# Patient Record
Sex: FEMALE | Race: WHITE | NOT HISPANIC OR LATINO
[De-identification: names, ages, dates, MRNs, and addresses within clinical notes are randomized per-mention and may not be internally consistent; named-entity substitution may affect disease eponyms.]

---

## 2018-04-25 ENCOUNTER — TRANSCRIPTION ENCOUNTER (OUTPATIENT)
Age: 33
End: 2018-04-25

## 2018-04-26 ENCOUNTER — APPOINTMENT (OUTPATIENT)
Dept: HEMATOLOGY ONCOLOGY | Facility: CLINIC | Age: 33
End: 2018-04-26
Payer: COMMERCIAL

## 2018-04-26 VITALS
TEMPERATURE: 99.3 F | RESPIRATION RATE: 16 BRPM | DIASTOLIC BLOOD PRESSURE: 58 MMHG | HEART RATE: 80 BPM | SYSTOLIC BLOOD PRESSURE: 108 MMHG | OXYGEN SATURATION: 99 % | HEIGHT: 66.73 IN | BODY MASS INDEX: 19.69 KG/M2 | WEIGHT: 124 LBS

## 2018-04-26 DIAGNOSIS — Z83.2 FAMILY HISTORY OF DISEASES OF THE BLOOD AND BLOOD-FORMING ORGANS AND CERTAIN DISORDERS INVOLVING THE IMMUNE MECHANISM: ICD-10-CM

## 2018-04-26 PROCEDURE — 99205 OFFICE O/P NEW HI 60 MIN: CPT

## 2018-06-13 ENCOUNTER — TRANSCRIPTION ENCOUNTER (OUTPATIENT)
Age: 33
End: 2018-06-13

## 2018-06-22 ENCOUNTER — APPOINTMENT (OUTPATIENT)
Dept: HEMATOLOGY ONCOLOGY | Facility: CLINIC | Age: 33
End: 2018-06-22
Payer: COMMERCIAL

## 2018-06-22 VITALS
WEIGHT: 125 LBS | HEART RATE: 83 BPM | DIASTOLIC BLOOD PRESSURE: 72 MMHG | RESPIRATION RATE: 20 BRPM | OXYGEN SATURATION: 100 % | SYSTOLIC BLOOD PRESSURE: 111 MMHG | BODY MASS INDEX: 19.85 KG/M2 | HEIGHT: 66.73 IN | TEMPERATURE: 98.3 F

## 2018-06-22 PROCEDURE — 99214 OFFICE O/P EST MOD 30 MIN: CPT

## 2018-06-30 ENCOUNTER — TRANSCRIPTION ENCOUNTER (OUTPATIENT)
Age: 33
End: 2018-06-30

## 2018-07-16 ENCOUNTER — APPOINTMENT (OUTPATIENT)
Dept: HEMATOLOGY ONCOLOGY | Facility: CLINIC | Age: 33
End: 2018-07-16
Payer: COMMERCIAL

## 2018-07-16 VITALS
DIASTOLIC BLOOD PRESSURE: 59 MMHG | OXYGEN SATURATION: 100 % | BODY MASS INDEX: 19.69 KG/M2 | HEART RATE: 82 BPM | RESPIRATION RATE: 16 BRPM | HEIGHT: 66.73 IN | WEIGHT: 124 LBS | SYSTOLIC BLOOD PRESSURE: 103 MMHG | TEMPERATURE: 99.4 F

## 2018-07-16 PROCEDURE — 99214 OFFICE O/P EST MOD 30 MIN: CPT

## 2018-07-30 ENCOUNTER — TRANSCRIPTION ENCOUNTER (OUTPATIENT)
Age: 33
End: 2018-07-30

## 2018-08-13 ENCOUNTER — APPOINTMENT (OUTPATIENT)
Dept: HEMATOLOGY ONCOLOGY | Facility: CLINIC | Age: 33
End: 2018-08-13
Payer: COMMERCIAL

## 2018-08-13 VITALS
BODY MASS INDEX: 19.85 KG/M2 | OXYGEN SATURATION: 97 % | HEIGHT: 66.73 IN | HEART RATE: 101 BPM | DIASTOLIC BLOOD PRESSURE: 66 MMHG | RESPIRATION RATE: 20 BRPM | WEIGHT: 125 LBS | TEMPERATURE: 99 F | SYSTOLIC BLOOD PRESSURE: 101 MMHG

## 2018-08-13 PROCEDURE — 99214 OFFICE O/P EST MOD 30 MIN: CPT

## 2018-09-07 ENCOUNTER — APPOINTMENT (OUTPATIENT)
Dept: HEMATOLOGY ONCOLOGY | Facility: CLINIC | Age: 33
End: 2018-09-07
Payer: COMMERCIAL

## 2018-09-07 VITALS
TEMPERATURE: 98.2 F | BODY MASS INDEX: 20.01 KG/M2 | RESPIRATION RATE: 20 BRPM | HEART RATE: 72 BPM | SYSTOLIC BLOOD PRESSURE: 114 MMHG | OXYGEN SATURATION: 100 % | WEIGHT: 125.99 LBS | HEIGHT: 66.73 IN | DIASTOLIC BLOOD PRESSURE: 76 MMHG

## 2018-09-07 PROCEDURE — 99214 OFFICE O/P EST MOD 30 MIN: CPT

## 2018-10-18 ENCOUNTER — TRANSCRIPTION ENCOUNTER (OUTPATIENT)
Age: 33
End: 2018-10-18

## 2018-11-02 ENCOUNTER — TRANSCRIPTION ENCOUNTER (OUTPATIENT)
Age: 33
End: 2018-11-02

## 2018-11-02 NOTE — ASSESSMENT
[FreeTextEntry1] : IBD/ Premenopausal state without heavy bleeding\par Fatigue, tiredness, exhaustion\par \par Suspect her fatigue is multifactorial \par Component of iron deficiency - better with IV iron\par However fatigue continues to affect her routine- ? related to inflammation related to IBD vs CTD vs fibromyalgia\par AIDEN speckled with 1:80\par Has appointment with Dr Rivera (rheumatology)\par \par Dehydration\par IV NS today and once a month\par \par Follow up in 8 weeks. CBC, CMP, ESR, CRP, ferritin\par \par

## 2018-11-02 NOTE — CONSULT LETTER
[Dear  ___] : Dear  [unfilled], [Consult Letter:] : I had the pleasure of evaluating your patient, [unfilled]. [Please see my note below.] : Please see my note below. [Consult Closing:] : Thank you very much for allowing me to participate in the care of this patient.  If you have any questions, please do not hesitate to contact me. [Sincerely,] : Sincerely, [FreeTextEntry3] : Sridhar Meneses MD, MPH\par Attending Physician\par Hematology Oncology\par [facility] [place]\par [facility] [place]\par  [DrAdams  ___] : Dr. YOUNG

## 2018-11-02 NOTE — PHYSICAL EXAM
[Fully active, able to carry on all pre-disease performance without restriction] : Status 0 - Fully active, able to carry on all pre-disease performance without restriction [Thin] : thin [Normal] : normoactive bowel sounds, soft and nontender, no hepatosplenomegaly or masses appreciated

## 2018-11-02 NOTE — HISTORY OF PRESENT ILLNESS
[de-identified] : Ms Cuevas is a very pleasant 32 year old patient with IBD here with anemia\par \par IBD/UC vs Crohns since 2011- gets flair ups - liaza 4 tab/day. Canaza supp\par Was recently on prednisone - stopped due to rash - 2-3 weeks\par \par LMP 4/5/18 - Are not heavy\par \par Every 2 months had small amount of blood in stool\par \par Feels tired and exhausted\par  [de-identified] : She is seen today for follow up. \par \par Overall remains stable with similar complaints\par Fatigue, occasional diarrhea causing dehydration. Feels better with hydration

## 2018-11-09 ENCOUNTER — APPOINTMENT (OUTPATIENT)
Dept: HEMATOLOGY ONCOLOGY | Facility: CLINIC | Age: 33
End: 2018-11-09

## 2018-11-11 ENCOUNTER — LABORATORY RESULT (OUTPATIENT)
Age: 33
End: 2018-11-11

## 2018-11-22 ENCOUNTER — TRANSCRIPTION ENCOUNTER (OUTPATIENT)
Age: 33
End: 2018-11-22

## 2018-11-26 ENCOUNTER — CHART COPY (OUTPATIENT)
Age: 33
End: 2018-11-26

## 2018-11-28 ENCOUNTER — RECORD ABSTRACTING (OUTPATIENT)
Age: 33
End: 2018-11-28

## 2018-11-28 DIAGNOSIS — G25.81 RESTLESS LEGS SYNDROME: ICD-10-CM

## 2018-11-28 DIAGNOSIS — F06.1 MAJOR DEPRESSIVE DISORDER, RECURRENT, MODERATE: ICD-10-CM

## 2018-11-28 DIAGNOSIS — R15.2 FECAL URGENCY: ICD-10-CM

## 2018-11-28 DIAGNOSIS — Z80.0 FAMILY HISTORY OF MALIGNANT NEOPLASM OF DIGESTIVE ORGANS: ICD-10-CM

## 2018-11-28 DIAGNOSIS — F33.1 MAJOR DEPRESSIVE DISORDER, RECURRENT, MODERATE: ICD-10-CM

## 2018-12-05 ENCOUNTER — APPOINTMENT (OUTPATIENT)
Dept: GASTROENTEROLOGY | Facility: CLINIC | Age: 33
End: 2018-12-05
Payer: OTHER MISCELLANEOUS

## 2018-12-05 VITALS
DIASTOLIC BLOOD PRESSURE: 72 MMHG | HEIGHT: 66 IN | SYSTOLIC BLOOD PRESSURE: 130 MMHG | BODY MASS INDEX: 20.09 KG/M2 | WEIGHT: 125 LBS | HEART RATE: 97 BPM

## 2018-12-05 VITALS — TEMPERATURE: 99.1 F

## 2018-12-05 PROCEDURE — 99215 OFFICE O/P EST HI 40 MIN: CPT

## 2018-12-05 RX ORDER — BUDESONIDE 9 MG/1
9 TABLET, EXTENDED RELEASE ORAL
Qty: 30 | Refills: 0 | Status: DISCONTINUED | COMMUNITY
Start: 2018-02-13 | End: 2018-12-05

## 2018-12-05 RX ORDER — MELOXICAM 7.5 MG/1
7.5 TABLET ORAL
Qty: 30 | Refills: 0 | Status: DISCONTINUED | COMMUNITY
Start: 2018-02-15 | End: 2018-12-05

## 2018-12-05 RX ORDER — BALSALAZIDE DISODIUM 750 MG/1
750 CAPSULE ORAL
Qty: 270 | Refills: 0 | Status: DISCONTINUED | COMMUNITY
Start: 2017-08-10 | End: 2018-12-05

## 2018-12-05 RX ORDER — HYOSCYAMINE SULFATE 0.12 MG/1
0.12 TABLET SUBLINGUAL
Qty: 30 | Refills: 0 | Status: DISCONTINUED | COMMUNITY
Start: 2018-04-12 | End: 2018-12-05

## 2018-12-05 RX ORDER — BALSALAZIDE DISODIUM 750 MG/1
750 CAPSULE ORAL 3 TIMES DAILY
Refills: 0 | Status: DISCONTINUED | COMMUNITY
End: 2018-12-05

## 2018-12-05 RX ORDER — HYDROCORTISONE 100 MG/60ML
100 ENEMA RECTAL
Refills: 0 | Status: DISCONTINUED | COMMUNITY
End: 2018-12-05

## 2018-12-05 RX ORDER — AMOXICILLIN AND CLAVULANATE POTASSIUM 875; 125 MG/1; 1/1
875-125 TABLET, FILM COATED ORAL
Refills: 0 | Status: DISCONTINUED | COMMUNITY
End: 2018-12-05

## 2018-12-05 RX ORDER — PANTOPRAZOLE 40 MG/1
40 TABLET, DELAYED RELEASE ORAL
Qty: 30 | Refills: 0 | Status: DISCONTINUED | COMMUNITY
Start: 2018-02-25 | End: 2018-12-05

## 2018-12-05 RX ORDER — TRIAMCINOLONE ACETONIDE 1 MG/G
0.1 PASTE DENTAL
Qty: 5 | Refills: 0 | Status: DISCONTINUED | COMMUNITY
Start: 2018-01-13 | End: 2018-12-05

## 2018-12-05 RX ORDER — LIDOCAINE HCL 2 %
2 SOLUTION, ORAL MUCOUS MEMBRANE
Refills: 0 | Status: DISCONTINUED | COMMUNITY
End: 2018-12-05

## 2018-12-05 RX ORDER — CLINDAMYCIN AND BENZOYL PEROXIDE 50; 10 MG/G; MG/G
1-5 GEL TOPICAL
Qty: 50 | Refills: 0 | Status: DISCONTINUED | COMMUNITY
Start: 2018-04-10 | End: 2018-12-05

## 2018-12-05 RX ORDER — PANTOPRAZOLE SODIUM 40 MG/1
40 TABLET, DELAYED RELEASE ORAL DAILY
Refills: 0 | Status: DISCONTINUED | COMMUNITY
End: 2018-12-05

## 2018-12-05 RX ORDER — HYDROCORTISONE 100 MG/60ML
100 ENEMA RECTAL
Qty: 1680 | Refills: 0 | Status: DISCONTINUED | COMMUNITY
Start: 2018-02-13 | End: 2018-12-05

## 2018-12-05 RX ORDER — MESALAMINE 1000 MG/1
1000 SUPPOSITORY RECTAL
Qty: 30 | Refills: 0 | Status: DISCONTINUED | COMMUNITY
Start: 2018-02-13 | End: 2018-12-05

## 2018-12-05 RX ORDER — CYCLOBENZAPRINE HYDROCHLORIDE 10 MG/1
10 TABLET, FILM COATED ORAL
Qty: 90 | Refills: 0 | Status: DISCONTINUED | COMMUNITY
Start: 2018-02-15 | End: 2018-12-05

## 2018-12-05 RX ORDER — TOBRAMYCIN 3 MG/ML
0.3 SOLUTION/ DROPS OPHTHALMIC
Qty: 5 | Refills: 0 | Status: DISCONTINUED | COMMUNITY
Start: 2018-06-13 | End: 2018-12-05

## 2018-12-05 RX ORDER — RIFAXIMIN 550 MG/1
550 TABLET ORAL
Qty: 42 | Refills: 0 | Status: DISCONTINUED | COMMUNITY
Start: 2018-03-13 | End: 2018-12-05

## 2018-12-05 RX ORDER — ALBUTEROL SULFATE 90 UG/1
108 AEROSOL, METERED RESPIRATORY (INHALATION)
Refills: 0 | Status: DISCONTINUED | COMMUNITY
End: 2018-12-05

## 2018-12-05 RX ORDER — FLUCONAZOLE 150 MG/1
150 TABLET ORAL DAILY
Refills: 0 | Status: DISCONTINUED | COMMUNITY
End: 2018-12-05

## 2018-12-05 RX ORDER — LIDOCAINE HYDROCHLORIDE 20 MG/ML
2 SOLUTION OROPHARYNGEAL
Qty: 450 | Refills: 0 | Status: DISCONTINUED | COMMUNITY
Start: 2017-12-30 | End: 2018-12-05

## 2018-12-05 RX ORDER — PREDNISONE 10 MG/1
10 TABLET ORAL
Qty: 120 | Refills: 0 | Status: DISCONTINUED | COMMUNITY
Start: 2018-03-05 | End: 2018-12-05

## 2018-12-05 RX ORDER — PREGABALIN 25 MG/1
25 CAPSULE ORAL
Qty: 60 | Refills: 0 | Status: DISCONTINUED | COMMUNITY
Start: 2017-11-30 | End: 2018-12-05

## 2018-12-06 ENCOUNTER — INBOUND DOCUMENT (OUTPATIENT)
Age: 33
End: 2018-12-06

## 2018-12-07 ENCOUNTER — APPOINTMENT (OUTPATIENT)
Dept: FAMILY MEDICINE | Facility: CLINIC | Age: 33
End: 2018-12-07
Payer: OTHER MISCELLANEOUS

## 2018-12-07 VITALS
HEART RATE: 96 BPM | OXYGEN SATURATION: 98 % | BODY MASS INDEX: 20.41 KG/M2 | TEMPERATURE: 99.4 F | SYSTOLIC BLOOD PRESSURE: 118 MMHG | DIASTOLIC BLOOD PRESSURE: 70 MMHG | RESPIRATION RATE: 18 BRPM | HEIGHT: 66 IN | WEIGHT: 127 LBS

## 2018-12-07 DIAGNOSIS — Z87.2 PERSONAL HISTORY OF DISEASES OF THE SKIN AND SUBCUTANEOUS TISSUE: ICD-10-CM

## 2018-12-07 DIAGNOSIS — Z86.19 PERSONAL HISTORY OF OTHER INFECTIOUS AND PARASITIC DISEASES: ICD-10-CM

## 2018-12-07 DIAGNOSIS — G43.909 MIGRAINE, UNSPECIFIED, NOT INTRACTABLE, W/OUT STATUS MIGRAINOSUS: ICD-10-CM

## 2018-12-07 LAB
C DIFF TOX GENS STL QL NAA+PROBE: NORMAL
CDIFF BY PCR: NOT DETECTED
CRP SERPL-MCNC: 0.28 MG/DL
ERYTHROCYTE [SEDIMENTATION RATE] IN BLOOD BY WESTERGREN METHOD: 15 MM/HR
M TB IFN-G BLD-IMP: NEGATIVE
QUANTIFERON TB PLUS MITOGEN MINUS NIL: 6.7 IU/ML
QUANTIFERON TB PLUS NIL: 0.02 IU/ML
QUANTIFERON TB PLUS TB1 MINUS NIL: 0 IU/ML
QUANTIFERON TB PLUS TB2 MINUS NIL: 0 IU/ML

## 2018-12-07 PROCEDURE — 99214 OFFICE O/P EST MOD 30 MIN: CPT

## 2018-12-08 PROBLEM — G43.909 MIGRAINES: Status: RESOLVED | Noted: 2018-12-05 | Resolved: 2018-12-08

## 2018-12-08 PROBLEM — Z87.2 H/O ACNE VULGARIS: Status: RESOLVED | Noted: 2018-11-28 | Resolved: 2018-12-08

## 2018-12-08 PROBLEM — Z86.19 HISTORY OF PSEUDOMEMBRANOUS ENTEROCOLITIS: Status: RESOLVED | Noted: 2018-11-28 | Resolved: 2018-12-08

## 2018-12-08 NOTE — REVIEW OF SYSTEMS
[Negative] : Heme/Lymph [Abdominal Pain] : abdominal pain [Diarrhea] : diarrhea [Fever] : no fever [Chills] : no chills [Fatigue] : no fatigue [Nausea] : no nausea [Vomiting] : no vomiting

## 2018-12-08 NOTE — HISTORY OF PRESENT ILLNESS
[FreeTextEntry1] : Workers' Compensation case followup. [de-identified] : Patient presents for followup of chronic diarrhea/abdominal colic, associated with documented pseudomembranous colitis from 10/31/18. Since last office visit, patient was hospitalized-[facility]-11/21-11/25/18-for dehydration. Treated with IV fluids and IV metronidazole. Upon discharge, was continued on oral vancomycin 250 mg qid to be tapered down to 125 mg/day over 4 weeks. During hospitalization, underwent emergent colonoscopy which revealed no evidence of pseudomembranous colitis, but with evidence of residual inflammatory bowel disease, most consistent with Crohn's disease at this time. Patient has been provided with an off work order through 01/05/19. 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 There have been now 2 negative clostridium difficile antigen tests on the patient's stool-one during hospitalization and one after hospitalization. Had had one negative test prior to the hospitalization. Patient states that she is feeling much better overall and tolerating all oral medications well. Having 2-3 semi-formed stools/day without blood, pus, or mucus.

## 2018-12-08 NOTE — PHYSICAL EXAM

## 2018-12-13 ENCOUNTER — APPOINTMENT (OUTPATIENT)
Dept: FAMILY MEDICINE | Facility: CLINIC | Age: 33
End: 2018-12-13

## 2018-12-14 ENCOUNTER — RX RENEWAL (OUTPATIENT)
Age: 33
End: 2018-12-14

## 2018-12-28 ENCOUNTER — APPOINTMENT (OUTPATIENT)
Dept: FAMILY MEDICINE | Facility: CLINIC | Age: 33
End: 2018-12-28
Payer: OTHER MISCELLANEOUS

## 2018-12-28 VITALS
HEIGHT: 66 IN | HEART RATE: 99 BPM | BODY MASS INDEX: 20.41 KG/M2 | TEMPERATURE: 98.7 F | DIASTOLIC BLOOD PRESSURE: 80 MMHG | SYSTOLIC BLOOD PRESSURE: 118 MMHG | OXYGEN SATURATION: 99 % | WEIGHT: 127 LBS | RESPIRATION RATE: 18 BRPM

## 2018-12-28 PROCEDURE — 99214 OFFICE O/P EST MOD 30 MIN: CPT

## 2018-12-30 ENCOUNTER — APPOINTMENT (OUTPATIENT)
Dept: FAMILY MEDICINE | Facility: CLINIC | Age: 33
End: 2018-12-30
Payer: COMMERCIAL

## 2018-12-30 VITALS
HEART RATE: 97 BPM | OXYGEN SATURATION: 99 % | BODY MASS INDEX: 20.41 KG/M2 | HEIGHT: 66 IN | RESPIRATION RATE: 18 BRPM | WEIGHT: 127 LBS

## 2018-12-30 LAB
C DIFF TOX GENS STL QL NAA+PROBE: NORMAL
CDIFF BY PCR: NOT DETECTED

## 2018-12-30 PROCEDURE — 99214 OFFICE O/P EST MOD 30 MIN: CPT

## 2018-12-30 RX ORDER — VANCOMYCIN HYDROCHLORIDE 125 MG/1
125 CAPSULE ORAL
Qty: 49 | Refills: 0 | Status: DISCONTINUED | COMMUNITY
Start: 2018-11-25

## 2018-12-30 RX ORDER — AMOXICILLIN 875 MG/1
875 TABLET, FILM COATED ORAL
Qty: 20 | Refills: 0 | Status: DISCONTINUED | COMMUNITY
Start: 2018-09-24

## 2018-12-30 RX ORDER — MECLIZINE HYDROCHLORIDE 25 MG/1
25 TABLET ORAL
Qty: 20 | Refills: 0 | Status: DISCONTINUED | COMMUNITY
Start: 2018-09-24

## 2018-12-30 RX ORDER — ESCITALOPRAM OXALATE 10 MG/1
10 TABLET ORAL
Refills: 0 | Status: DISCONTINUED | COMMUNITY
Start: 2018-01-15 | End: 2018-12-30

## 2018-12-30 NOTE — HISTORY OF PRESENT ILLNESS
[FreeTextEntry1] : Workers' Compensation case followup. [de-identified] : Patient presents for followup of chronic diarrhea/abdominal colic, associated with documented pseudomembranous colitis from 10/31/18. Since last office visit, patient was hospitalized-[facility]-11/21-11/25/18-for dehydration. Treated with IV fluids and IV metronidazole. Upon discharge, was continued on oral vancomycin 250 mg qid to be tapered down to 125 mg/day over 4 weeks. During hospitalization, underwent emergent colonoscopy which revealed no evidence of pseudomembranous colitis, but with evidence of residual inflammatory bowel disease, most consistent with Crohn's disease at this time. Patient had been provided with an off work order through 01/05/19. 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 There had been 2 negative clostridium difficile antigen tests on the patient's stool-one during hospitalization and one after hospitalization. Had had one negative test prior to the hospitalization. Patient now states a recurrence of loose foul-smelling stools over the past 4-5 days. Has brought in a sample to be retested for clostridium difficile. Denies any blood, pus, or mucus in stools. No fever or chills. Denies significant abdominal distress.

## 2018-12-30 NOTE — HISTORY OF PRESENT ILLNESS
[FreeTextEntry1] : 4 week followup of chronic medical conditions, and prescription refills. [de-identified] : Patient presents for followup of chronic medical conditions-nature depressive disorder, generalized anxiety disorder, migraine headaches, acne vulgaris-and prescription refills. Presently taking Lexapro 20 mg/day and tolerating. Symptoms are now well controlled. Using clonazepam 0.5 mg HS for sleep only. Migraine headaches are stable. Continues to have diarrhea despite negative clostridium difficile stool PCR.

## 2018-12-30 NOTE — PHYSICAL EXAM

## 2018-12-30 NOTE — REVIEW OF SYSTEMS
[Negative] : Heme/Lymph [Fatigue] : fatigue [Abdominal Pain] : abdominal pain [Diarrhea] : diarrhea [Nausea] : no nausea [Constipation] : no constipation [Vomiting] : no vomiting [Heartburn] : no heartburn [Melena] : no melena

## 2019-01-01 ENCOUNTER — TRANSCRIPTION ENCOUNTER (OUTPATIENT)
Age: 34
End: 2019-01-01

## 2019-01-01 LAB
C DIFF TOX GENS STL QL NAA+PROBE: NORMAL
CDIFF BY PCR: DETECTED

## 2019-01-07 ENCOUNTER — APPOINTMENT (OUTPATIENT)
Dept: GASTROENTEROLOGY | Facility: CLINIC | Age: 34
End: 2019-01-07
Payer: COMMERCIAL

## 2019-01-07 ENCOUNTER — RX RENEWAL (OUTPATIENT)
Age: 34
End: 2019-01-07

## 2019-01-07 VITALS
TEMPERATURE: 99.2 F | RESPIRATION RATE: 16 BRPM | OXYGEN SATURATION: 99 % | SYSTOLIC BLOOD PRESSURE: 110 MMHG | BODY MASS INDEX: 19.61 KG/M2 | WEIGHT: 122 LBS | DIASTOLIC BLOOD PRESSURE: 70 MMHG | HEIGHT: 66 IN | HEART RATE: 91 BPM

## 2019-01-07 PROCEDURE — 99245 OFF/OP CONSLTJ NEW/EST HI 55: CPT

## 2019-01-07 RX ORDER — PROMETHAZINE HYDROCHLORIDE AND CODEINE PHOSPHATE 6.25; 1 MG/5ML; MG/5ML
6.25-1 SOLUTION ORAL
Refills: 0 | Status: DISCONTINUED | COMMUNITY
End: 2019-01-07

## 2019-01-07 NOTE — ASSESSMENT
[FreeTextEntry1] : 33 Y F, originally diagnosed with UC 2011, question of Crohn's Disease now that granulomas seen on recent colon biopsy. No e/o small bowel disease. Has been treated for  C diff 5 times with vancomycin without success as PCR remains positive and she remains symptomatic despite no endoscopic disease activity.  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 \par - In addition, there is data to suggest that C diff in IBD patients would benefit from IBD therapy escalation (ie. poorly controlled colitis is a RF for C diff; Minna et al, 2018 ), but her Nov 2018 colonoscopy was normal appearing!\par \par Indeterminate IBD, UC presentation, but recently with granuloma's (+) on biopsy\par - continue mesalamine for now given normal looking colon recently.\par - Ddx includes Crohn's and Behcets (she has large tongue/cheek ulcers, biopsied)\par - Once recovered from C diff, we can request the slides for our own review (colon + lip); consider serology (asca can be + in [place] as well); need to ask her about genital apthae/eye/skin lesions; consider rheum referral\par - it would be ideal for her to get ahold of her very first colonoscopy report/images\par \par Incontinence\par - schedule ARM 1 month after FMT \par \par F/U post-FMT

## 2019-01-07 NOTE — PHYSICAL EXAM
[General Appearance - Alert] : alert [General Appearance - In No Acute Distress] : in no acute distress [Neck Appearance] : the appearance of the neck was normal [] : no respiratory distress [Bowel Sounds] : normal bowel sounds [Abdomen Soft] : soft [Abdomen Tenderness] : non-tender [Abnormal Walk] : normal gait [Skin Color & Pigmentation] : normal skin color and pigmentation [No Focal Deficits] : no focal deficits [Oriented To Time, Place, And Person] : oriented to person, place, and time [Sclera] : the sclera and conjunctiva were normal [Outer Ear] : the ears and nose were normal in appearance [Cervical Lymph Nodes Enlarged Posterior Bilaterally] : posterior cervical [No CVA Tenderness] : no ~M costovertebral angle tenderness

## 2019-01-07 NOTE — CONSULT LETTER
[Dear  ___] : Dear  [unfilled], [Consult Letter:] : I had the pleasure of evaluating your patient, [unfilled]. [Please see my note below.] : Please see my note below. [Consult Closing:] : Thank you very much for allowing me to participate in the care of this patient.  If you have any questions, please do not hesitate to contact me. [Sincerely,] : Sincerely, [FreeTextEntry3] : Gagan Bansal MD\par Director, IBD Program\par [facility], [facility]\par \par Associate Professor of Medicine\par [employer]\par [employer] [facility]\par  [DrAdams  ___] : Dr. YOUNG

## 2019-01-07 NOTE — ASSESSMENT
[FreeTextEntry1] : 33 Y F, originally diagnosed with UC 2011, question of Crohn's Disease now that granulomas seen on recent colon biopsy. No e/o small bowel disease. Has been treated for  C diff 5 times with vancomycin without success as PCR remains positive and she remains symptomatic despite no endoscopic disease activity.  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 \par - In addition, there is data to suggest that C diff in IBD patients would benefit from IBD therapy escalation (ie. poorly controlled colitis is a RF for C diff; Minna et al, 2018 ), but her Nov 2018 colonoscopy was normal appearing!\par \par Indeterminate IBD, UC presentation, but recently with granuloma's (+) on biopsy\par - continue mesalamine for now given normal looking colon recently.\par - Ddx includes Crohn's and Behcets (she has large tongue/cheek ulcers, biopsied)\par - Once recovered from C diff, we can request the slides for our own review (colon + lip); consider serology ([facility]a can be + in [place] as well); need to ask her about genital apthae/eye/skin lesions; consider rheum referral\par - it would be ideal for her to get ahold of her very first colonoscopy report/images\par \par Incontinence\par - schedule ARM 1 month after FMT \par \par F/U post-FMT  13

## 2019-01-07 NOTE — CONSULT LETTER
[Dear  ___] : Dear  [unfilled], [Consult Letter:] : I had the pleasure of evaluating your patient, [unfilled]. [Please see my note below.] : Please see my note below. [Consult Closing:] : Thank you very much for allowing me to participate in the care of this patient.  If you have any questions, please do not hesitate to contact me. [Sincerely,] : Sincerely, [FreeTextEntry3] : Gagan Bansal MD\par Director, IBD Program\par [facility], [facility]\par \par Associate Professor of Medicine\par [employer]\par [employer] [facility]\par  [DrAdams  ___] : Dr. YOUNG

## 2019-01-07 NOTE — HISTORY OF PRESENT ILLNESS
[de-identified] : 33 Y F, originally diagnosed with UC in 2011, here referred for fecal transplant for refractory C diff and confirming IBD dx. Diagnoses recently changed to Crohn's Disease due to granuloma seen on biopsy from Nov 2018 cscope. 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 \par \par HPI: In 2011, she was having blood/mucous in stool, w/ diarrhea, incontinence and abdominal pain/cramping. Started on prednisone and solumedrol at diagnosis - thinks she was in remission for a few months. Started on colazol, switched to Lialda. Had another flare about 6 months later, restarted prednisone. Has been off and on prednisone - Dr. Pereira apparently was dismissing her symptoms and told her she had a "virus" and in 2018 she went to see Dr. Huitron in November 2018 when she was admitted to hospital for dehydration. Has been on prednisone since diagnosis about 4-5 times. \par \par Biopsies at diagnosis were c/w IBD - from rectum to cecum but no endoscopy report available. \par \par First Cdiff exposure was June 2017 - treated with vancomycin. \par November 2018 2nd diagnosis - treated twice - with vancomycin + flagyl, and vanco taper again \par December 2018 3rd diagnosis -  on vancomycin since (250mg Q6H) \par \par Continues to have fecal incontinence - feels the urge but unable to make it to the bathroom. Usually diarrhea associated incontinence, not formed stool. Reports nocturnal symptoms on occasion, few times per week. AM frequency ~ 5x/day, very loose. Sees blood on toilet paper, not in toilet. Abdominal pain daily LLQ before BMs. BM relieves pain. + nausea/vomiting - takes Zofran PRN every few days. + weight loss ~ 5lbs in 1 month, no appetite. \par \par Patient denies any trauma to the rectum. No anal fistula or fissure. \par \par EIMs: at diagnosis, had PG on legs, no eye complaints, + apthous ulcers that doesn't track disease, no joint pains, but muscle cramps at night \par \par Past treatment included: Lialda, prednisone, colazol, Canasa, Rowasa, Rifaximin, Uceris, and treated with injectafer for HECTOR and NS IVfluids every month - not for the last few months however. \par \par Fam hx: no IBD, maternal grandmother with CRC age 74\par Social hx: no tobacco use, no illicit drug use, NSAID use 5x/month, no alcohol use. \par \par Using tylenol + codeine for LLQ pain started in November. \par \par Been out of work since October 31.

## 2019-01-07 NOTE — HISTORY OF PRESENT ILLNESS
[de-identified] : 33 Y F, originally diagnosed with UC in 2011, here referred for fecal transplant for refractory C diff and confirming IBD dx. Diagnoses recently changed to Crohn's Disease due to granuloma seen on biopsy from Nov 2018 cscope. 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 \par \par HPI: In 2011, she was having blood/mucous in stool, w/ diarrhea, incontinence and abdominal pain/cramping. Started on prednisone and solumedrol at diagnosis - thinks she was in remission for a few months. Started on colazol, switched to Lialda. Had another flare about 6 months later, restarted prednisone. Has been off and on prednisone - Dr. Pereira apparently was dismissing her symptoms and told her she had a "virus" and in 2018 she went to see Dr. Huitron in November 2018 when she was admitted to hospital for dehydration. Has been on prednisone since diagnosis about 4-5 times. \par \par Biopsies at diagnosis were c/w IBD - from rectum to cecum but no endoscopy report available. \par \par First Cdiff exposure was June 2017 - treated with vancomycin. \par November 2018 2nd diagnosis - treated twice - with vancomycin + flagyl, and vanco taper again \par December 2018 3rd diagnosis -  on vancomycin since (250mg Q6H) \par \par Continues to have fecal incontinence - feels the urge but unable to make it to the bathroom. Usually diarrhea associated incontinence, not formed stool. Reports nocturnal symptoms on occasion, few times per week. AM frequency ~ 5x/day, very loose. Sees blood on toilet paper, not in toilet. Abdominal pain daily LLQ before BMs. BM relieves pain. + nausea/vomiting - takes Zofran PRN every few days. + weight loss ~ 5lbs in 1 month, no appetite. \par \par Patient denies any trauma to the rectum. No anal fistula or fissure. \par \par EIMs: at diagnosis, had PG on legs, no eye complaints, + apthous ulcers that doesn't track disease, no joint pains, but muscle cramps at night \par \par Past treatment included: Lialda, prednisone, colazol, Canasa, Rowasa, Rifaximin, Uceris, and treated with injectafer for HECTOR and NS IVfluids every month - not for the last few months however. \par \par Fam hx: no IBD, maternal grandmother with CRC age 74\par Social hx: no tobacco use, no illicit drug use, NSAID use 5x/month, no alcohol use. \par \par Using tylenol + codeine for LLQ pain started in November. \par \par Been out of work since October 31.

## 2019-01-10 ENCOUNTER — APPOINTMENT (OUTPATIENT)
Dept: RHEUMATOLOGY | Facility: CLINIC | Age: 34
End: 2019-01-10

## 2019-01-13 ENCOUNTER — APPOINTMENT (OUTPATIENT)
Dept: FAMILY MEDICINE | Facility: CLINIC | Age: 34
End: 2019-01-13
Payer: COMMERCIAL

## 2019-01-13 VITALS
DIASTOLIC BLOOD PRESSURE: 72 MMHG | BODY MASS INDEX: 20.73 KG/M2 | RESPIRATION RATE: 18 BRPM | HEART RATE: 76 BPM | WEIGHT: 129 LBS | OXYGEN SATURATION: 98 % | HEIGHT: 66 IN | TEMPERATURE: 98 F | SYSTOLIC BLOOD PRESSURE: 116 MMHG

## 2019-01-13 PROCEDURE — 99215 OFFICE O/P EST HI 40 MIN: CPT

## 2019-01-13 NOTE — PHYSICAL EXAM
[No Acute Distress] : no acute distress [Well Nourished] : well nourished [Well Developed] : well developed [Normal Sclera/Conjunctiva] : normal sclera/conjunctiva [PERRL] : pupils equal round and reactive to light [EOMI] : extraocular movements intact [Normal Outer Ear/Nose] : the outer ears and nose were normal in appearance [Normal Oropharynx] : the oropharynx was normal [No JVD] : no jugular venous distention [Supple] : supple [No Lymphadenopathy] : no lymphadenopathy [Thyroid Normal, No Nodules] : the thyroid was normal and there were no nodules present [No Respiratory Distress] : no respiratory distress  [Clear to Auscultation] : lungs were clear to auscultation bilaterally [No Accessory Muscle Use] : no accessory muscle use [Normal Rate] : normal rate  [Regular Rhythm] : with a regular rhythm [Normal S1, S2] : normal S1 and S2 [No Murmur] : no murmur heard [No Carotid Bruits] : no carotid bruits [No Abdominal Bruit] : a ~M bruit was not heard ~T in the abdomen [No Varicosities] : no varicosities [Pedal Pulses Present] : the pedal pulses are present [No Edema] : there was no peripheral edema [No Extremity Clubbing/Cyanosis] : no extremity clubbing/cyanosis [No Palpable Aorta] : no palpable aorta [Soft] : abdomen soft [Non Tender] : non-tender [Non-distended] : non-distended [No Masses] : no abdominal mass palpated [No HSM] : no HSM [Normal Bowel Sounds] : normal bowel sounds [Normal Posterior Cervical Nodes] : no posterior cervical lymphadenopathy [Normal Anterior Cervical Nodes] : no anterior cervical lymphadenopathy [No CVA Tenderness] : no CVA  tenderness [No Spinal Tenderness] : no spinal tenderness [No Joint Swelling] : no joint swelling [Grossly Normal Strength/Tone] : grossly normal strength/tone [No Rash] : no rash [Normal Gait] : normal gait [Coordination Grossly Intact] : coordination grossly intact [No Focal Deficits] : no focal deficits [Deep Tendon Reflexes (DTR)] : deep tendon reflexes were 2+ and symmetric [Normal Affect] : the affect was normal [Normal Insight/Judgement] : insight and judgment were intact [Normal Voice/Communication] : normal voice/communication [Ill-Appearing] : ill-appearing [Alert and Oriented x3] : oriented to person, place, and time [Normal Mood] : the mood was normal [de-identified] : Trace LLQ direct tenderness

## 2019-01-13 NOTE — HISTORY OF PRESENT ILLNESS
[FreeTextEntry1] : Followup of pseudomembranous colitis [de-identified] : Patient presents for followup of chronic diarrhea/abdominal colic, associated with documented pseudomembranous colitis from 10/31/18. Since the last office visit of 12/28/18, patient has continued to have loose foul smelling stools x5-6/day. Denies fever or chills. However, having significant intermittent abdominal colic and nocturnal symptoms. There is positive fecal incontinence both day and night. Patient has noted mucus and pus in the stools, but no blood. However, reports occasional "pink tinging" of stools. Stool test of 12/28/18 was reported negative for clostridium difficile, but the stool test of 12/30/18 was reported as positive for clostridium difficile. The patient had been empirically placed on vancomycin 250 mg q6 hrs on 12/28/18, and continues on this dose. Re-evaluated by infectious disease and referred to GI consultant-MAX Bansal MD-at [facility] for a FMT procedure. Seen by the GI consultant on 01/07/19, with the FMT scheduled for 01/25/19. It is anticipated that the patient will not be a return to work until at least 03/03/19. Appetite remains fair, although patient reports intermittent nausea and vomiting. Further, informed by patient that the Workmans' Compensation Board has closed the case, and it now needs to be covered by disability and regular insurance.

## 2019-01-14 ENCOUNTER — APPOINTMENT (OUTPATIENT)
Dept: HEMATOLOGY ONCOLOGY | Facility: CLINIC | Age: 34
End: 2019-01-14
Payer: COMMERCIAL

## 2019-01-14 ENCOUNTER — RESULT REVIEW (OUTPATIENT)
Age: 34
End: 2019-01-14

## 2019-01-14 VITALS
HEART RATE: 94 BPM | OXYGEN SATURATION: 100 % | DIASTOLIC BLOOD PRESSURE: 70 MMHG | WEIGHT: 126 LBS | RESPIRATION RATE: 18 BRPM | SYSTOLIC BLOOD PRESSURE: 105 MMHG | HEIGHT: 66.73 IN | TEMPERATURE: 98.6 F | BODY MASS INDEX: 20.01 KG/M2

## 2019-01-14 PROCEDURE — 99214 OFFICE O/P EST MOD 30 MIN: CPT

## 2019-01-14 NOTE — CONSULT LETTER
[Dear  ___] : Dear  [unfilled], [Consult Letter:] : I had the pleasure of evaluating your patient, [unfilled]. [Please see my note below.] : Please see my note below. [Consult Closing:] : Thank you very much for allowing me to participate in the care of this patient.  If you have any questions, please do not hesitate to contact me. [Sincerely,] : Sincerely, [DrAdams  ___] : Dr. YOUNG [DrAdams ___] : Dr. YOUNG [FreeTextEntry3] : Sridhar Meneses MD, MPH\par Attending Physician\par Hematology Oncology\par [facility] [place]\par [facility]\par

## 2019-01-16 ENCOUNTER — RECORD ABSTRACTING (OUTPATIENT)
Age: 34
End: 2019-01-16

## 2019-01-18 ENCOUNTER — APPOINTMENT (OUTPATIENT)
Dept: GASTROENTEROLOGY | Facility: CLINIC | Age: 34
End: 2019-01-18
Payer: COMMERCIAL

## 2019-01-18 VITALS
SYSTOLIC BLOOD PRESSURE: 92 MMHG | HEART RATE: 92 BPM | BODY MASS INDEX: 20.09 KG/M2 | DIASTOLIC BLOOD PRESSURE: 66 MMHG | WEIGHT: 125 LBS | HEIGHT: 66 IN

## 2019-01-18 PROCEDURE — 99214 OFFICE O/P EST MOD 30 MIN: CPT

## 2019-01-18 RX ORDER — ESCITALOPRAM OXALATE 20 MG/1
20 TABLET ORAL
Qty: 60 | Refills: 0 | Status: DISCONTINUED | COMMUNITY
Start: 2018-12-14 | End: 2019-01-18

## 2019-01-18 RX ORDER — ESCITALOPRAM OXALATE 20 MG/1
20 TABLET, FILM COATED ORAL DAILY
Refills: 0 | Status: DISCONTINUED | COMMUNITY
End: 2019-01-18

## 2019-01-18 RX ORDER — SUMATRIPTAN 100 MG/1
100 TABLET, FILM COATED ORAL
Qty: 9 | Refills: 0 | Status: DISCONTINUED | COMMUNITY
Start: 2018-04-09 | End: 2019-01-18

## 2019-01-25 ENCOUNTER — RESULT REVIEW (OUTPATIENT)
Age: 34
End: 2019-01-25

## 2019-01-25 ENCOUNTER — OUTPATIENT (OUTPATIENT)
Dept: OUTPATIENT SERVICES | Facility: HOSPITAL | Age: 34
LOS: 1 days | Discharge: HOME | End: 2019-01-25
Payer: COMMERCIAL

## 2019-01-25 ENCOUNTER — APPOINTMENT (OUTPATIENT)
Dept: GASTROENTEROLOGY | Facility: HOSPITAL | Age: 34
End: 2019-01-25

## 2019-01-25 LAB
C DIFF GDH STL QL: NEGATIVE — SIGNIFICANT CHANGE UP
C DIFF GDH STL QL: SIGNIFICANT CHANGE UP

## 2019-01-25 PROCEDURE — 87324 CLOSTRIDIUM AG IA: CPT

## 2019-01-25 PROCEDURE — 88305 TISSUE EXAM BY PATHOLOGIST: CPT

## 2019-01-25 PROCEDURE — 45380 COLONOSCOPY AND BIOPSY: CPT | Mod: GC

## 2019-01-25 PROCEDURE — 45380 COLONOSCOPY AND BIOPSY: CPT

## 2019-01-25 PROCEDURE — 87449 NOS EACH ORGANISM AG IA: CPT

## 2019-01-25 RX ORDER — LOPERAMIDE HCL 2 MG
4 TABLET ORAL ONCE
Qty: 0 | Refills: 0 | Status: DISCONTINUED | OUTPATIENT
Start: 2019-01-25 | End: 2019-01-25

## 2019-01-28 LAB — SURGICAL PATHOLOGY STUDY: SIGNIFICANT CHANGE UP

## 2019-02-03 ENCOUNTER — APPOINTMENT (OUTPATIENT)
Dept: FAMILY MEDICINE | Facility: CLINIC | Age: 34
End: 2019-02-03

## 2019-02-04 ENCOUNTER — APPOINTMENT (OUTPATIENT)
Dept: GASTROENTEROLOGY | Facility: CLINIC | Age: 34
End: 2019-02-04
Payer: COMMERCIAL

## 2019-02-04 ENCOUNTER — LABORATORY RESULT (OUTPATIENT)
Age: 34
End: 2019-02-04

## 2019-02-04 VITALS
WEIGHT: 121 LBS | OXYGEN SATURATION: 99 % | HEIGHT: 66 IN | HEART RATE: 89 BPM | RESPIRATION RATE: 14 BRPM | SYSTOLIC BLOOD PRESSURE: 100 MMHG | DIASTOLIC BLOOD PRESSURE: 70 MMHG | BODY MASS INDEX: 19.44 KG/M2

## 2019-02-04 PROCEDURE — 99214 OFFICE O/P EST MOD 30 MIN: CPT | Mod: 25

## 2019-02-04 PROCEDURE — 36415 COLL VENOUS BLD VENIPUNCTURE: CPT

## 2019-02-04 RX ORDER — ACETAMINOPHEN 325 MG/1
TABLET, FILM COATED ORAL
Refills: 0 | Status: DISCONTINUED | COMMUNITY
End: 2019-02-04

## 2019-02-04 RX ORDER — VANCOMYCIN HYDROCHLORIDE 250 MG/1
250 CAPSULE ORAL 4 TIMES DAILY
Qty: 64 | Refills: 0 | Status: DISCONTINUED | COMMUNITY
Start: 2018-12-28 | End: 2019-02-04

## 2019-02-04 RX ORDER — VANCOMYCIN HYDROCHLORIDE 250 MG/1
250 CAPSULE ORAL
Refills: 0 | Status: DISCONTINUED | COMMUNITY
End: 2019-02-04

## 2019-02-04 NOTE — PHYSICAL EXAM
[General Appearance - Alert] : alert [General Appearance - In No Acute Distress] : in no acute distress [Sclera] : the sclera and conjunctiva were normal [Outer Ear] : the ears and nose were normal in appearance [Neck Appearance] : the appearance of the neck was normal [] : no respiratory distress [Apical Impulse] : the apical impulse was normal [Bowel Sounds] : normal bowel sounds [Abdomen Soft] : soft [Abdomen Tenderness] : non-tender [Cervical Lymph Nodes Enlarged Posterior Bilaterally] : posterior cervical [No CVA Tenderness] : no ~M costovertebral angle tenderness [Abnormal Walk] : normal gait [Skin Color & Pigmentation] : normal skin color and pigmentation [No Focal Deficits] : no focal deficits [Oriented To Time, Place, And Person] : oriented to person, place, and time [FreeTextEntry1] : deferred

## 2019-02-04 NOTE — HISTORY OF PRESENT ILLNESS
[de-identified] : 33 Y F, originally diagnosed with UC in 2011, for f/u s/p fecal transplant for refractory C diff and confirming IBD dx. Diagnoses recently changed to Crohn's Disease due to granuloma seen on biopsy from Nov 2018 cscope. 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 \par \par Pt reports since cscope + FMT, she is 50-75% improved. Now having 1-2 formed BMs per day, compared to 4-5 with loose, incontinence. Minimal urgency and abdominal cramping - twice since procedure. Pain resolves with BM. Taking Imodium 3x/week. Also taking tylenol with codeine for pain at night (anticipatory pain). \par \par HPI: In 2011, she was having blood/mucous in stool, w/ diarrhea, incontinence and abdominal pain/cramping. Started on prednisone and solumedrol at diagnosis - thinks she was in remission for a few months. Started on colazol, switched to Lialda. Had another flare about 6 months later, restarted prednisone. Has been off and on prednisone - Dr. Pereira apparently was dismissing her symptoms and told her she had a "virus" and in 2018 she went to see Dr. Huitron in November 2018 when she was admitted to hospital for dehydration. Has been on prednisone since diagnosis about 4-5 times. \par \par Biopsies at diagnosis were c/w IBD - from rectum to cecum but no endoscopy report available. \par \par First Cdiff exposure was June 2017 - treated with vancomycin. \par November 2018 2nd diagnosis - treated twice - with vancomycin + flagyl, and vanco taper again \par December 2018 3rd diagnosis -  on vancomycin since (250mg Q6H) \par \par At last visit: Continues to have fecal incontinence - feels the urge but unable to make it to the bathroom. Usually diarrhea associated incontinence, not formed stool. Reports nocturnal symptoms on occasion, few times per week. AM frequency ~ 5x/day, very loose. Sees blood on toilet paper, not in toilet. Abdominal pain daily LLQ before BMs. BM relieves pain. + nausea/vomiting - takes Zofran PRN every few days. + weight loss ~ 5lbs in 1 month, no appetite. \par \par Patient denies any trauma to the rectum. No anal fistula or fissure. \par \par EIMs: at diagnosis, had PG on legs, no eye complaints, + apthous ulcers that doesn't track disease, no joint pains, but muscle cramps at night \par \par Past treatment included: Lialda, prednisone, colazol, Canasa, Rowasa, Rifaximin, Uceris, and treated with injectafer for HECTOR and NS IVfluids every month - not for the last few months however. \par \par Fam hx: no IBD, maternal grandmother with CRC age 74\par Social hx: no tobacco use, no illicit drug use, NSAID use 5x/month, no alcohol use. \par \par Using tylenol + codeine for LLQ pain started in November. \par \par Been out of work since October 31.

## 2019-02-04 NOTE — REASON FOR VISIT
[Follow-Up: _____] : a [unfilled] follow-up visit [FreeTextEntry1] : IBD / fecal incontinence / recurrent C. Diff

## 2019-02-04 NOTE — CONSULT LETTER
[Dear  ___] : Dear  [unfilled], [Please see my note below.] : Please see my note below. [Sincerely,] : Sincerely, [DrAdams  ___] : Dr. YOUNG [Courtesy Letter:] : I had the pleasure of seeing your patient, [unfilled], in my office today. [FreeTextEntry3] : Gagan Bansal MD\par Director, IBD Program\par [facility]\par \par Associate Professor of Medicine\par [employer]\par [employer] [facility]\par

## 2019-02-04 NOTE — ASSESSMENT
[FreeTextEntry1] : 33 Y F, originally diagnosed with UC 2011, question of Crohn's Disease now that granulomas seen on recent colon biopsy. No e/o small bowel disease. Has been treated for C diff 5 times with vancomycin without success as PCR remains positive and she remains symptomatic despite no endoscopic disease activity.  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 Presents today s/p FMT on Jan 25, with near resolution of symptoms with solid stools 1-2x/day.  Generally, I would NOT advocate checking c diff testing in patients with solid stool, however, if required to reinstate her to work, prefer toxin testing to PCR.  \par \par Cdiff, failed oral therapy\par - off of antibiotics at this time given improvement of symptoms since FMT \par - if Cdiff checks are needed for job, advised not to perform PCR as this as a positive test would only indicate colonization, and not active infection; her most recent Toxin A+B was negative, this is what should be used \par - discussed that dysbiosis related to IBD puts her at higher risk to acquire it, but no data to suggest it should be refractory to treatment. \par - In addition, there is data to suggest that C diff in IBD patients would benefit from IBD therapy escalation (ie. poorly controlled colitis is a RF for C diff; Minna et al, 2018 ), but her most recent cscope was normal appearing\par \par Indeterminate IBD, UC presentation, but recently with granuloma's (+) on biopsy\par - continue mesalamine for now given normal looking colon recently\par - Ddx includes Crohn's , vasculitis and Behcets (she has large tongue/cheek ulcers, biopsied)\par - Once recovered from C diff, we can request the slides for our own review (colon + lip);  serology [facility] today ([facility] can be + in [place] as well); denies genital apthae/eye/skin lesions; consider rheum referral\par - it would be ideal for her to get ahold of her very first colonoscopy report/images\par - advised she call us or Dr. Huitron in another "flare" to perform cscope at time of symptoms\par - suggest she stop/;taper off her narcotinc containing medication\par \par Incontinence - no episodes since FMT \par - if continue, schedule ARM at least 1 month after FMT , but likely this was was related to c diff diarrhea.\par \par Okay for patient to return to work March 10 as she is requesting time to recover in less stressful environment. 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 Advised universal precautions if taking care of Cdiff patient. Discussed avoiding abx for soft indications. \par \par \par F/U with Dr. Huitron

## 2019-02-05 LAB — MPO AB + PR3 PNL SER: NORMAL

## 2019-02-10 ENCOUNTER — APPOINTMENT (OUTPATIENT)
Dept: FAMILY MEDICINE | Facility: CLINIC | Age: 34
End: 2019-02-10
Payer: COMMERCIAL

## 2019-02-10 VITALS
HEIGHT: 66 IN | WEIGHT: 123 LBS | RESPIRATION RATE: 18 BRPM | SYSTOLIC BLOOD PRESSURE: 106 MMHG | OXYGEN SATURATION: 99 % | DIASTOLIC BLOOD PRESSURE: 70 MMHG | HEART RATE: 77 BPM | TEMPERATURE: 98.1 F | BODY MASS INDEX: 19.77 KG/M2

## 2019-02-10 PROCEDURE — 99214 OFFICE O/P EST MOD 30 MIN: CPT

## 2019-02-10 NOTE — PHYSICAL EXAM
[No Acute Distress] : no acute distress [Well Nourished] : well nourished [Well Developed] : well developed [Normal Voice/Communication] : normal voice/communication [Normal Sclera/Conjunctiva] : normal sclera/conjunctiva [PERRL] : pupils equal round and reactive to light [EOMI] : extraocular movements intact [Normal Outer Ear/Nose] : the outer ears and nose were normal in appearance [Normal Oropharynx] : the oropharynx was normal [No JVD] : no jugular venous distention [Supple] : supple [No Lymphadenopathy] : no lymphadenopathy [Thyroid Normal, No Nodules] : the thyroid was normal and there were no nodules present [No Respiratory Distress] : no respiratory distress  [Clear to Auscultation] : lungs were clear to auscultation bilaterally [No Accessory Muscle Use] : no accessory muscle use [Normal Rate] : normal rate  [Regular Rhythm] : with a regular rhythm [Normal S1, S2] : normal S1 and S2 [No Murmur] : no murmur heard [No Carotid Bruits] : no carotid bruits [No Abdominal Bruit] : a ~M bruit was not heard ~T in the abdomen [No Varicosities] : no varicosities [Pedal Pulses Present] : the pedal pulses are present [No Edema] : there was no peripheral edema [No Extremity Clubbing/Cyanosis] : no extremity clubbing/cyanosis [No Palpable Aorta] : no palpable aorta [Soft] : abdomen soft [Non Tender] : non-tender [Non-distended] : non-distended [No Masses] : no abdominal mass palpated [No HSM] : no HSM [Normal Bowel Sounds] : normal bowel sounds [Normal Posterior Cervical Nodes] : no posterior cervical lymphadenopathy [Normal Anterior Cervical Nodes] : no anterior cervical lymphadenopathy [No CVA Tenderness] : no CVA  tenderness [No Spinal Tenderness] : no spinal tenderness [No Joint Swelling] : no joint swelling [Grossly Normal Strength/Tone] : grossly normal strength/tone [No Rash] : no rash [Normal Gait] : normal gait [Coordination Grossly Intact] : coordination grossly intact [No Focal Deficits] : no focal deficits [Deep Tendon Reflexes (DTR)] : deep tendon reflexes were 2+ and symmetric [Speech Grossly Normal] : speech grossly normal [Memory Grossly Normal] : memory grossly normal [Normal Affect] : the affect was normal [Alert and Oriented x3] : oriented to person, place, and time [Normal Mood] : the mood was normal [Normal Insight/Judgement] : insight and judgment were intact

## 2019-02-10 NOTE — HISTORY OF PRESENT ILLNESS
[FreeTextEntry1] : Followup of pseudomembranous colitis [de-identified] : Patient presents for followup of chronic diarrhea/abdominal colic associated with documented pseudomembranous colitis from 10/31/18. Since last office visit, has undergone a FMC on 01/25/19 at [facility]. Since the procedure, patient now has formed bowel movements with only occasional diarrheal stool. Denies melena or hematochezia. Appetite has improved, with marked reduction in abdominal colic and gas. However, continues to lose weight. Has had one negative stool test for clostridium difficile since FMT. Will provide additional stool sample today. Tentatively cleared to return to work with restrictions-desk duty only-on 03/10/19, by gastroenterology consultant.

## 2019-02-10 NOTE — REVIEW OF SYSTEMS
[Recent Change In Weight] : ~T recent weight change [Abdominal Pain] : abdominal pain [Diarrhea] : diarrhea [Discharge] : no discharge [Pain] : no pain [Nausea] : no nausea [Constipation] : no constipation [Vomiting] : no vomiting [Heartburn] : no heartburn [Melena] : no melena [Easy Bleeding] : no easy bleeding [Easy Bruising] : no easy bruising [Swollen Glands] : no swollen glands [FreeTextEntry4] : No oral lesions

## 2019-02-13 LAB
C DIFF TOX GENS STL QL NAA+PROBE: NORMAL
CDIFF BY PCR: NOT DETECTED

## 2019-02-26 ENCOUNTER — OTHER (OUTPATIENT)
Age: 34
End: 2019-02-26

## 2019-02-26 ENCOUNTER — APPOINTMENT (OUTPATIENT)
Dept: GASTROENTEROLOGY | Facility: CLINIC | Age: 34
End: 2019-02-26
Payer: COMMERCIAL

## 2019-02-26 VITALS
BODY MASS INDEX: 19.29 KG/M2 | DIASTOLIC BLOOD PRESSURE: 80 MMHG | WEIGHT: 120 LBS | HEART RATE: 98 BPM | SYSTOLIC BLOOD PRESSURE: 116 MMHG | HEIGHT: 66 IN

## 2019-02-26 PROCEDURE — 99213 OFFICE O/P EST LOW 20 MIN: CPT

## 2019-02-26 RX ORDER — ONDANSETRON HYDROCHLORIDE 4 MG/1
4 TABLET, FILM COATED ORAL
Refills: 0 | Status: DISCONTINUED | COMMUNITY
End: 2019-02-26

## 2019-02-26 RX ORDER — SACCHAROMYCES BOULARDII 50 MG
250 CAPSULE ORAL
Refills: 0 | Status: DISCONTINUED | COMMUNITY
End: 2019-02-26

## 2019-02-26 RX ORDER — ACETAMINOPHEN AND CODEINE 300; 30 MG/1; MG/1
300-30 TABLET ORAL
Qty: 30 | Refills: 0 | Status: DISCONTINUED | COMMUNITY
Start: 2018-02-25 | End: 2019-02-26

## 2019-02-26 RX ORDER — ONDANSETRON 4 MG/1
4 TABLET, ORALLY DISINTEGRATING ORAL
Qty: 9 | Refills: 0 | Status: DISCONTINUED | COMMUNITY
Start: 2018-04-25 | End: 2019-02-26

## 2019-02-26 RX ORDER — UBIDECARENONE 30 MG
CAPSULE ORAL
Refills: 0 | Status: DISCONTINUED | COMMUNITY
End: 2019-02-26

## 2019-02-26 RX ORDER — SUMATRIPTAN 100 MG/1
100 TABLET, FILM COATED ORAL
Qty: 27 | Refills: 0 | Status: COMPLETED | COMMUNITY
Start: 2018-12-30 | End: 2019-02-26

## 2019-02-26 RX ORDER — HYOSCYAMINE SULFATE 0.12 MG/1
0.12 TABLET SUBLINGUAL 3 TIMES DAILY
Qty: 100 | Refills: 2 | Status: DISCONTINUED | COMMUNITY
Start: 2019-02-04 | End: 2019-02-26

## 2019-02-26 RX ORDER — DESOGESTREL/ETHINYL ESTRADIOL AND ETHINYL ESTRADIOL 21-5 (28)
0.15-0.02/0.01 KIT ORAL
Qty: 28 | Refills: 0 | Status: DISCONTINUED | COMMUNITY
Start: 2018-02-13 | End: 2019-02-26

## 2019-02-26 RX ORDER — CLONAZEPAM 0.5 MG/1
0.5 TABLET ORAL
Qty: 60 | Refills: 0 | Status: DISCONTINUED | COMMUNITY
Start: 2018-04-09 | End: 2019-02-26

## 2019-02-26 RX ORDER — FLUTICASONE PROPIONATE 50 MCG
50 SPRAY, SUSPENSION NASAL
Refills: 0 | Status: DISCONTINUED | COMMUNITY
End: 2019-02-26

## 2019-02-26 NOTE — ASSESSMENT
[FreeTextEntry1] : 1. C. diff: fecal transplant scheduled for next Friday for recurrent C. diff\par \par 2. IBD: Continue Mesalamine for now...reassess after fecal transplant\par \par 3. Fecal incontinence: anorectal manometry \par \par \par

## 2019-02-26 NOTE — PHYSICAL EXAM
[General Appearance - Alert] : alert [General Appearance - In No Acute Distress] : in no acute distress [Sclera] : the sclera and conjunctiva were normal [Outer Ear] : the ears and nose were normal in appearance [Neck Appearance] : the appearance of the neck was normal [] : no respiratory distress [Apical Impulse] : the apical impulse was normal [Bowel Sounds] : normal bowel sounds [Abdomen Soft] : soft [No CVA Tenderness] : no ~M costovertebral angle tenderness [Abnormal Walk] : normal gait [Skin Color & Pigmentation] : normal skin color and pigmentation [No Focal Deficits] : no focal deficits [Oriented To Time, Place, And Person] : oriented to person, place, and time [FreeTextEntry1] : deferred

## 2019-02-26 NOTE — HISTORY OF PRESENT ILLNESS
[de-identified] : Presents for follow up after referral to Dr. Bansal for second opinion re: recurrent C. diff ( treated 5 times with Flagyl +/- Vancomycin, fecal incontinence and IBD. plan for recurrent C. diff in fecal transplant which is scheduled this month ( Vancomycin will continue until then). For IBD ( indeterminate) mesalamine was recommended to be continued  given recent normal appearing colonoscopy. Plan is to possibly have path reviewed by [place] pathologist to exclude Behcets disease ( h/o tongue / cheek ulcers). Anal manometry after  fecal transplant

## 2019-02-27 ENCOUNTER — APPOINTMENT (OUTPATIENT)
Dept: GASTROENTEROLOGY | Facility: CLINIC | Age: 34
End: 2019-02-27

## 2019-02-28 LAB
C DIFF TOX GENS STL QL NAA+PROBE: NORMAL
CDIFF BY PCR: NOT DETECTED

## 2019-03-06 ENCOUNTER — APPOINTMENT (OUTPATIENT)
Dept: GASTROENTEROLOGY | Facility: CLINIC | Age: 34
End: 2019-03-06
Payer: COMMERCIAL

## 2019-03-06 VITALS
WEIGHT: 120 LBS | DIASTOLIC BLOOD PRESSURE: 70 MMHG | BODY MASS INDEX: 19.29 KG/M2 | SYSTOLIC BLOOD PRESSURE: 100 MMHG | HEART RATE: 81 BPM | HEIGHT: 66 IN

## 2019-03-06 PROCEDURE — 99215 OFFICE O/P EST HI 40 MIN: CPT

## 2019-03-06 NOTE — PHYSICAL EXAM
[General Appearance - Alert] : alert [General Appearance - In No Acute Distress] : in no acute distress [Sclera] : the sclera and conjunctiva were normal [Outer Ear] : the ears and nose were normal in appearance [Neck Appearance] : the appearance of the neck was normal [] : no respiratory distress [Apical Impulse] : the apical impulse was normal [Abdomen Soft] : soft [Abnormal Walk] : normal gait [Skin Color & Pigmentation] : normal skin color and pigmentation [Cranial Nerves] : cranial nerves 2-12 were intact [No Focal Deficits] : no focal deficits [Oriented To Time, Place, And Person] : oriented to person, place, and time [FreeTextEntry1] : deferred

## 2019-03-06 NOTE — ASSESSMENT
[FreeTextEntry1] : 1. IBD:  Continue Lialda. Suspect majority of sxs at present are IBS related\par \par 2. IBS: Bentyl for intermittent LLQ  pain\par \par 3. C. diff: S/P fecal transplant...monitor for recurrence. Most recent stool was negative for  c. diff

## 2019-03-06 NOTE — HISTORY OF PRESENT ILLNESS
[de-identified] : Underwent fecal transplant ~ 1 month ago at [facility]. . Initially did well with decreased diarrhea, decreased incontinence, decreased pain. Awoke this am with watery / non bloody diarrhea and LLQ pain. In office now...somewhat improved. Denies sick contact / new meds / has not eaten out. Denies fever, chills, melena, hematemesis, BRBPR. Has had  recurrent C. diff ( treated 5 times with Flagyl +/- Vancomycin) , fecal incontinence and IBD. Seen by Dr. Bansal for second opinion re: IBD / c. diff. For IBD ( indeterminate) mesalamine was recommended to be continued  given recent normal appearing colonoscopy. Plan is to possibly have path reviewed by [place] pathologist to exclude Behcets disease ( h/o tongue / cheek ulcers). Anal manometry after  fecal transplant

## 2019-03-06 NOTE — ASSESSMENT
[FreeTextEntry1] : Diarrhea: likely recurrent C. diff vs IBD flare. Check stool for C. diff  ( sample submitted) ...if positive , will refer for for repeat fecal transplant. Further plans pending stool results

## 2019-03-06 NOTE — PHYSICAL EXAM
[General Appearance - Alert] : alert [General Appearance - In No Acute Distress] : in no acute distress [Sclera] : the sclera and conjunctiva were normal [Outer Ear] : the ears and nose were normal in appearance [Abdomen Soft] : soft [No Focal Deficits] : no focal deficits [Oriented To Time, Place, And Person] : oriented to person, place, and time [FreeTextEntry1] : deferred

## 2019-03-06 NOTE — HISTORY OF PRESENT ILLNESS
[de-identified] : Seen 2/26/19 for 48 hours diarrhea. C. diff was negative. Presumed IBS / IBD flare. Lialda continued. Imodium prn recommended. Stool more formed. Still with some fecal incontinence. Underwent fecal transplant ~ 1/25/19  at [facility]. . Initially did well with decreased diarrhea / incontinence / abdominal pain.  Has had  recurrent C. diff ( treated 5 times with Flagyl +/- Vancomycin) , fecal incontinence and IBD. Seen by Dr. Bansal for second opinion re: IBD / c. diff. For IBD ( indeterminate) mesalamine was recommended to be continued  given recent normal appearing colonoscopy. Plan is to possibly have path reviewed by [place] pathologist to exclude Behcets disease ( h/o tongue / cheek ulcers). Anal manometry after  fecal transplant

## 2019-04-12 ENCOUNTER — RESULT REVIEW (OUTPATIENT)
Age: 34
End: 2019-04-12

## 2019-04-12 ENCOUNTER — APPOINTMENT (OUTPATIENT)
Dept: HEMATOLOGY ONCOLOGY | Facility: CLINIC | Age: 34
End: 2019-04-12
Payer: COMMERCIAL

## 2019-04-12 VITALS
HEIGHT: 66 IN | DIASTOLIC BLOOD PRESSURE: 62 MMHG | RESPIRATION RATE: 18 BRPM | SYSTOLIC BLOOD PRESSURE: 99 MMHG | HEART RATE: 90 BPM | WEIGHT: 120 LBS | TEMPERATURE: 97.5 F | OXYGEN SATURATION: 100 % | BODY MASS INDEX: 19.29 KG/M2

## 2019-04-12 PROCEDURE — 99214 OFFICE O/P EST MOD 30 MIN: CPT

## 2019-04-12 NOTE — CONSULT LETTER
[Dear  ___] : Dear  [unfilled], [Consult Letter:] : I had the pleasure of evaluating your patient, [unfilled]. [Please see my note below.] : Please see my note below. [Consult Closing:] : Thank you very much for allowing me to participate in the care of this patient.  If you have any questions, please do not hesitate to contact me. [Sincerely,] : Sincerely, [DrAdams  ___] : Dr. YOUNG [DrAdams ___] : Dr. YOUNG [FreeTextEntry3] : Sridhar Meneses MD, MPH\par Attending Physician\par Hematology Oncology\par [facility] [place]\par [facility]\par

## 2019-04-12 NOTE — ASSESSMENT
[FreeTextEntry1] : IBD/ Premenopausal state without heavy bleeding\par Fatigue, tiredness, exhaustion\par \par Suspect her fatigue is multifactorial \par Component of iron deficiency - better with IV iron\par Blood work reviewed. Ferritin pending- she will call in a few days to discuss\par However fatigue continues to affect her routine- ? related to inflammation related to IBD vs CTD vs fibromyalgia\par AIDEN speckled with 1:80\par Has appointment with Dr Rivera (rheumatology)\par \par Dehydration\par IV NS today and once a month\par \par Follow up in 8 weeks. CBC, CMP, ESR, CRP, ferritin\par \par

## 2019-04-19 ENCOUNTER — TRANSCRIPTION ENCOUNTER (OUTPATIENT)
Age: 34
End: 2019-04-19

## 2019-04-20 ENCOUNTER — TRANSCRIPTION ENCOUNTER (OUTPATIENT)
Age: 34
End: 2019-04-20

## 2019-04-29 ENCOUNTER — MEDICATION RENEWAL (OUTPATIENT)
Age: 34
End: 2019-04-29

## 2019-04-29 ENCOUNTER — APPOINTMENT (OUTPATIENT)
Dept: GASTROENTEROLOGY | Facility: CLINIC | Age: 34
End: 2019-04-29
Payer: COMMERCIAL

## 2019-04-29 VITALS
HEIGHT: 66 IN | DIASTOLIC BLOOD PRESSURE: 72 MMHG | SYSTOLIC BLOOD PRESSURE: 100 MMHG | WEIGHT: 123 LBS | HEART RATE: 76 BPM | BODY MASS INDEX: 19.77 KG/M2

## 2019-04-29 DIAGNOSIS — Z30.41 ENCOUNTER FOR SURVEILLANCE OF CONTRACEPTIVE PILLS: ICD-10-CM

## 2019-04-29 PROCEDURE — 99214 OFFICE O/P EST MOD 30 MIN: CPT

## 2019-04-29 RX ORDER — ESCITALOPRAM OXALATE 10 MG/1
10 TABLET, FILM COATED ORAL
Refills: 0 | Status: DISCONTINUED | COMMUNITY
End: 2019-04-29

## 2019-04-29 NOTE — ASSESSMENT
[FreeTextEntry1] : 1. IBD:  Continue Lialda. Suspect majority of sxs at present are IBS related\par \par 2. IBS: Bentyl for intermittent LLQ  pain\par \par 3. C. diff: S/P fecal transplant...monitor for recurrence. Most recent stool was negative for  c. diff\par \par 4. Fecal incontinence:  Awaiting  manometry appointment  in [facility]  / [facility]

## 2019-04-29 NOTE — PHYSICAL EXAM
[General Appearance - Alert] : alert [General Appearance - In No Acute Distress] : in no acute distress [Sclera] : the sclera and conjunctiva were normal [Outer Ear] : the ears and nose were normal in appearance [Neck Appearance] : the appearance of the neck was normal [] : no respiratory distress [Apical Impulse] : the apical impulse was normal [Abdomen Soft] : soft [FreeTextEntry1] : deferred [No CVA Tenderness] : no ~M costovertebral angle tenderness [Abnormal Walk] : normal gait [Skin Color & Pigmentation] : normal skin color and pigmentation [No Focal Deficits] : no focal deficits [Oriented To Time, Place, And Person] : oriented to person, place, and time

## 2019-04-29 NOTE — HISTORY OF PRESENT ILLNESS
[de-identified] : Prtesents for follow up. Generally feeling well. Still with occasional fecal incontinence. Seen 2/26/19 for 48 hours diarrhea. C. diff was negative. Presumed IBS / IBD flare. Lialda continued. Imodium prn recommended. Stool more formed. Still with some fecal incontinence. Underwent fecal transplant ~ 1/25/19  at [facility]. . Initially did well with decreased diarrhea / incontinence / abdominal pain.  Has had  recurrent C. diff ( treated 5 times with Flagyl +/- Vancomycin) , fecal incontinence and IBD. Seen by Dr. Bansal for second opinion re: IBD / c. diff. For IBD ( indeterminate) mesalamine was recommended to be continued  given recent normal appearing colonoscopy. Plan is to possibly have path reviewed by [place] pathologist to exclude Behcets disease ( h/o tongue / cheek ulcers). Anal manometry after  fecal transplant

## 2019-05-09 ENCOUNTER — LABORATORY RESULT (OUTPATIENT)
Age: 34
End: 2019-05-09

## 2019-05-10 ENCOUNTER — APPOINTMENT (OUTPATIENT)
Dept: OBGYN | Facility: CLINIC | Age: 34
End: 2019-05-10
Payer: COMMERCIAL

## 2019-05-10 VITALS
SYSTOLIC BLOOD PRESSURE: 100 MMHG | DIASTOLIC BLOOD PRESSURE: 68 MMHG | WEIGHT: 120 LBS | HEIGHT: 66 IN | BODY MASS INDEX: 19.29 KG/M2

## 2019-05-10 PROCEDURE — 99385 PREV VISIT NEW AGE 18-39: CPT

## 2019-05-10 NOTE — PHYSICAL EXAM
[Alert] : alert [Acute Distress] : no acute distress [Awake] : awake [Mass] : no breast mass [Axillary LAD] : no axillary lymphadenopathy [Nipple Discharge] : no nipple discharge [Soft] : soft [Oriented x3] : oriented to person, place, and time [Tender] : non tender [Normal] : uterus [Uterine Adnexae] : were not tender and not enlarged [No Bleeding] : there was no active vaginal bleeding

## 2019-05-13 ENCOUNTER — LABORATORY RESULT (OUTPATIENT)
Age: 34
End: 2019-05-13

## 2019-05-13 LAB
C TRACH RRNA SPEC QL NAA+PROBE: NOT DETECTED
HCV RNA SERPL NAA DL=5-ACNC: NOT DETECTED IU/ML
HCV RNA SERPL NAA+PROBE-LOG IU: NOT DETECTED LOGIU/ML
HIV1+2 AB SPEC QL IA.RAPID: NONREACTIVE
HPV HIGH+LOW RISK DNA PNL CVX: NOT DETECTED
N GONORRHOEA RRNA SPEC QL NAA+PROBE: NOT DETECTED
RPR SER-TITR: NORMAL
SOURCE TP AMPLIFICATION: NORMAL

## 2019-05-15 ENCOUNTER — APPOINTMENT (OUTPATIENT)
Dept: OBGYN | Facility: CLINIC | Age: 34
End: 2019-05-15
Payer: COMMERCIAL

## 2019-05-15 PROCEDURE — 81002 URINALYSIS NONAUTO W/O SCOPE: CPT

## 2019-05-16 ENCOUNTER — RX CHANGE (OUTPATIENT)
Age: 34
End: 2019-05-16

## 2019-05-16 LAB
CYTOLOGY CVX/VAG DOC THIN PREP: NORMAL
HBV E AG SER QL: NEGATIVE

## 2019-05-20 ENCOUNTER — APPOINTMENT (OUTPATIENT)
Dept: OBGYN | Facility: CLINIC | Age: 34
End: 2019-05-20

## 2019-05-28 ENCOUNTER — INBOUND DOCUMENT (OUTPATIENT)
Age: 34
End: 2019-05-28

## 2019-06-12 LAB — BACTERIA UR CULT: NORMAL

## 2019-07-12 ENCOUNTER — RESULT REVIEW (OUTPATIENT)
Age: 34
End: 2019-07-12

## 2019-07-12 ENCOUNTER — APPOINTMENT (OUTPATIENT)
Dept: HEMATOLOGY ONCOLOGY | Facility: CLINIC | Age: 34
End: 2019-07-12
Payer: COMMERCIAL

## 2019-07-12 VITALS
RESPIRATION RATE: 16 BRPM | HEIGHT: 65.98 IN | DIASTOLIC BLOOD PRESSURE: 68 MMHG | SYSTOLIC BLOOD PRESSURE: 124 MMHG | OXYGEN SATURATION: 100 % | WEIGHT: 123 LBS | TEMPERATURE: 98.5 F | BODY MASS INDEX: 19.77 KG/M2 | HEART RATE: 84 BPM

## 2019-07-12 VITALS
DIASTOLIC BLOOD PRESSURE: 83 MMHG | RESPIRATION RATE: 16 BRPM | OXYGEN SATURATION: 95 % | TEMPERATURE: 99.2 F | BODY MASS INDEX: 30.69 KG/M2 | HEIGHT: 65.98 IN | HEART RATE: 76 BPM | WEIGHT: 190.99 LBS | SYSTOLIC BLOOD PRESSURE: 134 MMHG

## 2019-07-12 PROCEDURE — 99214 OFFICE O/P EST MOD 30 MIN: CPT

## 2019-07-12 NOTE — HISTORY OF PRESENT ILLNESS
[de-identified] : Ms Cuevas is a very pleasant 32 year old patient with IBD here with anemia\par \par IBD/UC vs Crohns since 2011- gets flair ups - liaza 4 tab/day. Canaza supp\par Was recently on prednisone - stopped due to rash - 2-3 weeks\par \par LMP 4/5/18 - Are not heavy\par \par Every 2 months had small amount of blood in stool\par \par Feels tired and exhausted\par She is s/p fecal transplant for C Diff. \par Feels better - Has fecal incontinence - scheduled to see GI at [facility] for ARM\par \par Stool is more formed, but occasionally has diarrhea\par \par Follows with with Dr Huitron (GI) and Dr Bansal ([place])\par \par  [de-identified] : She is seen today for follow up. \par \par Overall doing better\par No more diarrhea

## 2019-07-12 NOTE — ASSESSMENT
[FreeTextEntry1] : IBD/ Premenopausal state without heavy bleeding\par Fatigue, tiredness, exhaustion- better since her diarrhea/C Diff is better with fecal implant\par \par Blood work reviewed. Ferritin pending- she will call in a few days to discuss\par However fatigue continues to affect her routine- ? related to inflammation related to IBD vs CTD vs fibromyalgia\par AIDEN speckled with 1:80\par \par Dehydration\par IV NS next week\par \par Follow up in 12 weeks. CBC, CMP, ESR, CRP, ferritin\par \par

## 2019-07-12 NOTE — CONSULT LETTER
[Dear  ___] : Dear  [unfilled], [Consult Letter:] : I had the pleasure of evaluating your patient, [unfilled]. [Please see my note below.] : Please see my note below. [Consult Closing:] : Thank you very much for allowing me to participate in the care of this patient.  If you have any questions, please do not hesitate to contact me. [Sincerely,] : Sincerely, [DrAdams  ___] : Dr. YOUNG [DrAdams ___] : Dr. YOUNG [FreeTextEntry3] : Sridhar Meneses MD, MPH\par Attending Physician\par Hematology Oncology\par [facility] [place]\par [facility]\par

## 2019-07-19 ENCOUNTER — APPOINTMENT (OUTPATIENT)
Dept: GASTROENTEROLOGY | Facility: CLINIC | Age: 34
End: 2019-07-19
Payer: COMMERCIAL

## 2019-07-19 VITALS
DIASTOLIC BLOOD PRESSURE: 66 MMHG | WEIGHT: 122 LBS | BODY MASS INDEX: 20.33 KG/M2 | SYSTOLIC BLOOD PRESSURE: 100 MMHG | HEIGHT: 65 IN | HEART RATE: 68 BPM

## 2019-07-19 PROCEDURE — 99215 OFFICE O/P EST HI 40 MIN: CPT

## 2019-07-19 RX ORDER — DESOGESTREL/ETHINYL ESTRADIOL AND ETHINYL ESTRADIOL 21-5 (28)
0.15-0.02/0.01 KIT ORAL DAILY
Qty: 1 | Refills: 0 | Status: DISCONTINUED | COMMUNITY
End: 2019-07-19

## 2019-07-19 RX ORDER — LEVOFLOXACIN 250 MG/1
250 TABLET, FILM COATED ORAL DAILY
Qty: 3 | Refills: 0 | Status: DISCONTINUED | COMMUNITY
Start: 2019-05-16 | End: 2019-07-19

## 2019-07-19 NOTE — ASSESSMENT
[FreeTextEntry1] : 1. IBD:  Continue Lialda. Suspect majority of sxs at present are IBS related. Labs every 6 months. Colonoscopy in 11 / 2020\par \par 2. IBS: Bentyl for intermittent LLQ  pain\par \par 3. C. diff: S/P fecal transplant...monitor for recurrence. Most recent stool was negative for  c. diff\par \par 4. Fecal incontinence:  Awaiting  manometry appointment  in [facility]  / [facility]

## 2019-07-19 NOTE — HISTORY OF PRESENT ILLNESS
[de-identified] : Presents for follow up. Generally doing well with occasional urgency and fecal incontinence. Denies tenesmus / BRBPR. Stools are formed. Recent labs from 6/2019 were reviewed by me and were unremarkable. At 4/2019 visit was generally feeling well with occasional fecal incontinence . Seen 2/26/19 for 48 hours diarrhea. C. diff was negative. Presumed IBS / IBD flare. Lialda continued. Imodium prn recommended. Stool more formed.  Underwent fecal transplant ~ 1/25/19  at [facility]. . Initially did well with decreased diarrhea / incontinence / abdominal pain.  Has had  recurrent C. diff ( treated 5 times with Flagyl +/- Vancomycin) , fecal incontinence and IBD. Seen by Dr. Bansal for second opinion re: IBD / c. diff. For IBD ( indeterminate) mesalamine was recommended to be continued  given recent normal appearing colonoscopy. Plan is to possibly have path reviewed by [place] pathologist to exclude Behcets disease ( h/o tongue / cheek ulcers). Anal manometry after  fecal transplant.  Colonoscopy 11/2018 by me was notable for focal cryptitis / colitis in rectal and transverse colon biopsies only. EGD at the same time was unremarkable

## 2019-07-19 NOTE — PHYSICAL EXAM
[General Appearance - Alert] : alert [General Appearance - In No Acute Distress] : in no acute distress [Sclera] : the sclera and conjunctiva were normal [Outer Ear] : the ears and nose were normal in appearance [Neck Appearance] : the appearance of the neck was normal [] : no respiratory distress [Apical Impulse] : the apical impulse was normal [Abdomen Soft] : soft [No CVA Tenderness] : no ~M costovertebral angle tenderness [Abnormal Walk] : normal gait [Skin Color & Pigmentation] : normal skin color and pigmentation [No Focal Deficits] : no focal deficits [Oriented To Time, Place, And Person] : oriented to person, place, and time [FreeTextEntry1] : deferred

## 2019-08-25 ENCOUNTER — TRANSCRIPTION ENCOUNTER (OUTPATIENT)
Age: 34
End: 2019-08-25

## 2019-09-08 ENCOUNTER — APPOINTMENT (OUTPATIENT)
Dept: FAMILY MEDICINE | Facility: CLINIC | Age: 34
End: 2019-09-08
Payer: COMMERCIAL

## 2019-09-08 VITALS
HEIGHT: 65 IN | BODY MASS INDEX: 20.83 KG/M2 | WEIGHT: 125 LBS | RESPIRATION RATE: 17 BRPM | SYSTOLIC BLOOD PRESSURE: 118 MMHG | DIASTOLIC BLOOD PRESSURE: 70 MMHG | HEART RATE: 86 BPM | OXYGEN SATURATION: 98 %

## 2019-09-08 PROCEDURE — 99213 OFFICE O/P EST LOW 20 MIN: CPT | Mod: 25

## 2019-09-08 PROCEDURE — 90471 IMMUNIZATION ADMIN: CPT

## 2019-09-08 PROCEDURE — 90732 PPSV23 VACC 2 YRS+ SUBQ/IM: CPT

## 2019-09-09 ENCOUNTER — APPOINTMENT (OUTPATIENT)
Dept: FAMILY MEDICINE | Facility: CLINIC | Age: 34
End: 2019-09-09
Payer: COMMERCIAL

## 2019-09-09 PROCEDURE — 90686 IIV4 VACC NO PRSV 0.5 ML IM: CPT

## 2019-09-09 PROCEDURE — 90471 IMMUNIZATION ADMIN: CPT

## 2019-09-09 NOTE — PHYSICAL EXAM
[Well Nourished] : well nourished [No Acute Distress] : no acute distress [Well Developed] : well developed [Well-Appearing] : well-appearing [Normal Voice/Communication] : normal voice/communication [Normal Sclera/Conjunctiva] : normal sclera/conjunctiva [EOMI] : extraocular movements intact [PERRL] : pupils equal round and reactive to light [Normal Outer Ear/Nose] : the outer ears and nose were normal in appearance [No JVD] : no jugular venous distention [Normal Oropharynx] : the oropharynx was normal [No Lymphadenopathy] : no lymphadenopathy [Supple] : supple [Thyroid Normal, No Nodules] : the thyroid was normal and there were no nodules present [No Respiratory Distress] : no respiratory distress  [No Accessory Muscle Use] : no accessory muscle use [Clear to Auscultation] : lungs were clear to auscultation bilaterally [Normal Rate] : normal rate  [Normal S1, S2] : normal S1 and S2 [Regular Rhythm] : with a regular rhythm [No Murmur] : no murmur heard [No Carotid Bruits] : no carotid bruits [No Varicosities] : no varicosities [No Abdominal Bruit] : a ~M bruit was not heard ~T in the abdomen [Pedal Pulses Present] : the pedal pulses are present [No Palpable Aorta] : no palpable aorta [No Edema] : there was no peripheral edema [No Extremity Clubbing/Cyanosis] : no extremity clubbing/cyanosis [Non Tender] : non-tender [Soft] : abdomen soft [Non-distended] : non-distended [No Masses] : no abdominal mass palpated [No HSM] : no HSM [Normal Bowel Sounds] : normal bowel sounds [Normal Anterior Cervical Nodes] : no anterior cervical lymphadenopathy [Normal Posterior Cervical Nodes] : no posterior cervical lymphadenopathy [No CVA Tenderness] : no CVA  tenderness [No Joint Swelling] : no joint swelling [No Spinal Tenderness] : no spinal tenderness [Grossly Normal Strength/Tone] : grossly normal strength/tone [Coordination Grossly Intact] : coordination grossly intact [No Focal Deficits] : no focal deficits [Normal Gait] : normal gait [Deep Tendon Reflexes (DTR)] : deep tendon reflexes were 2+ and symmetric [Normal Affect] : the affect was normal [Normal Insight/Judgement] : insight and judgment were intact [de-identified] : Grade 1-2 acne vulgaris over forehead, chin and checks

## 2019-09-09 NOTE — HISTORY OF PRESENT ILLNESS
[de-identified] : Patient presents for immunization update, migraine headaches, and evaluation of facial skin eruption. Needs Pneumovax 23 vaccine to complete pneumococcal immunization cycle. Also, needs refills for Imitrex and Zofran her migraine headaches. Medications controls symptoms well. In addition, has noted recurrence of acne vulgaris eruption over the forehead, cheeks, and chin for the last 3-4 months. [FreeTextEntry1] : Skin eruption and pneumococcal immunization

## 2019-09-10 NOTE — HISTORY OF PRESENT ILLNESS
[FreeTextEntry1] : Influenza immunization [de-identified] : Patient presents for influenza immunization administration.

## 2019-09-16 ENCOUNTER — MEDICATION RENEWAL (OUTPATIENT)
Age: 34
End: 2019-09-16

## 2019-09-18 ENCOUNTER — TRANSCRIPTION ENCOUNTER (OUTPATIENT)
Age: 34
End: 2019-09-18

## 2019-10-11 ENCOUNTER — APPOINTMENT (OUTPATIENT)
Dept: HEMATOLOGY ONCOLOGY | Facility: CLINIC | Age: 34
End: 2019-10-11
Payer: COMMERCIAL

## 2019-10-11 ENCOUNTER — RESULT REVIEW (OUTPATIENT)
Age: 34
End: 2019-10-11

## 2019-10-11 ENCOUNTER — APPOINTMENT (OUTPATIENT)
Dept: GASTROENTEROLOGY | Facility: CLINIC | Age: 34
End: 2019-10-11
Payer: COMMERCIAL

## 2019-10-11 VITALS
HEART RATE: 70 BPM | HEIGHT: 66 IN | TEMPERATURE: 99.4 F | BODY MASS INDEX: 19.2 KG/M2 | WEIGHT: 119.5 LBS | SYSTOLIC BLOOD PRESSURE: 133 MMHG | DIASTOLIC BLOOD PRESSURE: 64 MMHG | OXYGEN SATURATION: 95 %

## 2019-10-11 VITALS
HEART RATE: 90 BPM | OXYGEN SATURATION: 99 % | SYSTOLIC BLOOD PRESSURE: 122 MMHG | WEIGHT: 119 LBS | DIASTOLIC BLOOD PRESSURE: 79 MMHG | HEIGHT: 64.96 IN | RESPIRATION RATE: 16 BRPM | BODY MASS INDEX: 19.83 KG/M2 | TEMPERATURE: 98.6 F

## 2019-10-11 PROCEDURE — 99214 OFFICE O/P EST MOD 30 MIN: CPT

## 2019-10-11 PROCEDURE — 99215 OFFICE O/P EST HI 40 MIN: CPT

## 2019-10-11 RX ORDER — CLINDAMYCIN PHOSPHATE 1 G/10ML
1 GEL TOPICAL TWICE DAILY
Qty: 1 | Refills: 0 | Status: DISCONTINUED | COMMUNITY
Start: 2019-09-16 | End: 2019-10-11

## 2019-10-11 RX ORDER — CLINDAMYCIN AND BENZOYL PEROXIDE 50; 10 MG/G; MG/G
1-5 GEL TOPICAL TWICE DAILY
Qty: 2 | Refills: 0 | Status: DISCONTINUED | COMMUNITY
Start: 2019-09-09 | End: 2019-10-11

## 2019-10-11 NOTE — HISTORY OF PRESENT ILLNESS
[de-identified] : Ms Cuevas is a very pleasant 32 year old patient with IBD here with anemia\par \par IBD/UC vs Crohns since 2011- gets flair ups - liaza 4 tab/day. Canaza supp\par Was recently on prednisone - stopped due to rash - 2-3 weeks\par \par LMP 4/5/18 - Are not heavy\par \par Every 2 months had small amount of blood in stool\par \par Feels tired and exhausted\par She is s/p fecal transplant for C Diff. \par Feels better - Has fecal incontinence - scheduled to see GI at [facility] for ARM\par \par Stool is more formed, but occasionally has diarrhea\par \par Follows with with Dr Huitron (GI) and Dr Bansal ([place])\par \par  [de-identified] : She is seen today for follow up. \par \par She has started to feel tired and exhausted\par Also has mucus in stool- scheduled to see Dr Huitron today\par

## 2019-10-11 NOTE — CONSULT LETTER
[Dear  ___] : Dear  [unfilled], [Consult Letter:] : I had the pleasure of evaluating your patient, [unfilled]. [Please see my note below.] : Please see my note below. [Consult Closing:] : Thank you very much for allowing me to participate in the care of this patient.  If you have any questions, please do not hesitate to contact me. [Sincerely,] : Sincerely, [DrAdams  ___] : Dr. YOUNG [DrAdams ___] : Dr. YOUNG [FreeTextEntry3] : Sridhar Meneses MD, MPH\par Attending Physician\par Hematology Oncology\par [facility] [place]\par [facility]\par

## 2019-10-11 NOTE — HISTORY OF PRESENT ILLNESS
[de-identified] : . Presents with a  48 hour complaint of lower abdominal cramping ( relieved by bowel movement) , mucoid stools and increased stool frequency ( 5-6 semi solid stools daily). recent labs from 10/11/19 reviewed and  reveal normal CRP / WBC / BMET / LFTS. Admits to increased stress an anxiety secondary to the suicide attempt of her younger cousin.  Denies tenesmus / urgency / BRBPR / fever / chills / recent antibiotics. Last seen in July when she was generally doing well with occasional urgency and fecal incontinence. Denied tenesmus / BRBPR. Stools were formed. Labs from 6/2019 were reviewed by me and were unremarkable. At 4/2019 visit was generally feeling well with occasional fecal incontinence . Seen 2/26/19 for 48 hours diarrhea. C. diff was negative. Presumed IBS / IBD flare. Lialda continued. Imodium prn recommended. Stool more formed.  Underwent fecal transplant ~ 1/25/19  at [facility]. . Initially did well with decreased diarrhea / incontinence / abdominal pain.  Has had  recurrent C. diff ( treated 5 times with Flagyl +/- Vancomycin) , fecal incontinence and IBD. Seen by Dr. Bansal for second opinion re: IBD / c. diff. For IBD ( indeterminate) mesalamine was recommended to be continued  given recent normal appearing colonoscopy. Plan is to possibly have path reviewed by [place] pathologist to exclude Behcets disease ( h/o tongue / cheek ulcers).Colonoscopy 11/2018 by me was notable for focal cryptitis / colitis in rectal and transverse colon biopsies only. EGD at the same time was unremarkable

## 2019-10-11 NOTE — ASSESSMENT
[FreeTextEntry1] : Iron deficiency \par IBD/ Premenopausal state without heavy bleeding\par Fatigue, tiredness, exhaustion- better since her diarrhea/C Diff is better with fecal implant\par Improved with IV iron\par Blood work reviewed. Ferritin pending- she will call in a few days to discuss\par However fatigue continues to affect her routine- ? related to inflammation related to IBD vs CTD vs fibromyalgia\par AIDEN speckled with 1:80\par \par Dehydration\par IV NS today\par \par Follow up in 12 weeks. CBC, CMP, ESR, CRP, ferritin\par \par

## 2019-10-11 NOTE — PHYSICAL EXAM
[General Appearance - Alert] : alert [General Appearance - In No Acute Distress] : in no acute distress [Outer Ear] : the ears and nose were normal in appearance [Neck Appearance] : the appearance of the neck was normal [Sclera] : the sclera and conjunctiva were normal [Apical Impulse] : the apical impulse was normal [] : no respiratory distress [Abdomen Soft] : soft [No CVA Tenderness] : no ~M costovertebral angle tenderness [Abnormal Walk] : normal gait [Oriented To Time, Place, And Person] : oriented to person, place, and time [No Focal Deficits] : no focal deficits [Skin Color & Pigmentation] : normal skin color and pigmentation [FreeTextEntry1] : deferred

## 2019-10-11 NOTE — REVIEW OF SYSTEMS
56 y/o M hx Depression, Schizophrenia, DM, HTN, HLD transferred from ED on June 10th b/o depression and bizarre behaviour. -170s despite being on losartan 100mg daily and Metorpolol ER 100mg daily. HCTZ 12.5 mg daily added. Will follow up with full consult. [FreeTextEntry2] : 10 point review of systems negative except as outlined in HPI

## 2019-10-15 LAB
BACTERIA STL CULT: NORMAL
C DIFF TOX GENS STL QL NAA+PROBE: NORMAL
CALPROTECTIN FECAL: <16 UG/G
CDIFF BY PCR: NOT DETECTED

## 2019-10-17 LAB
LACTOFERRIN STL-MCNC: <1
PANCREATIC ELASTASE, FECAL: >500

## 2019-10-22 LAB — DEPRECATED O AND P PREP STL: NORMAL

## 2019-11-08 ENCOUNTER — APPOINTMENT (OUTPATIENT)
Dept: OBGYN | Facility: CLINIC | Age: 34
End: 2019-11-08
Payer: COMMERCIAL

## 2019-11-08 VITALS
HEIGHT: 66 IN | BODY MASS INDEX: 18.96 KG/M2 | WEIGHT: 118 LBS | SYSTOLIC BLOOD PRESSURE: 110 MMHG | DIASTOLIC BLOOD PRESSURE: 80 MMHG

## 2019-11-08 PROCEDURE — 99214 OFFICE O/P EST MOD 30 MIN: CPT

## 2019-11-11 LAB
APPEARANCE: CLEAR
BACTERIA UR CULT: NORMAL
BACTERIA: NEGATIVE
BILIRUBIN URINE: NEGATIVE
BLOOD URINE: NEGATIVE
COLOR: NORMAL
FSH SERPL-MCNC: 2.9 IU/L
GLUCOSE QUALITATIVE U: NEGATIVE
HYALINE CASTS: 0 /LPF
KETONES URINE: NEGATIVE
LEUKOCYTE ESTERASE URINE: ABNORMAL
LH SERPL-ACNC: 5.4 IU/L
MICROSCOPIC-UA: NORMAL
NITRITE URINE: NEGATIVE
PH URINE: 7
PROTEIN URINE: NEGATIVE
RED BLOOD CELLS URINE: 2 /HPF
SPECIFIC GRAVITY URINE: 1
SQUAMOUS EPITHELIAL CELLS: 2 /HPF
TSH SERPL-ACNC: 1.66 UIU/ML
UROBILINOGEN URINE: NORMAL
WHITE BLOOD CELLS URINE: 39 /HPF

## 2019-11-12 ENCOUNTER — RESULT REVIEW (OUTPATIENT)
Age: 34
End: 2019-11-12

## 2019-11-13 ENCOUNTER — APPOINTMENT (OUTPATIENT)
Dept: GASTROENTEROLOGY | Facility: CLINIC | Age: 34
End: 2019-11-13
Payer: COMMERCIAL

## 2019-11-13 VITALS
DIASTOLIC BLOOD PRESSURE: 68 MMHG | WEIGHT: 118 LBS | HEIGHT: 66 IN | SYSTOLIC BLOOD PRESSURE: 120 MMHG | HEART RATE: 75 BPM | BODY MASS INDEX: 18.96 KG/M2

## 2019-11-13 LAB
TESTOST BND SERPL-MCNC: 0.7 PG/ML
TESTOST SERPL-MCNC: 25.4 NG/DL

## 2019-11-13 PROCEDURE — 99215 OFFICE O/P EST HI 40 MIN: CPT

## 2019-11-13 NOTE — HISTORY OF PRESENT ILLNESS
[de-identified] : Presents c/o 2 weeks  on intermittent emesis ( bilious / non bloody). Mostly in morning and evening. Admits to decreases water and food intake. Found to be relatively hypotensive. Still with increased stress. Last seen 10/2019 with a  48 hour complaint of lower abdominal cramping ( relieved by bowel movement) , mucoid stools and increased stool frequency ( 5-6 semi solid stools daily). Stool studies, including calprotectin  and labs including CRP / ESR were normal.  Labs from 10/11/19 reviewed and  reveal normal CRP / WBC / BMET / LFTS. Admits to increased stress an anxiety secondary to the suicide attempt of her younger cousin.  Denies tenesmus / urgency / BRBPR / fever / chills / recent antibiotics. Last seen in July when she was generally doing well with occasional urgency and fecal incontinence. Denied tenesmus / BRBPR. Stools were formed. Labs from 6/2019 were reviewed by me and were unremarkable. At 4/2019 visit was generally feeling well with occasional fecal incontinence . Seen 2/26/19 for 48 hours diarrhea. C. diff was negative. Presumed IBS / IBD flare. Lialda continued. Imodium prn recommended. Stool more formed.  Underwent fecal transplant ~ 1/25/19  at [facility]. . Initially did well with decreased diarrhea / incontinence / abdominal pain.  Has had  recurrent C. diff ( treated 5 times with Flagyl +/- Vancomycin) , fecal incontinence and IBD. Seen by Dr. Bansal for second opinion re: IBD / c. diff. For IBD ( indeterminate) mesalamine was recommended to be continued  given recent normal appearing colonoscopy. Plan is to possibly have path reviewed by [place] pathologist to exclude Behcets disease ( h/o tongue / cheek ulcers).Colonoscopy 11/2018 by me was notable for focal cryptitis / colitis in rectal and transverse colon biopsies only. EGD at the same time was unremarkable

## 2019-11-13 NOTE — ASSESSMENT
[FreeTextEntry1] : Suspect majority of sxs  related to IBS and life stress. Dehydration from inadequate water intake may also be a contributing factor. \par \par 1. Nausea / vomiting:  GES scheduled . EGD scheduled\par \par 2. IBD: Continue Lialda 2 tabs daily\par \par 3. IBS: Levsin for intermittent LLQ  pain / Digestive advantage recommended\par \par 4. C. diff: S/P fecal transplant...monitor for recurrence. Most recent stool was negative for  c. diff\par \par 4. Fecal incontinence:  Awaiting  manometry appointment  in [facility]  / [facility]

## 2019-11-14 ENCOUNTER — APPOINTMENT (OUTPATIENT)
Dept: GASTROENTEROLOGY | Facility: HOSPITAL | Age: 34
End: 2019-11-14

## 2019-11-14 ENCOUNTER — RESULT REVIEW (OUTPATIENT)
Age: 34
End: 2019-11-14

## 2019-11-14 LAB — ANTI-MUELLERIAN HORMONE: 1.8 NG/ML

## 2019-11-20 ENCOUNTER — RESULT REVIEW (OUTPATIENT)
Age: 34
End: 2019-11-20

## 2020-01-02 ENCOUNTER — NON-APPOINTMENT (OUTPATIENT)
Age: 35
End: 2020-01-02

## 2020-01-02 ENCOUNTER — APPOINTMENT (OUTPATIENT)
Dept: FAMILY MEDICINE | Facility: CLINIC | Age: 35
End: 2020-01-02
Payer: COMMERCIAL

## 2020-01-02 VITALS
HEART RATE: 77 BPM | WEIGHT: 118 LBS | TEMPERATURE: 98.9 F | RESPIRATION RATE: 18 BRPM | OXYGEN SATURATION: 99 % | DIASTOLIC BLOOD PRESSURE: 60 MMHG | HEIGHT: 66 IN | SYSTOLIC BLOOD PRESSURE: 100 MMHG | BODY MASS INDEX: 18.96 KG/M2

## 2020-01-02 DIAGNOSIS — L70.0 ACNE VULGARIS: ICD-10-CM

## 2020-01-02 DIAGNOSIS — Z87.19 PERSONAL HISTORY OF OTHER DISEASES OF THE DIGESTIVE SYSTEM: ICD-10-CM

## 2020-01-02 LAB
DATE COLLECTED: NORMAL
HEMOCCULT SP1 STL QL: POSITIVE

## 2020-01-02 PROCEDURE — 99395 PREV VISIT EST AGE 18-39: CPT | Mod: 25

## 2020-01-02 PROCEDURE — 92552 PURE TONE AUDIOMETRY AIR: CPT

## 2020-01-02 PROCEDURE — 99173 VISUAL ACUITY SCREEN: CPT | Mod: 79

## 2020-01-02 PROCEDURE — 94010 BREATHING CAPACITY TEST: CPT

## 2020-01-02 PROCEDURE — 82270 OCCULT BLOOD FECES: CPT

## 2020-01-02 PROCEDURE — 36415 COLL VENOUS BLD VENIPUNCTURE: CPT

## 2020-01-02 PROCEDURE — 99213 OFFICE O/P EST LOW 20 MIN: CPT | Mod: 25

## 2020-01-02 PROCEDURE — 93000 ELECTROCARDIOGRAM COMPLETE: CPT

## 2020-01-02 RX ORDER — MESALAMINE 1.2 G/1
1.2 TABLET, DELAYED RELEASE ORAL DAILY
Refills: 0 | Status: ACTIVE | COMMUNITY

## 2020-01-02 RX ORDER — MULTIVIT-MIN/IRON/FOLIC ACID/K 18-600-40
CAPSULE ORAL
Refills: 0 | Status: ACTIVE | COMMUNITY

## 2020-01-02 RX ORDER — NITROFURANTOIN (MONOHYDRATE/MACROCRYSTALS) 25; 75 MG/1; MG/1
100 CAPSULE ORAL
Qty: 10 | Refills: 0 | Status: DISCONTINUED | COMMUNITY
Start: 2019-11-08 | End: 2020-01-02

## 2020-01-06 ENCOUNTER — APPOINTMENT (OUTPATIENT)
Dept: FAMILY MEDICINE | Facility: CLINIC | Age: 35
End: 2020-01-06
Payer: COMMERCIAL

## 2020-01-06 VITALS
RESPIRATION RATE: 17 BRPM | SYSTOLIC BLOOD PRESSURE: 112 MMHG | HEIGHT: 66 IN | OXYGEN SATURATION: 95 % | HEART RATE: 74 BPM | BODY MASS INDEX: 19.29 KG/M2 | TEMPERATURE: 98.7 F | WEIGHT: 120 LBS | DIASTOLIC BLOOD PRESSURE: 58 MMHG

## 2020-01-06 PROCEDURE — 99214 OFFICE O/P EST MOD 30 MIN: CPT | Mod: 25

## 2020-01-06 PROCEDURE — 81002 URINALYSIS NONAUTO W/O SCOPE: CPT

## 2020-01-07 RX ORDER — CEFUROXIME AXETIL 500 MG
500 TABLET ORAL
Refills: 0 | Status: COMPLETED | COMMUNITY
End: 2020-01-07

## 2020-01-07 NOTE — HISTORY OF PRESENT ILLNESS
[FreeTextEntry1] : Urgency, frequency, and dysuria x3 days [de-identified] : Patient presents with a history x2 days of worsening urgency, frequency, and dysuria. No visualized hematuria. Urine has been foul-smelling and turbid. Reports this is the 3rd urinary tract infection in the last 9 months. Recently finished 10 day course of treatment for presumed UTI with Ceftin 500 mg q12 hrs. Last dose taken as 4 days ago. Denies any recent sexual activity.

## 2020-01-07 NOTE — PHYSICAL EXAM
[No Acute Distress] : no acute distress [Well Nourished] : well nourished [Well Developed] : well developed [Well-Appearing] : well-appearing [Normal Voice/Communication] : normal voice/communication [Normal Outer Ear/Nose] : the outer ears and nose were normal in appearance [Normal Oropharynx] : the oropharynx was normal [No JVD] : no jugular venous distention [No Lymphadenopathy] : no lymphadenopathy [Supple] : supple [No Respiratory Distress] : no respiratory distress  [No Accessory Muscle Use] : no accessory muscle use [Clear to Auscultation] : lungs were clear to auscultation bilaterally [Normal Rate] : normal rate  [Regular Rhythm] : with a regular rhythm [Normal S1, S2] : normal S1 and S2 [No Murmur] : no murmur heard [Soft] : abdomen soft [Non Tender] : non-tender [Non-distended] : non-distended [No Masses] : no abdominal mass palpated [No HSM] : no HSM [Normal Bowel Sounds] : normal bowel sounds [Normal Posterior Cervical Nodes] : no posterior cervical lymphadenopathy [Normal Anterior Cervical Nodes] : no anterior cervical lymphadenopathy [Normal Inguinal Nodes] : no inguinal lymphadenopathy [No CVA Tenderness] : no CVA  tenderness [No Spinal Tenderness] : no spinal tenderness [No Rash] : no rash [Speech Grossly Normal] : speech grossly normal [Normal Affect] : the affect was normal [Alert and Oriented x3] : oriented to person, place, and time [Normal Insight/Judgement] : insight and judgment were intact

## 2020-01-07 NOTE — REVIEW OF SYSTEMS
[Dysuria] : dysuria [Frequency] : frequency [Negative] : Heme/Lymph [Fever] : no fever [Chills] : no chills [Abdominal Pain] : no abdominal pain [Nausea] : no nausea [Vomiting] : no vomiting [Incontinence] : no incontinence [Nocturia] : no nocturia [Hematuria] : no hematuria [Vaginal Discharge] : no vaginal discharge [Skin Rash] : no skin rash

## 2020-01-08 ENCOUNTER — APPOINTMENT (OUTPATIENT)
Dept: GASTROENTEROLOGY | Facility: CLINIC | Age: 35
End: 2020-01-08
Payer: COMMERCIAL

## 2020-01-08 VITALS
BODY MASS INDEX: 19.61 KG/M2 | HEIGHT: 66 IN | HEART RATE: 100 BPM | SYSTOLIC BLOOD PRESSURE: 106 MMHG | WEIGHT: 122 LBS | DIASTOLIC BLOOD PRESSURE: 66 MMHG

## 2020-01-08 PROCEDURE — 99215 OFFICE O/P EST HI 40 MIN: CPT

## 2020-01-08 RX ORDER — CLINDAMYCIN PHOSPHATE AND BENZOYL PEROXIDE 10; 25 MG/G; MG/G
1.2-2.5 GEL TOPICAL
Qty: 3 | Refills: 0 | Status: DISCONTINUED | COMMUNITY
Start: 2020-01-02 | End: 2020-01-08

## 2020-01-08 NOTE — HISTORY OF PRESENT ILLNESS
[de-identified] : Presents for f/u for IBD / IBS. Currently without complaint. Stools formed. CMET / CBC reviewed fro 1/2/20 was normal.  Last seen 11/2019 with a  c/o 2 weeks  on intermittent emesis ( bilious / non bloody). Mostly in morning and evening. Admitted to decreased water and food intake suspected dehydration and stress playing a role ( has required hydration at infusion center ) . EGD 11/2019 was unremarkable. Seen 10/2019 with a  48 hour complaint of lower abdominal cramping ( relieved by bowel movement) , mucoid stools and increased stool frequency ( 5-6 semi solid stools daily). Stool studies, including calprotectin  and labs including CRP / ESR were normal.  Labs from 10/11/19 reviewed and  reveal normal CRP / WBC / BMET / LFTS. Admits to increased stress an anxiety secondary to the suicide attempt of her younger cousin.  Denies tenesmus / urgency / BRBPR / fever / chills / recent antibiotics. Last seen in July when she was generally doing well with occasional urgency and fecal incontinence. Denied tenesmus / BRBPR. Stools were formed. Labs from 6/2019 were reviewed by me and were unremarkable. At 4/2019 visit was generally feeling well with occasional fecal incontinence . Seen 2/26/19 for 48 hours diarrhea. C. diff was negative. Presumed IBS / IBD flare. Lialda continued. Imodium prn recommended. Stool more formed.  Underwent fecal transplant ~ 1/25/19  at [facility]. . Initially did well with decreased diarrhea / incontinence / abdominal pain.  Has had  recurrent C. diff ( treated 5 times with Flagyl +/- Vancomycin) , fecal incontinence and IBD. Seen by Dr. Bansal for second opinion re: IBD / c. diff. For IBD ( indeterminate) mesalamine was recommended to be continued  given recent normal appearing colonoscopy. Plan is to possibly have path reviewed by [place] pathologist to exclude Behcets disease ( h/o tongue / cheek ulcers).Colonoscopy 11/2018 by me was notable for focal cryptitis / colitis in rectal and transverse colon biopsies only. EGD at the same time was unremarkable

## 2020-01-08 NOTE — ASSESSMENT
[FreeTextEntry1] : Suspect majority of sxs  related to IBS and life stress. Dehydration from inadequate water intake may also be a contributing factor. \par \par 1. IBD: Continue Lialda 2 tabs daily\par \par 2. IBS: Levsin for intermittent LLQ  pain / Digestive advantage recommended\par \par 3. C. diff: S/P fecal transplant...monitor for recurrence. Most recent stool was negative for  c. diff\par \par 4. Fecal incontinence ( occasional) :  Awaiting  manometry appointment  in [facility]  / [facility]

## 2020-01-08 NOTE — PHYSICAL EXAM
[General Appearance - Alert] : alert [Sclera] : the sclera and conjunctiva were normal [General Appearance - In No Acute Distress] : in no acute distress [Neck Appearance] : the appearance of the neck was normal [Outer Ear] : the ears and nose were normal in appearance [] : no respiratory distress [Apical Impulse] : the apical impulse was normal [Abdomen Soft] : soft [No CVA Tenderness] : no ~M costovertebral angle tenderness [Abnormal Walk] : normal gait [No Focal Deficits] : no focal deficits [Skin Color & Pigmentation] : normal skin color and pigmentation [Oriented To Time, Place, And Person] : oriented to person, place, and time [FreeTextEntry1] : deferred

## 2020-01-17 ENCOUNTER — RESULT REVIEW (OUTPATIENT)
Age: 35
End: 2020-01-17

## 2020-01-17 ENCOUNTER — APPOINTMENT (OUTPATIENT)
Dept: HEMATOLOGY ONCOLOGY | Facility: CLINIC | Age: 35
End: 2020-01-17
Payer: COMMERCIAL

## 2020-01-17 PROCEDURE — 99213 OFFICE O/P EST LOW 20 MIN: CPT

## 2020-01-17 NOTE — CONSULT LETTER
[FreeTextEntry3] : Sridhar Meneses MD, MPH\par Attending Physician\par Hematology Oncology\par [facility] [place]\par [facility]\par

## 2020-01-17 NOTE — RESULTS/DATA
[FreeTextEntry1] : Labs reviewed. \par \par 1/17/20\par WBC -5.3\par Hgb - 12.3\par Hct 38.4\par Plts 279

## 2020-01-17 NOTE — HISTORY OF PRESENT ILLNESS
[de-identified] : She is seen today for follow up. \par \par Patient with UTI since 1/6/20 with Klebsiella pneumoniae, currently on 4th day of Levaquin reporting of being more fatigue than usual. UTI associated symptoms have much improved. Patient's chronic intermittent lower quadrant from IBS stays the same. Denies fever, SOB/BASS. [de-identified] : Ms Cuevas is a very pleasant 32 year old patient with IBD here with anemia\par \par IBD/UC vs Crohns since 2011- gets flair ups - liaza 4 tab/day. Canaza supp\par Was recently on prednisone - stopped due to rash - 2-3 weeks\par \par LMP 4/5/18 - Are not heavy\par \par Every 2 months had small amount of blood in stool\par \par Feels tired and exhausted\par She is s/p fecal transplant for C Diff. \par Feels better - Has fecal incontinence - scheduled to see GI at [facility] for ARM\par \par Stool is more formed, but occasionally has diarrhea\par \par Follows with with Dr Huitron (GI) and Dr Bansal ([place])\par \par

## 2020-01-17 NOTE — ASSESSMENT
[FreeTextEntry1] : Iron deficiency \par IBD/ Premenopausal state without heavy bleeding\par Fatigue, tiredness, exhaustion- better since her diarrhea/C Diff is better with fecal implant\par Improved with IV iron\par Blood work reviewed. Ferritin WNLs\par However fatigue continues to affect her routine- ? related to inflammation related to IBD vs CTD vs fibromyalgia\par AIDEN speckled with 1:80\par \par Dehydration\par IV NS today\par \par UTI - continue with Levaquin.\par d/w Dr. Meneses\par Follow up in 12 weeks. CBC, CMP, ESR, CRP, ferritin\par \par

## 2020-01-23 ENCOUNTER — RX RENEWAL (OUTPATIENT)
Age: 35
End: 2020-01-23

## 2020-02-05 ENCOUNTER — TRANSCRIPTION ENCOUNTER (OUTPATIENT)
Age: 35
End: 2020-02-05

## 2020-02-07 ENCOUNTER — RESULT REVIEW (OUTPATIENT)
Age: 35
End: 2020-02-07

## 2020-02-07 ENCOUNTER — APPOINTMENT (OUTPATIENT)
Dept: FAMILY MEDICINE | Facility: CLINIC | Age: 35
End: 2020-02-07
Payer: COMMERCIAL

## 2020-02-07 ENCOUNTER — APPOINTMENT (OUTPATIENT)
Dept: HUMAN REPRODUCTION | Facility: CLINIC | Age: 35
End: 2020-02-07
Payer: COMMERCIAL

## 2020-02-07 VITALS
BODY MASS INDEX: 19.13 KG/M2 | RESPIRATION RATE: 17 BRPM | WEIGHT: 119 LBS | TEMPERATURE: 98.4 F | HEART RATE: 94 BPM | DIASTOLIC BLOOD PRESSURE: 70 MMHG | OXYGEN SATURATION: 99 % | SYSTOLIC BLOOD PRESSURE: 110 MMHG | HEIGHT: 66 IN

## 2020-02-07 DIAGNOSIS — Z11.3 ENCOUNTER FOR SCREENING FOR INFECTIONS WITH A PREDOMINANTLY SEXUAL MODE OF TRANSMISSION: ICD-10-CM

## 2020-02-07 DIAGNOSIS — Z01.411 ENCOUNTER FOR GYNECOLOGICAL EXAMINATION (GENERAL) (ROUTINE) WITH ABNORMAL FINDINGS: ICD-10-CM

## 2020-02-07 PROCEDURE — 99205 OFFICE O/P NEW HI 60 MIN: CPT | Mod: 25

## 2020-02-07 PROCEDURE — 76830 TRANSVAGINAL US NON-OB: CPT

## 2020-02-07 PROCEDURE — 99214 OFFICE O/P EST MOD 30 MIN: CPT

## 2020-02-07 RX ORDER — PHENAZOPYRIDINE HYDROCHLORIDE 200 MG/1
200 TABLET ORAL 3 TIMES DAILY
Qty: 21 | Refills: 1 | Status: COMPLETED | COMMUNITY
Start: 2020-01-06 | End: 2020-02-07

## 2020-02-07 RX ORDER — CETIRIZINE HYDROCHLORIDE 10 MG/1
10 TABLET, FILM COATED ORAL
Qty: 14 | Refills: 0 | Status: COMPLETED | COMMUNITY
Start: 2019-08-24

## 2020-02-07 RX ORDER — SULFAMETHOXAZOLE AND TRIMETHOPRIM 800; 160 MG/1; MG/1
800-160 TABLET ORAL
Qty: 14 | Refills: 0 | Status: COMPLETED | COMMUNITY
Start: 2019-09-16

## 2020-02-07 RX ORDER — LEVOFLOXACIN 500 MG/1
500 TABLET, FILM COATED ORAL DAILY
Qty: 7 | Refills: 0 | Status: COMPLETED | COMMUNITY
Start: 2020-01-12 | End: 2020-02-07

## 2020-02-07 RX ORDER — CIPROFLOXACIN HYDROCHLORIDE 250 MG/1
250 TABLET, FILM COATED ORAL
Qty: 20 | Refills: 0 | Status: COMPLETED | COMMUNITY
Start: 2020-01-06 | End: 2020-02-07

## 2020-02-08 PROBLEM — Z01.411 ENCOUNTER FOR GYNECOLOGICAL EXAMINATION WITH ABNORMAL FINDING: Status: RESOLVED | Noted: 2019-05-10 | Resolved: 2020-02-08

## 2020-02-08 PROBLEM — Z11.3 SCREEN FOR STD (SEXUALLY TRANSMITTED DISEASE): Status: RESOLVED | Noted: 2019-05-10 | Resolved: 2020-02-08

## 2020-02-08 NOTE — PHYSICAL EXAM
[No Acute Distress] : no acute distress [Well Nourished] : well nourished [Well Developed] : well developed [Well-Appearing] : well-appearing [No JVD] : no jugular venous distention [No Lymphadenopathy] : no lymphadenopathy [Supple] : supple [No Respiratory Distress] : no respiratory distress  [Clear to Auscultation] : lungs were clear to auscultation bilaterally [Normal Rate] : normal rate  [Regular Rhythm] : with a regular rhythm [No Carotid Bruits] : no carotid bruits [No Edema] : there was no peripheral edema [Normal Appearance] : normal in appearance [No Nipple Discharge] : no nipple discharge [No Axillary Lymphadenopathy] : no axillary lymphadenopathy [Soft] : abdomen soft [Non Tender] : non-tender [No HSM] : no HSM [Normal Axillary Nodes] : no axillary lymphadenopathy [Normal Posterior Cervical Nodes] : no posterior cervical lymphadenopathy [Normal Anterior Cervical Nodes] : no anterior cervical lymphadenopathy [No Rash] : no rash [Coordination Grossly Intact] : coordination grossly intact [No Focal Deficits] : no focal deficits [Normal Gait] : normal gait [de-identified] : Several small alvaro-areolar nodule is noted between 12:00 and 6:00-right breast. No other masses palpable.

## 2020-02-08 NOTE — HISTORY OF PRESENT ILLNESS
[FreeTextEntry1] : Right breast lump [de-identified] : Patient presents for evaluation of right breast lump discovered on recent examination by fertility consultant. Patient has not noted a mass in the area described. No local discomfort in the area. No prior history of breast masses. Patient reports lump was noted at the 9:00 position in the right breast.

## 2020-02-13 ENCOUNTER — APPOINTMENT (OUTPATIENT)
Dept: HUMAN REPRODUCTION | Facility: CLINIC | Age: 35
End: 2020-02-13
Payer: COMMERCIAL

## 2020-02-13 PROCEDURE — 99214 OFFICE O/P EST MOD 30 MIN: CPT

## 2020-02-18 LAB
25(OH)D3 SERPL-MCNC: 48.2 NG/ML
ALBUMIN SERPL ELPH-MCNC: 4.5 G/DL
ALP BLD-CCNC: 55 U/L
ALT SERPL-CCNC: 17 U/L
ANION GAP SERPL CALC-SCNC: 15 MMOL/L
APPEARANCE: ABNORMAL
APPEARANCE: CLEAR
AST SERPL-CCNC: 19 U/L
BACTERIA UR CULT: ABNORMAL
BACTERIA: ABNORMAL
BASOPHILS # BLD AUTO: 0.04 K/UL
BASOPHILS NFR BLD AUTO: 0.6 %
BILIRUB SERPL-MCNC: 0.8 MG/DL
BILIRUB UR QL STRIP: NORMAL
BILIRUBIN URINE: ABNORMAL
BILIRUBIN URINE: NEGATIVE
BLOOD URINE: NEGATIVE
BLOOD URINE: NEGATIVE
BUN SERPL-MCNC: 12 MG/DL
CALCIUM SERPL-MCNC: 9.6 MG/DL
CHLORIDE SERPL-SCNC: 104 MMOL/L
CHOLEST SERPL-MCNC: 168 MG/DL
CHOLEST/HDLC SERPL: 4 RATIO
CLARITY UR: NORMAL
CO2 SERPL-SCNC: 22 MMOL/L
COLLECTION METHOD: NORMAL
COLOR: ABNORMAL
COLOR: YELLOW
CREAT SERPL-MCNC: 0.63 MG/DL
EOSINOPHIL # BLD AUTO: 0.03 K/UL
EOSINOPHIL NFR BLD AUTO: 0.5 %
ESTIMATED AVERAGE GLUCOSE: 108 MG/DL
FERRITIN SERPL-MCNC: 344 NG/ML
FOLATE SERPL-MCNC: >20 NG/ML
GLUCOSE QUALITATIVE U: NEGATIVE
GLUCOSE QUALITATIVE U: NEGATIVE
GLUCOSE SERPL-MCNC: 89 MG/DL
GLUCOSE UR-MCNC: 100
HBA1C MFR BLD HPLC: 5.4 %
HCG UR QL: 4 EU/DL
HCT VFR BLD CALC: 43.1 %
HCV AB SER QL: NONREACTIVE
HCV S/CO RATIO: 0.13 S/CO
HDLC SERPL-MCNC: 42 MG/DL
HGB BLD-MCNC: 13.7 G/DL
HGB UR QL STRIP.AUTO: NEGATIVE
HIV1+2 AB SPEC QL IA.RAPID: NONREACTIVE
HYALINE CASTS: 0 /LPF
IMM GRANULOCYTES NFR BLD AUTO: 0.2 %
IRON SATN MFR SERPL: 45 %
IRON SERPL-MCNC: 158 UG/DL
KETONES UR-MCNC: NORMAL
KETONES URINE: ABNORMAL
KETONES URINE: NEGATIVE
LDLC SERPL CALC-MCNC: 101 MG/DL
LEUKOCYTE ESTERASE UR QL STRIP: NORMAL
LEUKOCYTE ESTERASE URINE: ABNORMAL
LEUKOCYTE ESTERASE URINE: NEGATIVE
LYMPHOCYTES # BLD AUTO: 2.36 K/UL
LYMPHOCYTES NFR BLD AUTO: 38.2 %
MAGNESIUM SERPL-MCNC: 2.3 MG/DL
MAN DIFF?: NORMAL
MCHC RBC-ENTMCNC: 31.5 PG
MCHC RBC-ENTMCNC: 31.8 GM/DL
MCV RBC AUTO: 99.1 FL
MICROSCOPIC-UA: NORMAL
MONOCYTES # BLD AUTO: 0.51 K/UL
MONOCYTES NFR BLD AUTO: 8.3 %
NEUTROPHILS # BLD AUTO: 3.23 K/UL
NEUTROPHILS NFR BLD AUTO: 52.2 %
NITRITE UR QL STRIP: POSITIVE
NITRITE URINE: NEGATIVE
NITRITE URINE: POSITIVE
PH UR STRIP: 7
PH URINE: 6.5
PH URINE: 7.5
PLATELET # BLD AUTO: 289 K/UL
POTASSIUM SERPL-SCNC: 4.3 MMOL/L
PROT SERPL-MCNC: 7.1 G/DL
PROT UR STRIP-MCNC: 100
PROTEIN URINE: NORMAL
PROTEIN URINE: NORMAL
RBC # BLD: 4.35 M/UL
RBC # FLD: 11.9 %
RED BLOOD CELLS URINE: 7 /HPF
SODIUM SERPL-SCNC: 141 MMOL/L
SP GR UR STRIP: 1.02
SPECIFIC GRAVITY URINE: 1.01
SPECIFIC GRAVITY URINE: 1.03
SQUAMOUS EPITHELIAL CELLS: 11 /HPF
TIBC SERPL-MCNC: 351 UG/DL
TRIGL SERPL-MCNC: 124 MG/DL
TSH SERPL-ACNC: 0.96 UIU/ML
UIBC SERPL-MCNC: 193 UG/DL
UROBILINOGEN URINE: NORMAL
UROBILINOGEN URINE: NORMAL
VIT B12 SERPL-MCNC: 535 PG/ML
WBC # FLD AUTO: 6.18 K/UL
WHITE BLOOD CELLS URINE: 78 /HPF

## 2020-02-19 ENCOUNTER — APPOINTMENT (OUTPATIENT)
Dept: FAMILY MEDICINE | Facility: CLINIC | Age: 35
End: 2020-02-19
Payer: COMMERCIAL

## 2020-02-19 VITALS
BODY MASS INDEX: 19.29 KG/M2 | DIASTOLIC BLOOD PRESSURE: 68 MMHG | OXYGEN SATURATION: 99 % | SYSTOLIC BLOOD PRESSURE: 112 MMHG | TEMPERATURE: 99.1 F | HEIGHT: 66 IN | HEART RATE: 97 BPM | WEIGHT: 120 LBS

## 2020-02-19 DIAGNOSIS — R19.7 DIARRHEA, UNSPECIFIED: ICD-10-CM

## 2020-02-19 DIAGNOSIS — N63.10 UNSPECIFIED LUMP IN THE RIGHT BREAST, UNSPECIFIED QUADRANT: ICD-10-CM

## 2020-02-19 PROCEDURE — 90471 IMMUNIZATION ADMIN: CPT

## 2020-02-19 PROCEDURE — 99213 OFFICE O/P EST LOW 20 MIN: CPT | Mod: 25

## 2020-02-19 PROCEDURE — 90707 MMR VACCINE SC: CPT

## 2020-02-19 RX ORDER — BROMPHENIRAMINE MALEATE, PSEUDOEPHEDRINE HYDROCHLORIDE, 2; 30; 10 MG/5ML; MG/5ML; MG/5ML
30-2-10 SYRUP ORAL
Qty: 100 | Refills: 0 | Status: COMPLETED | COMMUNITY
Start: 2020-02-03

## 2020-02-19 NOTE — PHYSICAL EXAM
[No Acute Distress] : no acute distress [Well Developed] : well developed [Well-Appearing] : well-appearing [Normal Sclera/Conjunctiva] : normal sclera/conjunctiva [No Lymphadenopathy] : no lymphadenopathy [Supple] : supple [No Respiratory Distress] : no respiratory distress  [Clear to Auscultation] : lungs were clear to auscultation bilaterally [Normal Rate] : normal rate  [Regular Rhythm] : with a regular rhythm [No Carotid Bruits] : no carotid bruits [No Edema] : there was no peripheral edema [Soft] : abdomen soft [Non Tender] : non-tender [No HSM] : no HSM [Normal Axillary Nodes] : no axillary lymphadenopathy [Normal Posterior Cervical Nodes] : no posterior cervical lymphadenopathy [Normal Anterior Cervical Nodes] : no anterior cervical lymphadenopathy [No Rash] : no rash [Speech Grossly Normal] : speech grossly normal [Alert and Oriented x3] : oriented to person, place, and time

## 2020-02-19 NOTE — HISTORY OF PRESENT ILLNESS
[FreeTextEntry1] : Equivocal mumps titer [de-identified] : Patient presents for followup of recently obtained serology titers for MMR and varicella. Tests were done in preparation for near future IVF. Mumps titer was noted to be equivocal. Also, patient underwent recent bilateral screening mammography and breast ultrasound for evaluation of possible right breast mass. Findings were consistent with fibrocystic breast disease and multiple small cysts. No other pathology was noted.

## 2020-02-24 ENCOUNTER — APPOINTMENT (OUTPATIENT)
Dept: HUMAN REPRODUCTION | Facility: CLINIC | Age: 35
End: 2020-02-24
Payer: COMMERCIAL

## 2020-02-24 PROCEDURE — 99214 OFFICE O/P EST MOD 30 MIN: CPT

## 2020-03-20 ENCOUNTER — RX RENEWAL (OUTPATIENT)
Age: 35
End: 2020-03-20

## 2020-03-31 NOTE — ASSESSMENT
[FreeTextEntry1] : Suspect majority of sxs  related to IBS and life stress. Dehydration from inadequate water intake may also be a contributing factor. \par \par 1. IBD: Continue Lialda 2 tabs daily\par \par 2. IBS: Levsin for intermittent LLQ  pain / Digestive advantage recommended\par \par 3. C. diff: S/P fecal transplant...monitor for recurrence. Most recent stool was negative for  c. diff\par \par 4. Fecal incontinence ( occasional) :  Awaiting  manometry appointment  in [facility]  / [facility]

## 2020-03-31 NOTE — HISTORY OF PRESENT ILLNESS
[de-identified] : Last seen 1/8/20  for f/u for IBD / IBS. At that visit was without and  stools formed. CMET / CBC reviewed from 1/2/20 was normal.  Seen 11/2019 with a  c/o 2 weeks  on intermittent emesis ( bilious / non bloody). Mostly in morning and evening. Admitted to decreased water and food intake suspected dehydration and stress playing a role ( has required hydration at infusion center ) . EGD 11/2019 was unremarkable. Seen 10/2019 with a  48 hour complaint of lower abdominal cramping ( relieved by bowel movement) , mucoid stools and increased stool frequency ( 5-6 semi solid stools daily). Stool studies, including calprotectin  and labs including CRP / ESR were normal.  Labs from 10/11/19 reviewed and  reveal normal CRP / WBC / BMET / LFTS. Admits to increased stress an anxiety secondary to the suicide attempt of her younger cousin.  Denies tenesmus / urgency / BRBPR / fever / chills / recent antibiotics. Last seen in July when she was generally doing well with occasional urgency and fecal incontinence. Denied tenesmus / BRBPR. Stools were formed. Labs from 6/2019 were reviewed by me and were unremarkable. At 4/2019 visit was generally feeling well with occasional fecal incontinence . Seen 2/26/19 for 48 hours diarrhea. C. diff was negative. Presumed IBS / IBD flare. Lialda continued. Imodium prn recommended. Stool more formed.  Underwent fecal transplant ~ 1/25/19  at [facility]. . Initially did well with decreased diarrhea / incontinence / abdominal pain.  Has had  recurrent C. diff ( treated 5 times with Flagyl +/- Vancomycin) , fecal incontinence and IBD. Seen by Dr. Bansal for second opinion re: IBD / c. diff. For IBD ( indeterminate) mesalamine was recommended to be continued  given recent normal appearing colonoscopy. Plan is to possibly have path reviewed by [place] pathologist to exclude Behcets disease ( h/o tongue / cheek ulcers).Colonoscopy 11/2018 by me was notable for focal cryptitis / colitis in rectal and transverse colon biopsies only. EGD at the same time was unremarkable

## 2020-04-01 ENCOUNTER — APPOINTMENT (OUTPATIENT)
Dept: GASTROENTEROLOGY | Facility: CLINIC | Age: 35
End: 2020-04-01

## 2020-04-02 ENCOUNTER — APPOINTMENT (OUTPATIENT)
Dept: GASTROENTEROLOGY | Facility: CLINIC | Age: 35
End: 2020-04-02
Payer: COMMERCIAL

## 2020-04-02 PROCEDURE — G2012 BRIEF CHECK IN BY MD/QHP: CPT

## 2020-04-03 ENCOUNTER — APPOINTMENT (OUTPATIENT)
Dept: HUMAN REPRODUCTION | Facility: CLINIC | Age: 35
End: 2020-04-03

## 2020-04-03 ENCOUNTER — RESULT REVIEW (OUTPATIENT)
Age: 35
End: 2020-04-03

## 2020-04-26 ENCOUNTER — MESSAGE (OUTPATIENT)
Age: 35
End: 2020-04-26

## 2020-05-01 ENCOUNTER — APPOINTMENT (OUTPATIENT)
Age: 35
End: 2020-05-01

## 2020-05-03 LAB
SARS-COV-2 IGG SERPL IA-ACNC: 0.2 RATIO
SARS-COV-2 IGG SERPL QL IA: NEGATIVE

## 2020-05-06 ENCOUNTER — APPOINTMENT (OUTPATIENT)
Dept: HUMAN REPRODUCTION | Facility: CLINIC | Age: 35
End: 2020-05-06
Payer: COMMERCIAL

## 2020-05-06 PROCEDURE — 99214 OFFICE O/P EST MOD 30 MIN: CPT | Mod: 95

## 2020-05-08 ENCOUNTER — APPOINTMENT (OUTPATIENT)
Dept: OBGYN | Facility: CLINIC | Age: 35
End: 2020-05-08

## 2020-05-14 ENCOUNTER — APPOINTMENT (OUTPATIENT)
Dept: HUMAN REPRODUCTION | Facility: CLINIC | Age: 35
End: 2020-05-14
Payer: COMMERCIAL

## 2020-05-14 PROCEDURE — 36415 COLL VENOUS BLD VENIPUNCTURE: CPT

## 2020-05-14 PROCEDURE — 99213 OFFICE O/P EST LOW 20 MIN: CPT | Mod: 25

## 2020-05-14 PROCEDURE — 0028H: CPT | Mod: NC

## 2020-05-14 PROCEDURE — 76830 TRANSVAGINAL US NON-OB: CPT

## 2020-05-15 ENCOUNTER — APPOINTMENT (OUTPATIENT)
Dept: OBGYN | Facility: CLINIC | Age: 35
End: 2020-05-15
Payer: COMMERCIAL

## 2020-05-15 VITALS
SYSTOLIC BLOOD PRESSURE: 110 MMHG | HEIGHT: 66 IN | BODY MASS INDEX: 18.32 KG/M2 | WEIGHT: 114 LBS | DIASTOLIC BLOOD PRESSURE: 80 MMHG

## 2020-05-15 PROCEDURE — 81003 URINALYSIS AUTO W/O SCOPE: CPT | Mod: QW

## 2020-05-15 PROCEDURE — 99395 PREV VISIT EST AGE 18-39: CPT

## 2020-05-18 ENCOUNTER — APPOINTMENT (OUTPATIENT)
Dept: HUMAN REPRODUCTION | Facility: CLINIC | Age: 35
End: 2020-05-18
Payer: COMMERCIAL

## 2020-05-18 PROCEDURE — 99213 OFFICE O/P EST LOW 20 MIN: CPT | Mod: 25

## 2020-05-18 PROCEDURE — 36415 COLL VENOUS BLD VENIPUNCTURE: CPT

## 2020-05-18 PROCEDURE — 76830 TRANSVAGINAL US NON-OB: CPT

## 2020-05-20 ENCOUNTER — APPOINTMENT (OUTPATIENT)
Dept: HUMAN REPRODUCTION | Facility: CLINIC | Age: 35
End: 2020-05-20
Payer: COMMERCIAL

## 2020-05-20 LAB
APPEARANCE: ABNORMAL
BACTERIA UR CULT: NORMAL
BACTERIA: NEGATIVE
BILIRUB UR QL STRIP: NORMAL
BILIRUBIN URINE: NEGATIVE
BLOOD URINE: ABNORMAL
C TRACH RRNA SPEC QL NAA+PROBE: NOT DETECTED
CALCIUM OXALATE CRYSTALS: ABNORMAL
COLOR: ABNORMAL
GLUCOSE QUALITATIVE U: NEGATIVE
GLUCOSE UR-MCNC: NEGATIVE
HBV SURFACE AG SER QL: NONREACTIVE
HCG UR QL: 0.2 EU/DL
HCV RNA SERPL NAA DL=5-ACNC: NOT DETECTED IU/ML
HCV RNA SERPL NAA+PROBE-LOG IU: NOT DETECTED LOG10IU/ML
HGB UR QL STRIP.AUTO: NORMAL
HIV1+2 AB SPEC QL IA.RAPID: NONREACTIVE
HPV HIGH+LOW RISK DNA PNL CVX: NOT DETECTED
KETONES UR-MCNC: NORMAL
KETONES URINE: NEGATIVE
LEUKOCYTE ESTERASE UR QL STRIP: NEGATIVE
LEUKOCYTE ESTERASE URINE: NEGATIVE
MICROSCOPIC-UA: NORMAL
N GONORRHOEA RRNA SPEC QL NAA+PROBE: NOT DETECTED
NITRITE UR QL STRIP: NEGATIVE
NITRITE URINE: NEGATIVE
PH UR STRIP: 5
PH URINE: 6
PROT UR STRIP-MCNC: 30
PROTEIN URINE: ABNORMAL
RED BLOOD CELLS URINE: >720 /HPF
SOURCE TP AMPLIFICATION: NORMAL
SP GR UR STRIP: 1.03
SPECIFIC GRAVITY URINE: >=1.03
SQUAMOUS EPITHELIAL CELLS: 3 /HPF
T PALLIDUM AB SER QL IA: NEGATIVE
UROBILINOGEN URINE: NORMAL
WHITE BLOOD CELLS URINE: 4 /HPF

## 2020-05-20 PROCEDURE — 76830 TRANSVAGINAL US NON-OB: CPT

## 2020-05-20 PROCEDURE — 36415 COLL VENOUS BLD VENIPUNCTURE: CPT

## 2020-05-20 PROCEDURE — 99213 OFFICE O/P EST LOW 20 MIN: CPT | Mod: 25

## 2020-05-22 ENCOUNTER — APPOINTMENT (OUTPATIENT)
Dept: HUMAN REPRODUCTION | Facility: CLINIC | Age: 35
End: 2020-05-22
Payer: COMMERCIAL

## 2020-05-22 PROCEDURE — 36415 COLL VENOUS BLD VENIPUNCTURE: CPT

## 2020-05-22 PROCEDURE — 99213 OFFICE O/P EST LOW 20 MIN: CPT | Mod: 25

## 2020-05-22 PROCEDURE — 76830 TRANSVAGINAL US NON-OB: CPT

## 2020-05-24 ENCOUNTER — APPOINTMENT (OUTPATIENT)
Dept: HUMAN REPRODUCTION | Facility: CLINIC | Age: 35
End: 2020-05-24
Payer: COMMERCIAL

## 2020-05-24 PROCEDURE — 36415 COLL VENOUS BLD VENIPUNCTURE: CPT

## 2020-05-24 PROCEDURE — 76830 TRANSVAGINAL US NON-OB: CPT

## 2020-05-24 PROCEDURE — 99213 OFFICE O/P EST LOW 20 MIN: CPT | Mod: 25

## 2020-05-25 LAB
C DIFF TOX GENS STL QL NAA+PROBE: NORMAL
CDIFF BY PCR: NOT DETECTED

## 2020-05-26 ENCOUNTER — APPOINTMENT (OUTPATIENT)
Dept: HUMAN REPRODUCTION | Facility: CLINIC | Age: 35
End: 2020-05-26
Payer: COMMERCIAL

## 2020-05-26 ENCOUNTER — RESULT REVIEW (OUTPATIENT)
Age: 35
End: 2020-05-26

## 2020-05-26 PROCEDURE — 76830 TRANSVAGINAL US NON-OB: CPT

## 2020-05-26 PROCEDURE — 36415 COLL VENOUS BLD VENIPUNCTURE: CPT

## 2020-05-26 PROCEDURE — 99213 OFFICE O/P EST LOW 20 MIN: CPT | Mod: 25

## 2020-05-27 ENCOUNTER — RESULT REVIEW (OUTPATIENT)
Age: 35
End: 2020-05-27

## 2020-05-28 ENCOUNTER — APPOINTMENT (OUTPATIENT)
Dept: HUMAN REPRODUCTION | Facility: CLINIC | Age: 35
End: 2020-05-28
Payer: COMMERCIAL

## 2020-05-28 PROCEDURE — 89346 STORAGE/YEAR OOCYTE(S): CPT

## 2020-05-28 PROCEDURE — 89254 OOCYTE IDENTIFICATION: CPT

## 2020-05-28 PROCEDURE — 58970 RETRIEVAL OF OOCYTE: CPT

## 2020-05-28 PROCEDURE — 89250 CULTR OOCYTE/EMBRYO <4 DAYS: CPT

## 2020-05-28 PROCEDURE — 76948 ECHO GUIDE OVA ASPIRATION: CPT

## 2020-05-28 PROCEDURE — 89337 CRYOPRESERVATION OOCYTE(S): CPT

## 2020-08-03 ENCOUNTER — APPOINTMENT (OUTPATIENT)
Dept: FAMILY MEDICINE | Facility: CLINIC | Age: 35
End: 2020-08-03
Payer: COMMERCIAL

## 2020-08-03 VITALS
WEIGHT: 113 LBS | BODY MASS INDEX: 18.16 KG/M2 | HEIGHT: 66 IN | DIASTOLIC BLOOD PRESSURE: 60 MMHG | HEART RATE: 98 BPM | RESPIRATION RATE: 17 BRPM | OXYGEN SATURATION: 99 % | SYSTOLIC BLOOD PRESSURE: 110 MMHG | TEMPERATURE: 99 F

## 2020-08-03 DIAGNOSIS — Z86.19 PERSONAL HISTORY OF OTHER INFECTIOUS AND PARASITIC DISEASES: ICD-10-CM

## 2020-08-03 DIAGNOSIS — Z86.39 PERSONAL HISTORY OF OTHER ENDOCRINE, NUTRITIONAL AND METABOLIC DISEASE: ICD-10-CM

## 2020-08-03 DIAGNOSIS — M54.12 RADICULOPATHY, CERVICAL REGION: ICD-10-CM

## 2020-08-03 PROCEDURE — 90471 IMMUNIZATION ADMIN: CPT

## 2020-08-03 PROCEDURE — 99214 OFFICE O/P EST MOD 30 MIN: CPT | Mod: 25

## 2020-08-03 PROCEDURE — 90715 TDAP VACCINE 7 YRS/> IM: CPT

## 2020-08-03 PROCEDURE — 36415 COLL VENOUS BLD VENIPUNCTURE: CPT

## 2020-08-03 RX ORDER — BENZONATATE 100 MG/1
100 CAPSULE ORAL
Qty: 30 | Refills: 1 | Status: COMPLETED | COMMUNITY
Start: 2020-02-03 | End: 2020-08-03

## 2020-08-03 RX ORDER — ONDANSETRON 4 MG/1
4 TABLET, ORALLY DISINTEGRATING ORAL EVERY 6 HOURS
Qty: 60 | Refills: 3 | Status: COMPLETED | COMMUNITY
Start: 2020-04-02 | End: 2020-08-03

## 2020-08-03 NOTE — PHYSICAL EXAM
[No Acute Distress] : no acute distress [Well Nourished] : well nourished [Well Developed] : well developed [Well-Appearing] : well-appearing [EOMI] : extraocular movements intact [Normal Sclera/Conjunctiva] : normal sclera/conjunctiva [Normal Outer Ear/Nose] : the outer ears and nose were normal in appearance [No JVD] : no jugular venous distention [Normal Oropharynx] : the oropharynx was normal [Clear to Auscultation] : lungs were clear to auscultation bilaterally [Supple] : supple [No Respiratory Distress] : no respiratory distress  [Normal Rate] : normal rate  [Regular Rhythm] : with a regular rhythm [Normal S1, S2] : normal S1 and S2 [No Carotid Bruits] : no carotid bruits [No Edema] : there was no peripheral edema [Pedal Pulses Present] : the pedal pulses are present [Non Tender] : non-tender [Non-distended] : non-distended [No Masses] : no abdominal mass palpated [Soft] : abdomen soft [Normal Bowel Sounds] : normal bowel sounds [Normal Supraclavicular Nodes] : no supraclavicular lymphadenopathy [No HSM] : no HSM [Normal Posterior Cervical Nodes] : no posterior cervical lymphadenopathy [Normal Axillary Nodes] : no axillary lymphadenopathy [Normal Inguinal Nodes] : no inguinal lymphadenopathy [Normal Anterior Cervical Nodes] : no anterior cervical lymphadenopathy [Normal Femoral Nodes] : no femoral lymphadenopathy [No Joint Swelling] : no joint swelling [No CVA Tenderness] : no CVA  tenderness [No Spinal Tenderness] : no spinal tenderness [Grossly Normal Strength/Tone] : grossly normal strength/tone [Coordination Grossly Intact] : coordination grossly intact [Normal Gait] : normal gait [Alert and Oriented x3] : oriented to person, place, and time [de-identified] : Anxious and depressed

## 2020-08-03 NOTE — REVIEW OF SYSTEMS
[Fatigue] : fatigue [Recent Change In Weight] : ~T recent weight change [Nausea] : nausea [Poor Libido] : poor libido [Headache] : headache [Insomnia] : insomnia [Depression] : depression [Anxiety] : anxiety [Fever] : no fever [Hot Flashes] : no hot flashes [Night Sweats] : no night sweats [Chills] : no chills [Discharge] : no discharge [Redness] : no redness [Pain] : no pain [Dryness] : no dryness  [Itching] : no itching [Vision Problems] : no vision problems [Earache] : no earache [Hoarseness] : no hoarseness [Hearing Loss] : no hearing loss [Nosebleed] : no nosebleeds [Postnasal Drip] : no postnasal drip [Sore Throat] : no sore throat [Nasal Discharge] : no nasal discharge [Lower Ext Edema] : no lower extremity edema [Palpitations] : no palpitations [Leg Claudication] : no leg claudication [Chest Pain] : no chest pain [Shortness Of Breath] : no shortness of breath [Paroxysmal Nocturnal Dyspnea] : no paroxysmal nocturnal dyspnea [Orthopnea] : no orthopnea [Cough] : no cough [Wheezing] : no wheezing [Dyspnea on Exertion] : no dyspnea on exertion [Abdominal Pain] : no abdominal pain [Constipation] : no constipation [Diarrhea] : diarrhea [Heartburn] : no heartburn [Dysuria] : no dysuria [Melena] : no melena [Vomiting] : no vomiting [Hematuria] : no hematuria [Nocturia] : no nocturia [Incontinence] : no incontinence [Dysmenorrhea] : no dysmenorrhea [Joint Pain] : no joint pain [Vaginal Discharge] : no vaginal discharge [Frequency] : no frequency [Joint Swelling] : no joint swelling [Joint Stiffness] : no joint stiffness [Muscle Weakness] : no muscle weakness [Back Pain] : no back pain [Muscle Pain] : no muscle pain [Nail Changes] : no nail changes [Hair Changes] : no hair changes [Mole Changes] : no mole changes [Dizziness] : no dizziness [Fainting] : no fainting [Skin Rash] : no skin rash [Confusion] : no confusion [Memory Loss] : no memory loss [Suicidal] : not suicidal [Unsteady Walking] : no ataxia [Easy Bruising] : no easy bruising [Easy Bleeding] : no easy bleeding [Swollen Glands] : no swollen glands

## 2020-08-03 NOTE — HISTORY OF PRESENT ILLNESS
[FreeTextEntry1] : Depression/anxiety x6 months [de-identified] : Patient presents for followup of recurrent depressive and anxiety symptoms for the last 6 months. Is seeing therapist regularly. 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 Feels that her partner has been "cheating on her "for 18 of the 24 months of the relationship. Has recently cut off all communications with partner. (+)feelings of helplessness/hopelessness, anergy, anhedonia, terminal insomnia, difficulty concentrating, and finding it difficult to go to work. No homicidal or suicidal thoughts. Has been on escitalopram the past with good result. Wishes to be screened for STDs. Exhibiting no symptoms.

## 2020-08-05 LAB
C TRACH RRNA SPEC QL NAA+PROBE: NOT DETECTED
HCV AB SER QL: NONREACTIVE
HCV S/CO RATIO: 0.09 S/CO
HIV1+2 AB SPEC QL IA.RAPID: NONREACTIVE
HSV 1+2 IGG SER IA-IMP: NEGATIVE
HSV 1+2 IGG SER IA-IMP: POSITIVE
HSV1 IGG SER QL: 5.43 INDEX
HSV2 IGG SER QL: 0.21 INDEX
N GONORRHOEA RRNA SPEC QL NAA+PROBE: NOT DETECTED
SOURCE AMPLIFICATION: NORMAL
T PALLIDUM AB SER QL IA: NEGATIVE

## 2020-08-07 ENCOUNTER — APPOINTMENT (OUTPATIENT)
Dept: HEMATOLOGY ONCOLOGY | Facility: CLINIC | Age: 35
End: 2020-08-07

## 2020-08-25 ENCOUNTER — APPOINTMENT (OUTPATIENT)
Dept: CARDIOLOGY | Facility: CLINIC | Age: 35
End: 2020-08-25
Payer: COMMERCIAL

## 2020-08-25 VITALS
SYSTOLIC BLOOD PRESSURE: 100 MMHG | HEIGHT: 66 IN | DIASTOLIC BLOOD PRESSURE: 70 MMHG | BODY MASS INDEX: 17.52 KG/M2 | HEART RATE: 73 BPM | WEIGHT: 109 LBS

## 2020-08-25 DIAGNOSIS — Z86.19 PERSONAL HISTORY OF OTHER INFECTIOUS AND PARASITIC DISEASES: ICD-10-CM

## 2020-08-25 DIAGNOSIS — R07.9 CHEST PAIN, UNSPECIFIED: ICD-10-CM

## 2020-08-25 DIAGNOSIS — Z87.898 PERSONAL HISTORY OF OTHER SPECIFIED CONDITIONS: ICD-10-CM

## 2020-08-25 PROCEDURE — 93306 TTE W/DOPPLER COMPLETE: CPT

## 2020-08-25 PROCEDURE — 99204 OFFICE O/P NEW MOD 45 MIN: CPT | Mod: 25

## 2020-08-25 PROCEDURE — 93000 ELECTROCARDIOGRAM COMPLETE: CPT | Mod: 59

## 2020-08-25 RX ORDER — MESALAMINE 1.2 G/1
1.2 TABLET, DELAYED RELEASE ORAL
Qty: 180 | Refills: 3 | Status: DISCONTINUED | COMMUNITY
Start: 2020-04-02 | End: 2020-08-25

## 2020-08-25 RX ORDER — MESALAMINE 1.2 G/1
1.2 TABLET, DELAYED RELEASE ORAL
Qty: 180 | Refills: 3 | Status: DISCONTINUED | COMMUNITY
Start: 2020-01-23 | End: 2020-08-25

## 2020-08-25 RX ORDER — MESALAMINE 1.2 G/1
1.2 TABLET, DELAYED RELEASE ORAL
Qty: 180 | Refills: 3 | Status: DISCONTINUED | COMMUNITY
Start: 2020-03-02 | End: 2020-08-25

## 2020-08-25 RX ORDER — HYOSCYAMINE SULFATE 0.12 MG/1
0.12 TABLET, ORALLY DISINTEGRATING ORAL
Qty: 90 | Refills: 2 | Status: DISCONTINUED | COMMUNITY
Start: 2019-10-11 | End: 2020-08-25

## 2020-08-25 RX ORDER — MESALAMINE 1.2 G/1
1.2 TABLET, DELAYED RELEASE ORAL
Qty: 180 | Refills: 2 | Status: DISCONTINUED | COMMUNITY
Start: 2019-03-06 | End: 2020-08-25

## 2020-08-25 NOTE — PHYSICAL EXAM
[General Appearance - Well Developed] : well developed [Normal Appearance] : normal appearance [Well Groomed] : well groomed [General Appearance - Well Nourished] : well nourished [No Deformities] : no deformities [General Appearance - In No Acute Distress] : no acute distress [Normal Conjunctiva] : the conjunctiva exhibited no abnormalities [Eyelids - No Xanthelasma] : the eyelids demonstrated no xanthelasmas [Normal Oral Mucosa] : normal oral mucosa [No Oral Pallor] : no oral pallor [No Oral Cyanosis] : no oral cyanosis [Normal Jugular Venous A Waves Present] : normal jugular venous A waves present [Normal Jugular Venous V Waves Present] : normal jugular venous V waves present [No Jugular Venous Rizzo A Waves] : no jugular venous rizzo A waves [Heart Rate And Rhythm] : heart rate and rhythm were normal [Heart Sounds] : normal S1 and S2 [Murmurs] : no murmurs present [Respiration, Rhythm And Depth] : normal respiratory rhythm and effort [Exaggerated Use Of Accessory Muscles For Inspiration] : no accessory muscle use [Auscultation Breath Sounds / Voice Sounds] : lungs were clear to auscultation bilaterally [Abdomen Soft] : soft [Abdomen Tenderness] : non-tender [Abdomen Mass (___ Cm)] : no abdominal mass palpated [Nail Clubbing] : no clubbing of the fingernails [Gait - Sufficient For Exercise Testing] : the gait was sufficient for exercise testing [Abnormal Walk] : normal gait [Cyanosis, Localized] : no localized cyanosis [Petechial Hemorrhages (___cm)] : no petechial hemorrhages [Skin Color & Pigmentation] : normal skin color and pigmentation [No Venous Stasis] : no venous stasis [] : no rash [No Skin Ulcers] : no skin ulcer [Skin Lesions] : no skin lesions [No Xanthoma] : no  xanthoma was observed [Oriented To Time, Place, And Person] : oriented to person, place, and time [Affect] : the affect was normal [Mood] : the mood was normal [No Anxiety] : not feeling anxious

## 2020-08-25 NOTE — DISCUSSION/SUMMARY
[FreeTextEntry1] : The EKGs in question to my reading are within normal limits. There was concern about a computer reading of atrial enlargement. The examination is normal. I performed an echocardiogram which also was normal. He believes a recent visit to the ER was do to gastrointestinal symptoms and not a cardiac process. I believe many of the patient's issues including ulcerative colitis, migraines, etc. may be related to anxiety. I thought that the patient would benefit from relaxation methods. I encouraged her to try to maintain a program of regular exercise. No further cardiac tests are required at this time.

## 2020-08-25 NOTE — REASON FOR VISIT
[FreeTextEntry1] : The patient comes for initial evaluation. She was seen in the ER about a week ago with symptoms of severe vomiting and chest discomfort. She was told of the abnormality on her EKG and was advised to consult her cardiologist. Her symptoms have subsided. The patient's main history consists of ulcerative colitis and she was also treated for C. difficile in the past. The last problem has resolved. The patient has had no known history of heart disease. Lately she has been started on treatment for mild anxiety. Patient works as a nurse at [employer].

## 2020-08-25 NOTE — HISTORY OF PRESENT ILLNESS
[FreeTextEntry1] : The patient's main medical history is gastrointestinal in nature. She is under the care of a gastroenterologist for ulcerative colitis. She also suffers from migraine headaches and anxiety. She was recently placed on an antidepressant by her primary care physician. There have been no symptoms of chest pain palpitations or shortness of breath. Patient has no history of heart disease, valvular disease, murmur, or CHF.

## 2020-10-21 ENCOUNTER — APPOINTMENT (OUTPATIENT)
Dept: FAMILY MEDICINE | Facility: CLINIC | Age: 35
End: 2020-10-21
Payer: COMMERCIAL

## 2020-10-21 VITALS
HEART RATE: 94 BPM | SYSTOLIC BLOOD PRESSURE: 100 MMHG | WEIGHT: 115 LBS | HEIGHT: 66 IN | RESPIRATION RATE: 18 BRPM | BODY MASS INDEX: 18.48 KG/M2 | TEMPERATURE: 98.5 F | DIASTOLIC BLOOD PRESSURE: 60 MMHG | OXYGEN SATURATION: 98 %

## 2020-10-21 PROCEDURE — 36415 COLL VENOUS BLD VENIPUNCTURE: CPT

## 2020-10-21 PROCEDURE — 99072 ADDL SUPL MATRL&STAF TM PHE: CPT

## 2020-10-21 PROCEDURE — 99214 OFFICE O/P EST MOD 30 MIN: CPT | Mod: 25

## 2020-10-22 LAB
BASOPHILS # BLD AUTO: 0.04 K/UL
BASOPHILS NFR BLD AUTO: 0.6 %
EOSINOPHIL # BLD AUTO: 0.18 K/UL
EOSINOPHIL NFR BLD AUTO: 2.6 %
HCT VFR BLD CALC: 48.3 %
HCV AB SER QL: NONREACTIVE
HCV S/CO RATIO: 0.08 S/CO
HGB BLD-MCNC: 15.3 G/DL
HIV1+2 AB SPEC QL IA.RAPID: NONREACTIVE
HSV 1+2 IGG SER IA-IMP: NEGATIVE
HSV 1+2 IGG SER IA-IMP: POSITIVE
HSV1 IGG SER QL: 7.39 INDEX
HSV2 IGG SER QL: 0.2 INDEX
IMM GRANULOCYTES NFR BLD AUTO: 0.3 %
LYMPHOCYTES # BLD AUTO: 2.17 K/UL
LYMPHOCYTES NFR BLD AUTO: 30.9 %
MAN DIFF?: NORMAL
MCHC RBC-ENTMCNC: 31.6 PG
MCHC RBC-ENTMCNC: 31.7 GM/DL
MCV RBC AUTO: 99.8 FL
MONOCYTES # BLD AUTO: 0.42 K/UL
MONOCYTES NFR BLD AUTO: 6 %
NEUTROPHILS # BLD AUTO: 4.2 K/UL
NEUTROPHILS NFR BLD AUTO: 59.6 %
PLATELET # BLD AUTO: 220 K/UL
RBC # BLD: 4.84 M/UL
RBC # FLD: 12.1 %
T PALLIDUM AB SER QL IA: NEGATIVE
WBC # FLD AUTO: 7.03 K/UL

## 2020-10-22 NOTE — PLAN
[FreeTextEntry1] : Treatment plan as outlined above. 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

## 2020-10-22 NOTE — PHYSICAL EXAM
[No Acute Distress] : no acute distress [Well Nourished] : well nourished [Well Developed] : well developed [Well-Appearing] : well-appearing [Normal Sclera/Conjunctiva] : normal sclera/conjunctiva [EOMI] : extraocular movements intact [Normal Outer Ear/Nose] : the outer ears and nose were normal in appearance [Normal Oropharynx] : the oropharynx was normal [No JVD] : no jugular venous distention [Supple] : supple [No Respiratory Distress] : no respiratory distress  [Clear to Auscultation] : lungs were clear to auscultation bilaterally [Normal Rate] : normal rate  [Regular Rhythm] : with a regular rhythm [Normal S1, S2] : normal S1 and S2 [No Carotid Bruits] : no carotid bruits [Pedal Pulses Present] : the pedal pulses are present [No Edema] : there was no peripheral edema [Soft] : abdomen soft [Non Tender] : non-tender [Non-distended] : non-distended [No Masses] : no abdominal mass palpated [No HSM] : no HSM [Normal Bowel Sounds] : normal bowel sounds [Normal Supraclavicular Nodes] : no supraclavicular lymphadenopathy [Normal Axillary Nodes] : no axillary lymphadenopathy [Normal Posterior Cervical Nodes] : no posterior cervical lymphadenopathy [Normal Anterior Cervical Nodes] : no anterior cervical lymphadenopathy [Normal Inguinal Nodes] : no inguinal lymphadenopathy [Normal Femoral Nodes] : no femoral lymphadenopathy [No CVA Tenderness] : no CVA  tenderness [No Spinal Tenderness] : no spinal tenderness [No Joint Swelling] : no joint swelling [Grossly Normal Strength/Tone] : grossly normal strength/tone [Coordination Grossly Intact] : coordination grossly intact [Normal Gait] : normal gait [Alert and Oriented x3] : oriented to person, place, and time [Speech Grossly Normal] : speech grossly normal [Normal Affect] : the affect was normal [Normal Mood] : the mood was normal

## 2020-10-22 NOTE — HISTORY OF PRESENT ILLNESS
[FreeTextEntry1] : Followup of chronic medical conditions, and prescription refills. [de-identified] : Patient presents for followup of chronic medical conditions-chronic ulcerative colitis, migraine headaches, major depressive disorder, generalized anxiety disorder-and prescription refills. States never started escitalopram, and depressive symptoms have resolved. Continued to have some anxiety which is well controlled with clonazepam. Migraine headaches are well controlled on present medication regimen. Overall, feels well in general. No other new symptoms. Tolerating all prescription medications. Needs followup of STD screening tests per protocol. 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 Received influenza immunization at [facility] pharmacy on 10/03/20.

## 2020-11-01 ENCOUNTER — TRANSCRIPTION ENCOUNTER (OUTPATIENT)
Age: 35
End: 2020-11-01

## 2020-11-02 LAB — CYTOLOGY CVX/VAG DOC THIN PREP: NORMAL

## 2020-11-16 ENCOUNTER — RESULT REVIEW (OUTPATIENT)
Age: 35
End: 2020-11-16

## 2020-11-16 ENCOUNTER — APPOINTMENT (OUTPATIENT)
Dept: HEMATOLOGY ONCOLOGY | Facility: CLINIC | Age: 35
End: 2020-11-16
Payer: COMMERCIAL

## 2020-11-16 ENCOUNTER — NON-APPOINTMENT (OUTPATIENT)
Age: 35
End: 2020-11-16

## 2020-11-16 VITALS
HEIGHT: 66 IN | HEART RATE: 79 BPM | WEIGHT: 115 LBS | RESPIRATION RATE: 18 BRPM | BODY MASS INDEX: 18.48 KG/M2 | OXYGEN SATURATION: 100 % | TEMPERATURE: 97.9 F | SYSTOLIC BLOOD PRESSURE: 111 MMHG | DIASTOLIC BLOOD PRESSURE: 68 MMHG

## 2020-11-16 PROCEDURE — 99072 ADDL SUPL MATRL&STAF TM PHE: CPT

## 2020-11-16 PROCEDURE — 99215 OFFICE O/P EST HI 40 MIN: CPT

## 2020-11-16 NOTE — ASSESSMENT
[FreeTextEntry1] : # Iron deficiency \par IBD/ Premenopausal state without heavy bleeding\par Fatigue, tiredness, exhaustion- better since her diarrhea/C Diff is better with fecal implant\par Improved with IV iron\par Blood work reviewed. Ferritin WNLs\par However fatigue continues to affect her routine- ? related to inflammation related to IBD vs CTD vs fibromyalgia\par AIDEN speckled with 1:80\par \par # R breast mass\par Recommend R Mammo/US  \par was on fertility treatments \par \par #UC - mesalamine\par N/V -IV NS today\par \par RTC in 12 weeks  in CBC, CMP, ESR, CRP, iron, ferritin

## 2020-11-16 NOTE — CONSULT LETTER
[Dear  ___] : Dear  [unfilled], [Consult Letter:] : I had the pleasure of evaluating your patient, [unfilled]. [Please see my note below.] : Please see my note below. [Consult Closing:] : Thank you very much for allowing me to participate in the care of this patient.  If you have any questions, please do not hesitate to contact me. [Sincerely,] : Sincerely, [FreeTextEntry3] : Sridhar Meneses MD, MPH\par Attending Physician\par Hematology Oncology\par [facility] [place]\par [facility]\par  [DrAdams  ___] : Dr. YOUNG [DrAdams ___] : Dr. YOUNG

## 2020-11-16 NOTE — HISTORY OF PRESENT ILLNESS
[de-identified] : Ms Cuevas is a very pleasant 32 year old patient with IBD here with anemia\par \par IBD/UC vs Crohns since 2011- gets flair ups - liaza 4 tab/day. Canaza supp\par Was recently on prednisone - stopped due to rash - 2-3 weeks\par \par LMP 4/5/18 - Are not heavy\par \par Every 2 months had small amount of blood in stool\par \par Feels tired and exhausted\par She is s/p fecal transplant for C Diff. \par Feels better - Has fecal incontinence - scheduled to see GI at [facility] for ARM\par \par Stool is more formed, but occasionally has diarrhea\par \par Follows with with Dr Huitron (GI) and Dr Bansal ([place])\par \par  [de-identified] : Patient seen and examined and here today for follow up\par palpated a beast mass at 12:00 location on R\par No discharge or discoloration o pain

## 2020-11-18 ENCOUNTER — RESULT REVIEW (OUTPATIENT)
Age: 35
End: 2020-11-18

## 2020-11-30 ENCOUNTER — NON-APPOINTMENT (OUTPATIENT)
Age: 35
End: 2020-11-30

## 2020-12-04 ENCOUNTER — APPOINTMENT (OUTPATIENT)
Dept: FAMILY MEDICINE | Facility: CLINIC | Age: 35
End: 2020-12-04
Payer: COMMERCIAL

## 2020-12-04 VITALS
HEART RATE: 81 BPM | RESPIRATION RATE: 18 BRPM | DIASTOLIC BLOOD PRESSURE: 60 MMHG | OXYGEN SATURATION: 98 % | BODY MASS INDEX: 18.64 KG/M2 | HEIGHT: 66 IN | WEIGHT: 116 LBS | TEMPERATURE: 98.2 F | SYSTOLIC BLOOD PRESSURE: 110 MMHG

## 2020-12-04 DIAGNOSIS — B37.3 CANDIDIASIS OF VULVA AND VAGINA: ICD-10-CM

## 2020-12-04 PROCEDURE — 99214 OFFICE O/P EST MOD 30 MIN: CPT | Mod: 25

## 2020-12-04 PROCEDURE — 99072 ADDL SUPL MATRL&STAF TM PHE: CPT

## 2020-12-04 PROCEDURE — 81003 URINALYSIS AUTO W/O SCOPE: CPT | Mod: QW

## 2020-12-05 LAB
APPEARANCE: CLEAR
BACTERIA: NEGATIVE
BILIRUB UR QL STRIP: NORMAL
BILIRUBIN URINE: NEGATIVE
BLOOD URINE: NEGATIVE
C TRACH RRNA SPEC QL NAA+PROBE: NOT DETECTED
CLARITY UR: CLEAR
COLLECTION METHOD: NORMAL
COLOR: YELLOW
GLUCOSE QUALITATIVE U: NEGATIVE
GLUCOSE UR-MCNC: NORMAL
HCG UR QL: 0.2 EU/DL
HGB UR QL STRIP.AUTO: NORMAL
HYALINE CASTS: 2 /LPF
KETONES UR-MCNC: NORMAL
KETONES URINE: NEGATIVE
LEUKOCYTE ESTERASE UR QL STRIP: NORMAL
LEUKOCYTE ESTERASE URINE: ABNORMAL
MICROSCOPIC-UA: NORMAL
N GONORRHOEA RRNA SPEC QL NAA+PROBE: NOT DETECTED
NITRITE UR QL STRIP: NORMAL
NITRITE URINE: NEGATIVE
PH UR STRIP: 5.5
PH URINE: 6
PROT UR STRIP-MCNC: NORMAL
PROTEIN URINE: NORMAL
RED BLOOD CELLS URINE: 7 /HPF
SOURCE AMPLIFICATION: NORMAL
SP GR UR STRIP: 1.02
SPECIFIC GRAVITY URINE: 1.02
SQUAMOUS EPITHELIAL CELLS: 5 /HPF
UROBILINOGEN URINE: NORMAL
WHITE BLOOD CELLS URINE: 11 /HPF

## 2020-12-05 NOTE — PHYSICAL EXAM
[No Acute Distress] : no acute distress [Well Nourished] : well nourished [Well Developed] : well developed [Well-Appearing] : well-appearing [Normal Voice/Communication] : normal voice/communication [No JVD] : no jugular venous distention [Supple] : supple [No Respiratory Distress] : no respiratory distress  [Clear to Auscultation] : lungs were clear to auscultation bilaterally [Normal Rate] : normal rate  [Regular Rhythm] : with a regular rhythm [Normal S1, S2] : normal S1 and S2 [Soft] : abdomen soft [Non Tender] : non-tender [Non-distended] : non-distended [No Masses] : no abdominal mass palpated [Normal Bowel Sounds] : normal bowel sounds [Normal Axillary Nodes] : no axillary lymphadenopathy [Normal Posterior Cervical Nodes] : no posterior cervical lymphadenopathy [Normal Anterior Cervical Nodes] : no anterior cervical lymphadenopathy [No CVA Tenderness] : no CVA  tenderness [No Spinal Tenderness] : no spinal tenderness [No Rash] : no rash

## 2020-12-05 NOTE — REVIEW OF SYSTEMS
[Frequency] : frequency [Negative] : Heme/Lymph [Fever] : no fever [Chills] : no chills [Fatigue] : no fatigue [Recent Change In Weight] : ~T no recent weight change [Abdominal Pain] : no abdominal pain [Nausea] : no nausea [Vomiting] : no vomiting [Dysuria] : no dysuria [Incontinence] : no incontinence [Nocturia] : no nocturia [Hematuria] : no hematuria [Vaginal Discharge] : no vaginal discharge [Back Pain] : no back pain [Itching] : no itching [Skin Rash] : no skin rash [Swollen Glands] : no swollen glands

## 2020-12-05 NOTE — HISTORY OF PRESENT ILLNESS
[FreeTextEntry1] : Followup of ED visit. [de-identified] : Patient presents for followup of [facility] ED visit-11/28/20-for acute cystitis with hematuria. Treated with IV fluids, IV Tylenol and single dose of IV antibiotics. Discharged home on cephalexin 500 mg q6 hrs x7 days. Reports feeling much improved, but continues to have mild dysuria. No fever, chills, abdominal or flank pain. Tolerating oral antibiotic. No other new symptoms. Requesting medication to prevent monilial vaginitis from oral antibiotics.

## 2020-12-18 ENCOUNTER — APPOINTMENT (OUTPATIENT)
Dept: OBGYN | Facility: CLINIC | Age: 35
End: 2020-12-18
Payer: COMMERCIAL

## 2020-12-18 VITALS
DIASTOLIC BLOOD PRESSURE: 74 MMHG | BODY MASS INDEX: 18.64 KG/M2 | HEIGHT: 66 IN | WEIGHT: 116 LBS | SYSTOLIC BLOOD PRESSURE: 124 MMHG

## 2020-12-18 DIAGNOSIS — N93.9 ABNORMAL UTERINE AND VAGINAL BLEEDING, UNSPECIFIED: ICD-10-CM

## 2020-12-18 PROCEDURE — 81025 URINE PREGNANCY TEST: CPT

## 2020-12-18 PROCEDURE — 99072 ADDL SUPL MATRL&STAF TM PHE: CPT

## 2020-12-18 PROCEDURE — 99213 OFFICE O/P EST LOW 20 MIN: CPT

## 2020-12-18 RX ORDER — PHENAZOPYRIDINE HYDROCHLORIDE 200 MG/1
200 TABLET ORAL 3 TIMES DAILY
Qty: 21 | Refills: 0 | Status: DISCONTINUED | COMMUNITY
Start: 2020-12-04 | End: 2020-12-18

## 2020-12-18 RX ORDER — FLUCONAZOLE 150 MG/1
150 TABLET ORAL
Qty: 2 | Refills: 0 | Status: DISCONTINUED | COMMUNITY
Start: 2020-12-04 | End: 2020-12-18

## 2020-12-18 RX ORDER — FAMOTIDINE 20 MG/1
20 TABLET, FILM COATED ORAL
Qty: 60 | Refills: 6 | Status: DISCONTINUED | COMMUNITY
Start: 2019-10-11 | End: 2020-12-18

## 2020-12-21 LAB
N GONORRHOEA RRNA SPEC QL NAA+PROBE: NOT DETECTED
SOURCE AMPLIFICATION: NORMAL

## 2020-12-23 ENCOUNTER — APPOINTMENT (OUTPATIENT)
Dept: FAMILY MEDICINE | Facility: CLINIC | Age: 35
End: 2020-12-23
Payer: COMMERCIAL

## 2020-12-23 ENCOUNTER — RESULT REVIEW (OUTPATIENT)
Age: 35
End: 2020-12-23

## 2020-12-23 VITALS
HEART RATE: 80 BPM | WEIGHT: 118 LBS | TEMPERATURE: 98.2 F | SYSTOLIC BLOOD PRESSURE: 118 MMHG | HEIGHT: 66 IN | RESPIRATION RATE: 17 BRPM | DIASTOLIC BLOOD PRESSURE: 70 MMHG | OXYGEN SATURATION: 99 % | BODY MASS INDEX: 18.96 KG/M2

## 2020-12-23 PROCEDURE — 99203 OFFICE O/P NEW LOW 30 MIN: CPT

## 2020-12-23 PROCEDURE — 99213 OFFICE O/P EST LOW 20 MIN: CPT

## 2020-12-23 PROCEDURE — 99072 ADDL SUPL MATRL&STAF TM PHE: CPT

## 2020-12-24 LAB — BACTERIA UR CULT: NORMAL

## 2020-12-27 NOTE — PHYSICAL EXAM
[No Acute Distress] : no acute distress [Well Nourished] : well nourished [Well Developed] : well developed [Well-Appearing] : well-appearing [Normal Voice/Communication] : normal voice/communication [No Respiratory Distress] : no respiratory distress  [Clear to Auscultation] : lungs were clear to auscultation bilaterally [Normal Rate] : normal rate  [Regular Rhythm] : with a regular rhythm [Normal Axillary Nodes] : no axillary lymphadenopathy [Normal Posterior Cervical Nodes] : no posterior cervical lymphadenopathy [Normal Anterior Cervical Nodes] : no anterior cervical lymphadenopathy [No Rash] : no rash [No Skin Lesions] : no skin lesions [Coordination Grossly Intact] : coordination grossly intact [No Focal Deficits] : no focal deficits [Normal Gait] : normal gait [de-identified] : Left hand-distal 2nd/3rd dorsal metacarpal bone tenderness without discoloration or swelling, no motor, sensory, or vascular deficits noted [de-identified] : Intact

## 2020-12-27 NOTE — REVIEW OF SYSTEMS
[Joint Pain] : joint pain [Joint Stiffness] : joint stiffness [Joint Swelling] : joint swelling [Negative] : Heme/Lymph [Muscle Weakness] : no muscle weakness [Muscle Pain] : no muscle pain [Back Pain] : no back pain

## 2020-12-27 NOTE — HISTORY OF PRESENT ILLNESS
[FreeTextEntry1] : Workers' Compensation-Left hand trauma [de-identified] : Patient presents for work related injury of 12/21/20, to left hand. 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 Patient reports immediate pain and swelling. Has been able to work since the injury, but reports continued discomfort. The swelling has resolved significantly. No discoloration or dysfunction reported.

## 2020-12-27 NOTE — PLAN
[FreeTextEntry1] : Treatment plan as outlined above. Patient provided with off work order for today-12/23/20.

## 2021-01-04 ENCOUNTER — LABORATORY RESULT (OUTPATIENT)
Age: 36
End: 2021-01-04

## 2021-01-04 ENCOUNTER — NON-APPOINTMENT (OUTPATIENT)
Age: 36
End: 2021-01-04

## 2021-01-04 ENCOUNTER — APPOINTMENT (OUTPATIENT)
Dept: FAMILY MEDICINE | Facility: CLINIC | Age: 36
End: 2021-01-04
Payer: OTHER MISCELLANEOUS

## 2021-01-04 VITALS
HEIGHT: 66 IN | SYSTOLIC BLOOD PRESSURE: 110 MMHG | TEMPERATURE: 98.4 F | BODY MASS INDEX: 18.96 KG/M2 | WEIGHT: 118 LBS | HEART RATE: 81 BPM | DIASTOLIC BLOOD PRESSURE: 62 MMHG | RESPIRATION RATE: 18 BRPM | OXYGEN SATURATION: 99 %

## 2021-01-04 DIAGNOSIS — Z11.3 ENCOUNTER FOR SCREENING FOR INFECTIONS WITH A PREDOMINANTLY SEXUAL MODE OF TRANSMISSION: ICD-10-CM

## 2021-01-04 PROCEDURE — 93000 ELECTROCARDIOGRAM COMPLETE: CPT

## 2021-01-04 PROCEDURE — 99395 PREV VISIT EST AGE 18-39: CPT | Mod: 25

## 2021-01-04 PROCEDURE — 99173 VISUAL ACUITY SCREEN: CPT

## 2021-01-04 PROCEDURE — 99072 ADDL SUPL MATRL&STAF TM PHE: CPT

## 2021-01-04 PROCEDURE — 36415 COLL VENOUS BLD VENIPUNCTURE: CPT

## 2021-01-04 PROCEDURE — 92552 PURE TONE AUDIOMETRY AIR: CPT

## 2021-01-04 NOTE — HEALTH RISK ASSESSMENT
[No falls in past year] : Patient reported no falls in the past year [0] : 2) Feeling down, depressed, or hopeless: Not at all (0) [Patient reported mammogram was normal] : Patient reported mammogram was normal [Patient reported PAP Smear was normal] : Patient reported PAP Smear was normal [HIV Test offered] : HIV Test offered [Very Good] : ~his/her~ current health as very good [Excellent] : ~his/her~  mood as  excellent [Never (0 pts)] : Never (0 points) [No] : In the past 12 months have you used drugs other than those required for medical reasons? No [No Retinopathy] : No retinopathy [Patient reported colonoscopy was abnormal] : Patient reported colonoscopy was abnormal [Hepatitis C test offered] : Hepatitis C test offered [None] : None [Alone] : lives alone [# of Members in Household ___] :  household currently consist of [unfilled] member(s) [Employed] : employed [College] : College [] :  [# Of Children ___] : has [unfilled] children [Sexually Active] : sexually active [Feels Safe at Home] : Feels safe at home [Fully functional (bathing, dressing, toileting, transferring, walking, feeding)] : Fully functional (bathing, dressing, toileting, transferring, walking, feeding) [Fully functional (using the telephone, shopping, preparing meals, housekeeping, doing laundry, using] : Fully functional and needs no help or supervision to perform IADLs (using the telephone, shopping, preparing meals, housekeeping, doing laundry, using transportation, managing medications and managing finances) [Reports normal functional visual acuity (ie: able to read med bottle)] : Reports normal functional visual acuity [Smoke Detector] : smoke detector [Carbon Monoxide Detector] : carbon monoxide detector [Safety elements used in home] : safety elements used in home [Seat Belt] :  uses seat belt [Sunscreen] : uses sunscreen [] : No [Audit-CScore] : 0 [de-identified] : Gym, walking, yoga, free weights, stationary bicycle [[id number]] : 0 [EyeExamDate] : 01/04/20 [Change in mental status noted] : No change in mental status noted [Language] : denies difficulty with language [Behavior] : denies difficulty with behavior [Learning/Retaining New Information] : denies difficulty learning/retaining new information [Handling Complex Tasks] : denies difficulty handling complex tasks [Reasoning] : denies difficulty with reasoning [Spatial Ability and Orientation] : denies difficulty with spatial ability and orientation [High Risk Behavior] : no high risk behavior [Reports changes in hearing] : Reports no changes in hearing [Reports changes in vision] : Reports no changes in vision [Reports changes in dental health] : Reports no changes in dental health [Guns at Home] : no guns at home [Travel to Developing Areas] : does not  travel to developing areas [TB Exposure] : is not being exposed to tuberculosis [MammogramDate] : 12/19 [PapSmearDate] : 05/19 [BoneDensityComments] : NA [ColonoscopyDate] : 11/18 [de-identified] : 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

## 2021-01-04 NOTE — HISTORY OF PRESENT ILLNESS
[FreeTextEntry1] : Complete Physical Examination [de-identified] : Presents for Complete physical Examination. Feels well in general. No new symptoms. Tolerating all prescription medications. Nonsmoker. Social alcohol intake only. Immunizations are up-to-date. All preventative services were reviewed in detail and appear to be current for age.

## 2021-01-04 NOTE — PHYSICAL EXAM
[No Acute Distress] : no acute distress [Well Nourished] : well nourished [Well Developed] : well developed [Well-Appearing] : well-appearing [Normal Sclera/Conjunctiva] : normal sclera/conjunctiva [PERRL] : pupils equal round and reactive to light [EOMI] : extraocular movements intact [Normal Outer Ear/Nose] : the outer ears and nose were normal in appearance [Normal Oropharynx] : the oropharynx was normal [No JVD] : no jugular venous distention [No Lymphadenopathy] : no lymphadenopathy [Supple] : supple [Thyroid Normal, No Nodules] : the thyroid was normal and there were no nodules present [No Respiratory Distress] : no respiratory distress  [No Accessory Muscle Use] : no accessory muscle use [Clear to Auscultation] : lungs were clear to auscultation bilaterally [Normal Rate] : normal rate  [Regular Rhythm] : with a regular rhythm [Normal S1, S2] : normal S1 and S2 [No Murmur] : no murmur heard [No Carotid Bruits] : no carotid bruits [No Abdominal Bruit] : a ~M bruit was not heard ~T in the abdomen [No Varicosities] : no varicosities [Pedal Pulses Present] : the pedal pulses are present [No Edema] : there was no peripheral edema [No Palpable Aorta] : no palpable aorta [No Extremity Clubbing/Cyanosis] : no extremity clubbing/cyanosis [Soft] : abdomen soft [Non Tender] : non-tender [Non-distended] : non-distended [No HSM] : no HSM [Normal Bowel Sounds] : normal bowel sounds [Normal Posterior Cervical Nodes] : no posterior cervical lymphadenopathy [Normal Anterior Cervical Nodes] : no anterior cervical lymphadenopathy [No CVA Tenderness] : no CVA  tenderness [No Spinal Tenderness] : no spinal tenderness [No Joint Swelling] : no joint swelling [Grossly Normal Strength/Tone] : grossly normal strength/tone [No Rash] : no rash [Coordination Grossly Intact] : coordination grossly intact [No Focal Deficits] : no focal deficits [Normal Gait] : normal gait [Normal Affect] : the affect was normal [Normal Insight/Judgement] : insight and judgment were intact [Normal Voice/Communication] : normal voice/communication [Fundoscopic Exam Performed] : fundoscopic ~T exam ~C was performed [20/___] : left eye 20/[unfilled] [Snellen] : acuity screening with Snellen chart [Normal TMs] : both tympanic membranes were normal [Normal Nasal Mucosa] : the nasal mucosa was normal [Normal Percussion] : the chest was normal to percussion [Normal Appearance] : normal in appearance [No Masses] : no palpable masses [No Nipple Discharge] : no nipple discharge [No Axillary Lymphadenopathy] : no axillary lymphadenopathy [No Hernias] : no hernias [External Female Genitalia] : normal external genitalia [Normal Supraclavicular Nodes] : no supraclavicular lymphadenopathy [Normal Axillary Nodes] : no axillary lymphadenopathy [Normal Inguinal Nodes] : no inguinal lymphadenopathy [Normal Femoral Nodes] : no femoral lymphadenopathy [No Skin Lesions] : no skin lesions [Deep Tendon Reflexes (DTR)] : deep tendon reflexes were 2+ and symmetric [Speech Grossly Normal] : speech grossly normal [Memory Grossly Normal] : memory grossly normal [Alert and Oriented x3] : oriented to person, place, and time [Normal Mood] : the mood was normal [Kyphosis] : no kyphosis [Scoliosis] : no scoliosis [Acne] : no acne [FreeTextEntry1] : Deferred

## 2021-01-04 NOTE — REVIEW OF SYSTEMS
[Negative] : Heme/Lymph [Fever] : no fever [Chills] : no chills [Fatigue] : no fatigue [Hot Flashes] : no hot flashes [Night Sweats] : no night sweats [Recent Change In Weight] : ~T no recent weight change [Discharge] : no discharge [Pain] : no pain [Redness] : no redness [Dryness] : no dryness  [Vision Problems] : no vision problems [Itching] : no itching [Earache] : no earache [Hearing Loss] : no hearing loss [Nosebleed] : no nosebleeds [Hoarseness] : no hoarseness [Nasal Discharge] : no nasal discharge [Sore Throat] : no sore throat [Postnasal Drip] : no postnasal drip [Chest Pain] : no chest pain [Palpitations] : no palpitations [Leg Claudication] : no leg claudication [Lower Ext Edema] : no lower extremity edema [Orthopnea] : no orthopnea [Paroxysmal Nocturnal Dyspnea] : no paroxysmal nocturnal dyspnea [Shortness Of Breath] : no shortness of breath [Wheezing] : no wheezing [Cough] : no cough [Dyspnea on Exertion] : no dyspnea on exertion [Abdominal Pain] : no abdominal pain [Nausea] : no nausea [Constipation] : no constipation [Diarrhea] : diarrhea [Vomiting] : no vomiting [Heartburn] : no heartburn [Melena] : no melena [Dysuria] : no dysuria [Incontinence] : no incontinence [Nocturia] : no nocturia [Hematuria] : no hematuria [Frequency] : no frequency [Vaginal Discharge] : no vaginal discharge [Joint Pain] : no joint pain [Joint Stiffness] : no joint stiffness [Joint Swelling] : no joint swelling [Muscle Weakness] : no muscle weakness [Muscle Pain] : no muscle pain [Back Pain] : no back pain [Itching] : no itching [Mole Changes] : no mole changes [Nail Changes] : no nail changes [Hair Changes] : no hair changes [Skin Rash] : no skin rash [Headache] : no headache [Dizziness] : no dizziness [Fainting] : no fainting [Confusion] : no confusion [Memory Loss] : no memory loss [Unsteady Walking] : no ataxia [Suicidal] : not suicidal [Insomnia] : no insomnia [Anxiety] : no anxiety [Depression] : no depression [Easy Bleeding] : no easy bleeding [Easy Bruising] : no easy bruising [Swollen Glands] : no swollen glands

## 2021-01-06 ENCOUNTER — APPOINTMENT (OUTPATIENT)
Dept: FAMILY MEDICINE | Facility: CLINIC | Age: 36
End: 2021-01-06
Payer: OTHER MISCELLANEOUS

## 2021-01-06 VITALS
DIASTOLIC BLOOD PRESSURE: 70 MMHG | HEIGHT: 66 IN | TEMPERATURE: 98.7 F | RESPIRATION RATE: 18 BRPM | OXYGEN SATURATION: 99 % | WEIGHT: 118 LBS | SYSTOLIC BLOOD PRESSURE: 110 MMHG | HEART RATE: 64 BPM | BODY MASS INDEX: 18.96 KG/M2

## 2021-01-06 LAB
25(OH)D3 SERPL-MCNC: 48.5 NG/ML
ALBUMIN SERPL ELPH-MCNC: 4.8 G/DL
ALP BLD-CCNC: 61 U/L
ALT SERPL-CCNC: 16 U/L
ANION GAP SERPL CALC-SCNC: 14 MMOL/L
APPEARANCE: ABNORMAL
AST SERPL-CCNC: 21 U/L
BASOPHILS # BLD AUTO: 0.04 K/UL
BASOPHILS NFR BLD AUTO: 0.6 %
BILIRUB SERPL-MCNC: 0.5 MG/DL
BILIRUBIN URINE: NEGATIVE
BLOOD URINE: NEGATIVE
BUN SERPL-MCNC: 9 MG/DL
CALCIUM SERPL-MCNC: 10.3 MG/DL
CHLORIDE SERPL-SCNC: 101 MMOL/L
CHOLEST SERPL-MCNC: 188 MG/DL
CO2 SERPL-SCNC: 24 MMOL/L
COLOR: YELLOW
CREAT SERPL-MCNC: 0.86 MG/DL
EOSINOPHIL # BLD AUTO: 0.03 K/UL
EOSINOPHIL NFR BLD AUTO: 0.4 %
ESTIMATED AVERAGE GLUCOSE: 105 MG/DL
FOLATE SERPL-MCNC: >20 NG/ML
GLUCOSE QUALITATIVE U: NEGATIVE
GLUCOSE SERPL-MCNC: 89 MG/DL
HBA1C MFR BLD HPLC: 5.3 %
HCT VFR BLD CALC: 44.7 %
HCV AB SER QL: NONREACTIVE
HCV S/CO RATIO: 0.08 S/CO
HDLC SERPL-MCNC: 49 MG/DL
HGB BLD-MCNC: 13.7 G/DL
HIV1+2 AB SPEC QL IA.RAPID: NONREACTIVE
HSV 1+2 IGG SER IA-IMP: NEGATIVE
HSV 1+2 IGG SER IA-IMP: POSITIVE
HSV1 IGG SER QL: 3.89 INDEX
HSV2 IGG SER QL: 0.21 INDEX
IMM GRANULOCYTES NFR BLD AUTO: 0.1 %
KETONES URINE: NEGATIVE
LDLC SERPL CALC-MCNC: 115 MG/DL
LEUKOCYTE ESTERASE URINE: ABNORMAL
LYMPHOCYTES # BLD AUTO: 2.25 K/UL
LYMPHOCYTES NFR BLD AUTO: 31.7 %
MAGNESIUM SERPL-MCNC: 2.3 MG/DL
MAN DIFF?: NORMAL
MCHC RBC-ENTMCNC: 30.6 GM/DL
MCHC RBC-ENTMCNC: 31 PG
MCV RBC AUTO: 101.1 FL
MONOCYTES # BLD AUTO: 0.55 K/UL
MONOCYTES NFR BLD AUTO: 7.8 %
NEUTROPHILS # BLD AUTO: 4.21 K/UL
NEUTROPHILS NFR BLD AUTO: 59.4 %
NITRITE URINE: NEGATIVE
NONHDLC SERPL-MCNC: 139 MG/DL
PH URINE: 6.5
PLATELET # BLD AUTO: 303 K/UL
POTASSIUM SERPL-SCNC: 4.2 MMOL/L
PROT SERPL-MCNC: 7.4 G/DL
PROTEIN URINE: NEGATIVE
RBC # BLD: 4.42 M/UL
RBC # FLD: 12.3 %
SODIUM SERPL-SCNC: 139 MMOL/L
SPECIFIC GRAVITY URINE: 1.01
T PALLIDUM AB SER QL IA: NEGATIVE
TRIGL SERPL-MCNC: 122 MG/DL
TSH SERPL-ACNC: 1.04 UIU/ML
UROBILINOGEN URINE: NORMAL
VIT B12 SERPL-MCNC: 441 PG/ML
WBC # FLD AUTO: 7.09 K/UL

## 2021-01-06 PROCEDURE — 99213 OFFICE O/P EST LOW 20 MIN: CPT

## 2021-01-06 PROCEDURE — 99072 ADDL SUPL MATRL&STAF TM PHE: CPT

## 2021-01-07 LAB
C TRACH RRNA SPEC QL NAA+PROBE: NOT DETECTED
N GONORRHOEA RRNA SPEC QL NAA+PROBE: NOT DETECTED
SOURCE AMPLIFICATION: NORMAL

## 2021-01-12 ENCOUNTER — APPOINTMENT (OUTPATIENT)
Dept: HUMAN REPRODUCTION | Facility: CLINIC | Age: 36
End: 2021-01-12
Payer: COMMERCIAL

## 2021-01-12 PROCEDURE — 99215 OFFICE O/P EST HI 40 MIN: CPT | Mod: 95

## 2021-01-14 NOTE — HISTORY OF PRESENT ILLNESS
[FreeTextEntry1] : Workers' Compensation followup [de-identified] : Patient presents for followup of Workers' Compensation case from injury sustained on 12/21/20. 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 Patient reported immediate pain and swelling. Treated with elevation and ice. Evaluated in the office on 12/23/20. Had a left hand x-ray performed which was negative. Patient reported no lost time from work, and has continued to work to present. Reports hand is fully functional and without discomfort.

## 2021-01-14 NOTE — PHYSICAL EXAM
[No Acute Distress] : no acute distress [Well Nourished] : well nourished [Well Developed] : well developed [Well-Appearing] : well-appearing [Normal Voice/Communication] : normal voice/communication [No Respiratory Distress] : no respiratory distress  [Clear to Auscultation] : lungs were clear to auscultation bilaterally [Normal Rate] : normal rate  [Regular Rhythm] : with a regular rhythm [Normal S1, S2] : normal S1 and S2 [Normal Axillary Nodes] : no axillary lymphadenopathy [Normal Posterior Cervical Nodes] : no posterior cervical lymphadenopathy [Normal Anterior Cervical Nodes] : no anterior cervical lymphadenopathy [Coordination Grossly Intact] : coordination grossly intact [No Rash] : no rash [No Focal Deficits] : no focal deficits [de-identified] : Left hand-distal 2nd/3rd dorsal metatarsal bones without tenderness discoloration or swelling, no motor, sensory, or vascular deficits noted.

## 2021-01-27 NOTE — PHYSICAL EXAM
[General Appearance - Alert] : alert [No Focal Deficits] : no focal deficits [Oriented To Time, Place, And Person] : oriented to person, place, and time

## 2021-01-28 ENCOUNTER — APPOINTMENT (OUTPATIENT)
Dept: GASTROENTEROLOGY | Facility: CLINIC | Age: 36
End: 2021-01-28
Payer: COMMERCIAL

## 2021-01-28 DIAGNOSIS — K58.9 IRRITABLE BOWEL SYNDROME W/OUT DIARRHEA: ICD-10-CM

## 2021-01-28 DIAGNOSIS — R12 HEARTBURN: ICD-10-CM

## 2021-01-28 PROCEDURE — 99215 OFFICE O/P EST HI 40 MIN: CPT | Mod: 95

## 2021-01-28 NOTE — HISTORY OF PRESENT ILLNESS
[Medical Office: ([facility])___] : at the medical office located in  [Home] : at home, [unfilled] , at the time of the visit. [de-identified] : Follow up for IBD / IBS / GERD.  Generally doing well, with formed bowel movements. Infrequent urgency / no BRBPR.\par Labs reviewed from 1/2021 was notable for a normal H/H and CMET ( no ESR / CRP drawn) . Last encounter was 4/2020 at which point she was doing well.  CMET / CBC reviewed from 1/2/20 was normal.  \par \par Seen 11/2019 with a  c/o 2 weeks  on intermittent emesis ( bilious / non bloody). Mostly in morning and evening. Admitted to decreased water and food intake suspected dehydration and stress playing a role ( has required hydration at infusion center ) .\par \par  EGD 11/2019 was unremarkable. \par \par  Underwent fecal transplant ~ 1/25/19  at [facility]. . Initially did well with decreased diarrhea / incontinence / abdominal pain.  Has had  recurrent C. diff ( treated 5 times with Flagyl +/- Vancomycin) , fecal incontinence and IBD. Seen by Dr. Bansal for second opinion re: IBD / c. diff. For IBD ( indeterminate) mesalamine was recommended to be continued  given recent normal appearing colonoscopy.\par \par Colonoscopy 11/2018 by me was notable for focal cryptitis / colitis in rectal and transverse colon biopsies only. EGD at the same time was unremarkable

## 2021-01-28 NOTE — ASSESSMENT
[FreeTextEntry1] : 1. IBD: Continue Lialda 2 tabs daily.  Colonoscopy and labs in  6 months.  Currently undergoing fertility treatment. emphasized importance of staying on IBD meds during pregnancy\par \par 2. IBS: Levsin for intermittent LLQ  pain / Digestive advantage recommended / hydration and fiber\par \par 3. C. diff: S/P fecal transplant...monitor for recurrence. Most recent stool was negative for  c. diff\par \par 4. GERD :  dietary and lifestyle modification\par \par 5. Periodic nausea:  prn Zofran if okay with fertility MD\par \par Risks of the procedure ( planned colonoscopy)  including but not limited to bleeding / perforation / infection / anesthesia complication / missed polyp or lesion explained to the  patient . The patient expressed understanding and a desire to proceed with the procedure.\par \par Risk of not doing procedure includes but is not limited to missed or delayed diagnosis of colon cancer or other colonic pathology\par

## 2021-02-01 ENCOUNTER — APPOINTMENT (OUTPATIENT)
Dept: HUMAN REPRODUCTION | Facility: CLINIC | Age: 36
End: 2021-02-01

## 2021-02-03 ENCOUNTER — APPOINTMENT (OUTPATIENT)
Dept: HUMAN REPRODUCTION | Facility: CLINIC | Age: 36
End: 2021-02-03
Payer: COMMERCIAL

## 2021-02-03 PROCEDURE — 36415 COLL VENOUS BLD VENIPUNCTURE: CPT

## 2021-02-03 PROCEDURE — 99072 ADDL SUPL MATRL&STAF TM PHE: CPT

## 2021-02-12 DIAGNOSIS — N63.10 UNSPECIFIED LUMP IN THE RIGHT BREAST, UNSPECIFIED QUADRANT: ICD-10-CM

## 2021-02-16 ENCOUNTER — RESULT REVIEW (OUTPATIENT)
Age: 36
End: 2021-02-16

## 2021-02-18 ENCOUNTER — APPOINTMENT (OUTPATIENT)
Dept: HUMAN REPRODUCTION | Facility: CLINIC | Age: 36
End: 2021-02-18
Payer: COMMERCIAL

## 2021-02-18 PROCEDURE — 99072 ADDL SUPL MATRL&STAF TM PHE: CPT

## 2021-02-18 PROCEDURE — 76830 TRANSVAGINAL US NON-OB: CPT

## 2021-02-18 PROCEDURE — 99213 OFFICE O/P EST LOW 20 MIN: CPT | Mod: 25

## 2021-02-18 PROCEDURE — 83001 ASSAY OF GONADOTROPIN (FSH): CPT | Mod: QW

## 2021-02-18 PROCEDURE — 82670 ASSAY OF TOTAL ESTRADIOL: CPT

## 2021-02-18 PROCEDURE — 84702 CHORIONIC GONADOTROPIN TEST: CPT

## 2021-02-18 PROCEDURE — 36415 COLL VENOUS BLD VENIPUNCTURE: CPT

## 2021-02-19 ENCOUNTER — APPOINTMENT (OUTPATIENT)
Dept: HEMATOLOGY ONCOLOGY | Facility: CLINIC | Age: 36
End: 2021-02-19
Payer: COMMERCIAL

## 2021-02-19 ENCOUNTER — RESULT REVIEW (OUTPATIENT)
Age: 36
End: 2021-02-19

## 2021-02-19 VITALS
DIASTOLIC BLOOD PRESSURE: 69 MMHG | WEIGHT: 115 LBS | HEART RATE: 101 BPM | HEIGHT: 66 IN | BODY MASS INDEX: 18.48 KG/M2 | TEMPERATURE: 98.5 F | SYSTOLIC BLOOD PRESSURE: 103 MMHG | RESPIRATION RATE: 18 BRPM | OXYGEN SATURATION: 100 %

## 2021-02-19 PROCEDURE — 99499A: CUSTOM | Mod: NC

## 2021-02-19 PROCEDURE — 99072 ADDL SUPL MATRL&STAF TM PHE: CPT

## 2021-02-22 ENCOUNTER — APPOINTMENT (OUTPATIENT)
Dept: HUMAN REPRODUCTION | Facility: CLINIC | Age: 36
End: 2021-02-22
Payer: COMMERCIAL

## 2021-02-22 PROCEDURE — 99072 ADDL SUPL MATRL&STAF TM PHE: CPT

## 2021-02-22 PROCEDURE — 36415 COLL VENOUS BLD VENIPUNCTURE: CPT

## 2021-02-22 PROCEDURE — 99213 OFFICE O/P EST LOW 20 MIN: CPT | Mod: 25

## 2021-02-22 PROCEDURE — 76830 TRANSVAGINAL US NON-OB: CPT

## 2021-02-23 ENCOUNTER — APPOINTMENT (OUTPATIENT)
Dept: HEMATOLOGY ONCOLOGY | Facility: CLINIC | Age: 36
End: 2021-02-23

## 2021-02-24 ENCOUNTER — APPOINTMENT (OUTPATIENT)
Dept: HUMAN REPRODUCTION | Facility: CLINIC | Age: 36
End: 2021-02-24
Payer: COMMERCIAL

## 2021-02-24 PROCEDURE — 82670 ASSAY OF TOTAL ESTRADIOL: CPT

## 2021-02-24 PROCEDURE — 76830 TRANSVAGINAL US NON-OB: CPT

## 2021-02-24 PROCEDURE — 99072 ADDL SUPL MATRL&STAF TM PHE: CPT

## 2021-02-24 PROCEDURE — 99213 OFFICE O/P EST LOW 20 MIN: CPT | Mod: 25

## 2021-02-24 PROCEDURE — 36415 COLL VENOUS BLD VENIPUNCTURE: CPT

## 2021-02-25 ENCOUNTER — APPOINTMENT (OUTPATIENT)
Dept: HUMAN REPRODUCTION | Facility: CLINIC | Age: 36
End: 2021-02-25
Payer: COMMERCIAL

## 2021-02-25 PROCEDURE — 36415 COLL VENOUS BLD VENIPUNCTURE: CPT

## 2021-02-25 PROCEDURE — 99072 ADDL SUPL MATRL&STAF TM PHE: CPT

## 2021-02-26 ENCOUNTER — APPOINTMENT (OUTPATIENT)
Dept: HUMAN REPRODUCTION | Facility: CLINIC | Age: 36
End: 2021-02-26
Payer: COMMERCIAL

## 2021-02-26 ENCOUNTER — RESULT REVIEW (OUTPATIENT)
Age: 36
End: 2021-02-26

## 2021-02-26 PROCEDURE — 99072 ADDL SUPL MATRL&STAF TM PHE: CPT

## 2021-02-26 PROCEDURE — 76830 TRANSVAGINAL US NON-OB: CPT

## 2021-02-26 PROCEDURE — 36415 COLL VENOUS BLD VENIPUNCTURE: CPT

## 2021-02-26 PROCEDURE — 99213 OFFICE O/P EST LOW 20 MIN: CPT | Mod: 25

## 2021-02-27 ENCOUNTER — APPOINTMENT (OUTPATIENT)
Dept: HUMAN REPRODUCTION | Facility: CLINIC | Age: 36
End: 2021-02-27
Payer: COMMERCIAL

## 2021-02-27 PROCEDURE — 36415 COLL VENOUS BLD VENIPUNCTURE: CPT

## 2021-02-27 PROCEDURE — 99213 OFFICE O/P EST LOW 20 MIN: CPT | Mod: 25

## 2021-02-27 PROCEDURE — 99072 ADDL SUPL MATRL&STAF TM PHE: CPT

## 2021-02-27 PROCEDURE — 76830 TRANSVAGINAL US NON-OB: CPT

## 2021-03-01 ENCOUNTER — RESULT REVIEW (OUTPATIENT)
Age: 36
End: 2021-03-01

## 2021-03-01 ENCOUNTER — APPOINTMENT (OUTPATIENT)
Dept: HUMAN REPRODUCTION | Facility: CLINIC | Age: 36
End: 2021-03-01
Payer: COMMERCIAL

## 2021-03-01 PROCEDURE — 99072 ADDL SUPL MATRL&STAF TM PHE: CPT

## 2021-03-01 PROCEDURE — 76830 TRANSVAGINAL US NON-OB: CPT

## 2021-03-01 PROCEDURE — 99213 OFFICE O/P EST LOW 20 MIN: CPT | Mod: 25

## 2021-03-01 PROCEDURE — 36415 COLL VENOUS BLD VENIPUNCTURE: CPT

## 2021-03-02 ENCOUNTER — APPOINTMENT (OUTPATIENT)
Dept: HUMAN REPRODUCTION | Facility: CLINIC | Age: 36
End: 2021-03-02
Payer: COMMERCIAL

## 2021-03-02 ENCOUNTER — APPOINTMENT (OUTPATIENT)
Dept: HUMAN REPRODUCTION | Facility: CLINIC | Age: 36
End: 2021-03-02

## 2021-03-02 PROCEDURE — 36415 COLL VENOUS BLD VENIPUNCTURE: CPT

## 2021-03-02 PROCEDURE — 99072 ADDL SUPL MATRL&STAF TM PHE: CPT

## 2021-03-02 PROCEDURE — 99212 OFFICE O/P EST SF 10 MIN: CPT | Mod: 25

## 2021-03-02 PROCEDURE — 76830 TRANSVAGINAL US NON-OB: CPT

## 2021-03-02 PROCEDURE — 82670 ASSAY OF TOTAL ESTRADIOL: CPT

## 2021-03-03 ENCOUNTER — APPOINTMENT (OUTPATIENT)
Dept: HUMAN REPRODUCTION | Facility: CLINIC | Age: 36
End: 2021-03-03
Payer: COMMERCIAL

## 2021-03-03 PROCEDURE — XXXXX: CPT

## 2021-03-03 PROCEDURE — 36415 COLL VENOUS BLD VENIPUNCTURE: CPT

## 2021-03-04 ENCOUNTER — APPOINTMENT (OUTPATIENT)
Dept: HUMAN REPRODUCTION | Facility: CLINIC | Age: 36
End: 2021-03-04
Payer: COMMERCIAL

## 2021-03-04 PROCEDURE — 89250 CULTR OOCYTE/EMBRYO <4 DAYS: CPT

## 2021-03-04 PROCEDURE — 89346 STORAGE/YEAR OOCYTE(S): CPT

## 2021-03-04 PROCEDURE — 76948 ECHO GUIDE OVA ASPIRATION: CPT

## 2021-03-04 PROCEDURE — 89337 CRYOPRESERVATION OOCYTE(S): CPT

## 2021-03-04 PROCEDURE — 58970 RETRIEVAL OF OOCYTE: CPT

## 2021-03-04 PROCEDURE — 89254 OOCYTE IDENTIFICATION: CPT

## 2021-03-04 PROCEDURE — 99072 ADDL SUPL MATRL&STAF TM PHE: CPT

## 2021-03-09 ENCOUNTER — APPOINTMENT (OUTPATIENT)
Dept: HUMAN REPRODUCTION | Facility: CLINIC | Age: 36
End: 2021-03-09
Payer: COMMERCIAL

## 2021-03-09 PROCEDURE — 99072 ADDL SUPL MATRL&STAF TM PHE: CPT

## 2021-03-09 PROCEDURE — 99212 OFFICE O/P EST SF 10 MIN: CPT | Mod: 25

## 2021-03-09 PROCEDURE — 76830 TRANSVAGINAL US NON-OB: CPT

## 2021-03-10 ENCOUNTER — APPOINTMENT (OUTPATIENT)
Dept: FAMILY MEDICINE | Facility: CLINIC | Age: 36
End: 2021-03-10
Payer: COMMERCIAL

## 2021-03-10 VITALS
DIASTOLIC BLOOD PRESSURE: 60 MMHG | OXYGEN SATURATION: 91 % | HEIGHT: 66 IN | SYSTOLIC BLOOD PRESSURE: 100 MMHG | WEIGHT: 120 LBS | RESPIRATION RATE: 17 BRPM | HEART RATE: 81 BPM | TEMPERATURE: 98.8 F | BODY MASS INDEX: 19.29 KG/M2

## 2021-03-10 DIAGNOSIS — R10.83 COLIC: ICD-10-CM

## 2021-03-10 DIAGNOSIS — N97.9 FEMALE INFERTILITY, UNSPECIFIED: ICD-10-CM

## 2021-03-10 PROCEDURE — 99213 OFFICE O/P EST LOW 20 MIN: CPT

## 2021-03-10 PROCEDURE — 99072 ADDL SUPL MATRL&STAF TM PHE: CPT

## 2021-03-12 ENCOUNTER — RESULT REVIEW (OUTPATIENT)
Age: 36
End: 2021-03-12

## 2021-03-18 PROBLEM — R10.83 INTESTINAL COLIC: Status: ACTIVE | Noted: 2021-03-10

## 2021-03-18 PROBLEM — N97.9 INFERTILITY, FEMALE: Status: ACTIVE | Noted: 2019-11-08

## 2021-03-18 NOTE — PHYSICAL EXAM
[No Acute Distress] : no acute distress [Well Nourished] : well nourished [Well Developed] : well developed [Well-Appearing] : well-appearing [Normal Voice/Communication] : normal voice/communication [Normal Sclera/Conjunctiva] : normal sclera/conjunctiva [PERRL] : pupils equal round and reactive to light [EOMI] : extraocular movements intact [No JVD] : no jugular venous distention [Supple] : supple [No Respiratory Distress] : no respiratory distress  [Clear to Auscultation] : lungs were clear to auscultation bilaterally [Normal Rate] : normal rate  [Regular Rhythm] : with a regular rhythm [Normal S1, S2] : normal S1 and S2 [No Murmur] : no murmur heard [No Carotid Bruits] : no carotid bruits [No Abdominal Bruit] : a ~M bruit was not heard ~T in the abdomen [Pedal Pulses Present] : the pedal pulses are present [No Edema] : there was no peripheral edema [No Palpable Aorta] : no palpable aorta [Soft] : abdomen soft [Non Tender] : non-tender [Non-distended] : non-distended [No Masses] : no abdominal mass palpated [No HSM] : no HSM [Normal Bowel Sounds] : normal bowel sounds [Normal Axillary Nodes] : no axillary lymphadenopathy [Normal Posterior Cervical Nodes] : no posterior cervical lymphadenopathy [Normal Anterior Cervical Nodes] : no anterior cervical lymphadenopathy [Normal Inguinal Nodes] : no inguinal lymphadenopathy [No CVA Tenderness] : no CVA  tenderness [No Spinal Tenderness] : no spinal tenderness [No Joint Swelling] : no joint swelling [Grossly Normal Strength/Tone] : grossly normal strength/tone [No Rash] : no rash [Coordination Grossly Intact] : coordination grossly intact [No Focal Deficits] : no focal deficits [Normal Gait] : normal gait [de-identified] : No rebound or percussive tenderness

## 2021-03-18 NOTE — HISTORY OF PRESENT ILLNESS
[FreeTextEntry1] : Nausea, vomiting, bloating and cramping x1 week [de-identified] : Patient presents with a history of nausea, vomiting, bloating and cramping for the last week. No fever or chills. No diarrhea or constipation. No melena, hematochezia, pus or mucus noted in stools. Underwent egg retrieval procedure within the last week. Recently evaluated by GI who felt symptoms were unrelated to underlying inflammatory bowel disease, but rather the procedure. Reports increased anxiety her last 2 weeks, and requesting refill of clonazepam.

## 2021-03-18 NOTE — REVIEW OF SYSTEMS
[Fatigue] : fatigue [Recent Change In Weight] : ~T recent weight change [Abdominal Pain] : abdominal pain [Nausea] : nausea [Vomiting] : vomiting [Negative] : Heme/Lymph [Fever] : no fever [Chills] : no chills [Constipation] : no constipation [Diarrhea] : diarrhea [Heartburn] : no heartburn [Melena] : no melena [Skin Rash] : no skin rash [Swollen Glands] : no swollen glands

## 2021-03-26 ENCOUNTER — APPOINTMENT (OUTPATIENT)
Dept: FAMILY MEDICINE | Facility: CLINIC | Age: 36
End: 2021-03-26
Payer: COMMERCIAL

## 2021-03-26 VITALS
BODY MASS INDEX: 18.64 KG/M2 | DIASTOLIC BLOOD PRESSURE: 60 MMHG | RESPIRATION RATE: 18 BRPM | TEMPERATURE: 99 F | HEIGHT: 66 IN | OXYGEN SATURATION: 99 % | HEART RATE: 81 BPM | WEIGHT: 116 LBS | SYSTOLIC BLOOD PRESSURE: 100 MMHG

## 2021-03-26 PROCEDURE — 99214 OFFICE O/P EST MOD 30 MIN: CPT

## 2021-03-26 PROCEDURE — 99072 ADDL SUPL MATRL&STAF TM PHE: CPT

## 2021-04-01 ENCOUNTER — APPOINTMENT (OUTPATIENT)
Dept: FAMILY MEDICINE | Facility: CLINIC | Age: 36
End: 2021-04-01
Payer: COMMERCIAL

## 2021-04-01 ENCOUNTER — RX RENEWAL (OUTPATIENT)
Age: 36
End: 2021-04-01

## 2021-04-01 VITALS
OXYGEN SATURATION: 99 % | HEIGHT: 66 IN | SYSTOLIC BLOOD PRESSURE: 118 MMHG | HEART RATE: 96 BPM | TEMPERATURE: 99.5 F | BODY MASS INDEX: 18.64 KG/M2 | RESPIRATION RATE: 17 BRPM | WEIGHT: 116 LBS | DIASTOLIC BLOOD PRESSURE: 60 MMHG

## 2021-04-01 DIAGNOSIS — S60.222D CONTUSION OF LEFT HAND, SUBSEQUENT ENCOUNTER: ICD-10-CM

## 2021-04-01 DIAGNOSIS — N30.00 ACUTE CYSTITIS W/OUT HEMATURIA: ICD-10-CM

## 2021-04-01 DIAGNOSIS — Z11.59 ENCOUNTER FOR SCREENING FOR OTHER VIRAL DISEASES: ICD-10-CM

## 2021-04-01 DIAGNOSIS — S60.222A CONTUSION OF LEFT HAND, INITIAL ENCOUNTER: ICD-10-CM

## 2021-04-01 PROCEDURE — 99072 ADDL SUPL MATRL&STAF TM PHE: CPT

## 2021-04-01 PROCEDURE — 99214 OFFICE O/P EST MOD 30 MIN: CPT

## 2021-04-01 NOTE — REVIEW OF SYSTEMS
[Fatigue] : fatigue [Abdominal Pain] : abdominal pain [Recent Change In Weight] : ~T recent weight change [Nausea] : nausea [Vomiting] : vomiting [Headache] : headache [Insomnia] : insomnia [Anxiety] : anxiety [Depression] : depression [Negative] : Heme/Lymph [Constipation] : no constipation [Diarrhea] : diarrhea [Heartburn] : no heartburn [Melena] : no melena [Skin Rash] : no skin rash [Suicidal] : not suicidal [Swollen Glands] : no swollen glands [FreeTextEntry2] : Decrease in weight

## 2021-04-01 NOTE — PHYSICAL EXAM
[Well Nourished] : well nourished [No Acute Distress] : no acute distress [Well Developed] : well developed [Well-Appearing] : well-appearing [Normal Voice/Communication] : normal voice/communication [Normal Sclera/Conjunctiva] : normal sclera/conjunctiva [PERRL] : pupils equal round and reactive to light [EOMI] : extraocular movements intact [No JVD] : no jugular venous distention [Supple] : supple [No Respiratory Distress] : no respiratory distress  [Clear to Auscultation] : lungs were clear to auscultation bilaterally [Normal Rate] : normal rate  [Regular Rhythm] : with a regular rhythm [Normal S1, S2] : normal S1 and S2 [No Murmur] : no murmur heard [No Carotid Bruits] : no carotid bruits [No Abdominal Bruit] : a ~M bruit was not heard ~T in the abdomen [Pedal Pulses Present] : the pedal pulses are present [No Edema] : there was no peripheral edema [No Palpable Aorta] : no palpable aorta [Soft] : abdomen soft [Non Tender] : non-tender [Non-distended] : non-distended [No Masses] : no abdominal mass palpated [No HSM] : no HSM [Normal Bowel Sounds] : normal bowel sounds [Normal Axillary Nodes] : no axillary lymphadenopathy [Normal Posterior Cervical Nodes] : no posterior cervical lymphadenopathy [Normal Anterior Cervical Nodes] : no anterior cervical lymphadenopathy [Normal Inguinal Nodes] : no inguinal lymphadenopathy [No CVA Tenderness] : no CVA  tenderness [No Joint Swelling] : no joint swelling [No Spinal Tenderness] : no spinal tenderness [Grossly Normal Strength/Tone] : grossly normal strength/tone [No Rash] : no rash [Coordination Grossly Intact] : coordination grossly intact [No Focal Deficits] : no focal deficits [Normal Gait] : normal gait [de-identified] : No rebound or percussive tenderness

## 2021-04-01 NOTE — HISTORY OF PRESENT ILLNESS
[FreeTextEntry1] : Followup of depression, anxiety and obsessive thoughts [de-identified] : Patient presents for followup of depressive/anxiety symptoms and obsessive thoughts. Presently on paroxetine 10 mg/day only, but tolerating well. Taking clonazepam 0.5-1 mg HS. Had long discussion with patient's therapist-Catalina Freire-prior to office visit, who states patient is decompensating and not doing well at all. Is calling therapist almost on a daily basis, tearful and not sure how to move forward. Admits to having obsessive and racing thoughts. Is unable to "turn off thoughts". Expresses feelings of helplessness and hopelessness. (+)anhedonia and anergy. Continues to have insomnia. Patient blames himself for all failures in her life and feels worthless.  Denies any suicidal or homicidal ideations. Therapist has been looking for a psychiatrist for whom to recur the patient, and has been unsuccessful up to this point.

## 2021-04-04 PROBLEM — N30.00 ACUTE CYSTITIS WITHOUT HEMATURIA: Status: RESOLVED | Noted: 2020-01-06 | Resolved: 2021-04-04

## 2021-04-04 PROBLEM — Z11.59 ENCOUNTER FOR HEPATITIS C SCREENING TEST FOR LOW RISK PATIENT: Status: RESOLVED | Noted: 2020-01-02 | Resolved: 2021-04-04

## 2021-04-04 PROBLEM — S60.222A CONTUSION OF LEFT HAND, INITIAL ENCOUNTER: Status: RESOLVED | Noted: 2020-12-23 | Resolved: 2021-04-04

## 2021-04-04 PROBLEM — S60.222D CONTUSION OF LEFT HAND, SUBSEQUENT ENCOUNTER: Status: RESOLVED | Noted: 2020-12-23 | Resolved: 2021-04-04

## 2021-04-04 NOTE — PHYSICAL EXAM
[No Acute Distress] : no acute distress [Well Nourished] : well nourished [Well Developed] : well developed [Well-Appearing] : well-appearing [Normal Voice/Communication] : normal voice/communication [Normal Sclera/Conjunctiva] : normal sclera/conjunctiva [PERRL] : pupils equal round and reactive to light [EOMI] : extraocular movements intact [No JVD] : no jugular venous distention [Supple] : supple [No Respiratory Distress] : no respiratory distress  [Clear to Auscultation] : lungs were clear to auscultation bilaterally [Normal Rate] : normal rate  [Regular Rhythm] : with a regular rhythm [Normal S1, S2] : normal S1 and S2 [No Murmur] : no murmur heard [No Carotid Bruits] : no carotid bruits [No Abdominal Bruit] : a ~M bruit was not heard ~T in the abdomen [Pedal Pulses Present] : the pedal pulses are present [No Edema] : there was no peripheral edema [No Palpable Aorta] : no palpable aorta [Soft] : abdomen soft [Non Tender] : non-tender [Non-distended] : non-distended [No Masses] : no abdominal mass palpated [No HSM] : no HSM [Normal Bowel Sounds] : normal bowel sounds [Normal Axillary Nodes] : no axillary lymphadenopathy [Normal Posterior Cervical Nodes] : no posterior cervical lymphadenopathy [Normal Anterior Cervical Nodes] : no anterior cervical lymphadenopathy [Normal Inguinal Nodes] : no inguinal lymphadenopathy [No CVA Tenderness] : no CVA  tenderness [No Spinal Tenderness] : no spinal tenderness [No Joint Swelling] : no joint swelling [Grossly Normal Strength/Tone] : grossly normal strength/tone [No Rash] : no rash [Coordination Grossly Intact] : coordination grossly intact [No Focal Deficits] : no focal deficits [Normal Gait] : normal gait [de-identified] : No rebound or percussive tenderness

## 2021-04-04 NOTE — HISTORY OF PRESENT ILLNESS
[FreeTextEntry1] : Followup of depression/anxiety and obsessive thoughts. [de-identified] : Patient presents for followup of depressive/anxiety symptoms and obsessive thoughts. Presently on sertraline 25 mg/day only. Was unable to tolerate paroxetine at doses greater than 10 mg/day. Was experiencing increased headaches, increased GI toxicity and worsening anxiety. Tolerating sertraline 25 mg/day. Has not increased to 50 mg/day as instructed. In addition, was instructed not to start Lamictal prescription. The patient's therapist-Catalina Freire-has been attempting to find a psychiatrist willing to assume the case, but has been unable to do so. Patient expresses some reluctance to seeing a psychiatrist at this time. This is due to not having had positive experiences with psychiatrists in the past. Continues to work on a regular basis, but continues to find this difficult. Remains tearful and apprehensive. Expresses feelings of helplessness and hopelessness. (+)anhedonia and anergy. Continues to deny any suicidal ideation. Weight has been stable since the last visit.

## 2021-04-04 NOTE — REVIEW OF SYSTEMS
[Fatigue] : fatigue [Recent Change In Weight] : ~T recent weight change [Abdominal Pain] : abdominal pain [Nausea] : nausea [Vomiting] : vomiting [Headache] : headache [Insomnia] : insomnia [Anxiety] : anxiety [Depression] : depression [Negative] : Heme/Lymph [Constipation] : no constipation [Diarrhea] : diarrhea [Heartburn] : no heartburn [Melena] : no melena [Skin Rash] : no skin rash [Suicidal] : not suicidal [Swollen Glands] : no swollen glands [FreeTextEntry2] : Decrease in weight

## 2021-04-07 ENCOUNTER — APPOINTMENT (OUTPATIENT)
Dept: FAMILY MEDICINE | Facility: CLINIC | Age: 36
End: 2021-04-07
Payer: COMMERCIAL

## 2021-04-07 VITALS
SYSTOLIC BLOOD PRESSURE: 110 MMHG | DIASTOLIC BLOOD PRESSURE: 60 MMHG | TEMPERATURE: 99 F | HEIGHT: 66 IN | OXYGEN SATURATION: 99 % | HEART RATE: 82 BPM | BODY MASS INDEX: 18.64 KG/M2 | WEIGHT: 116 LBS | RESPIRATION RATE: 18 BRPM

## 2021-04-07 DIAGNOSIS — K51.918 ULCERATIVE COLITIS, UNSPECIFIED WITH OTHER COMPLICATION: ICD-10-CM

## 2021-04-07 DIAGNOSIS — L70.0 ACNE VULGARIS: ICD-10-CM

## 2021-04-07 PROCEDURE — 99214 OFFICE O/P EST MOD 30 MIN: CPT

## 2021-04-07 PROCEDURE — 99072 ADDL SUPL MATRL&STAF TM PHE: CPT

## 2021-04-07 NOTE — HISTORY OF PRESENT ILLNESS
[FreeTextEntry1] : Followup of depression/anxiety and obsessive thoughts. [de-identified] : Patient presents for followup of depressive/anxiety symptoms and obsessive thoughts. Presently on sertraline 25 mg/day only. Was unable to tolerate paroxetine at doses greater than 10 mg/day. Was experiencing increased headaches, increased GI toxicity and worsening anxiety. Tolerating sertraline 50 mg/day. Has not increased to 100 mg/day as instructed. Has not started mood stabilizer as instructed. Will probably use olanzapine instead of lamotrigine. Using lorazepam in place of clonazepam with improved anxiety/initial insomnia control. The patient's therapist-Catalina Freire-has been attempting to find a psychiatrist willing to assume the case, but has been unable to do so. Patient expresses some reluctance to seeing a psychiatrist at this time, but now willing.to do so This is due to not having had positive experiences with psychiatrists in the past. Continues to work on a regular basis, but continues to find this difficult. Remains tearful and apprehensive. Expresses feelings of helplessness and hopelessness. (+)anhedonia and anergy. Continues to deny any suicidal ideation. Weight has been stable since the last visit. Continues to have intermittent nausea with occasional emesis, and abdominal colic. Patient feels this is anxiety related. In addition, acne vulgaris has recurred and patient wishes to be restarted on topical medication.

## 2021-04-07 NOTE — PHYSICAL EXAM
[No Acute Distress] : no acute distress [Well Nourished] : well nourished [Well Developed] : well developed [Well-Appearing] : well-appearing [Normal Voice/Communication] : normal voice/communication [Normal Sclera/Conjunctiva] : normal sclera/conjunctiva [PERRL] : pupils equal round and reactive to light [EOMI] : extraocular movements intact [No JVD] : no jugular venous distention [Supple] : supple [No Respiratory Distress] : no respiratory distress  [Clear to Auscultation] : lungs were clear to auscultation bilaterally [Normal Rate] : normal rate  [Regular Rhythm] : with a regular rhythm [Normal S1, S2] : normal S1 and S2 [No Murmur] : no murmur heard [No Carotid Bruits] : no carotid bruits [No Abdominal Bruit] : a ~M bruit was not heard ~T in the abdomen [Pedal Pulses Present] : the pedal pulses are present [No Edema] : there was no peripheral edema [No Palpable Aorta] : no palpable aorta [Soft] : abdomen soft [Non Tender] : non-tender [Non-distended] : non-distended [No Masses] : no abdominal mass palpated [No HSM] : no HSM [Normal Bowel Sounds] : normal bowel sounds [Normal Axillary Nodes] : no axillary lymphadenopathy [Normal Posterior Cervical Nodes] : no posterior cervical lymphadenopathy [Normal Anterior Cervical Nodes] : no anterior cervical lymphadenopathy [Normal Inguinal Nodes] : no inguinal lymphadenopathy [No CVA Tenderness] : no CVA  tenderness [No Spinal Tenderness] : no spinal tenderness [No Joint Swelling] : no joint swelling [Grossly Normal Strength/Tone] : grossly normal strength/tone [No Rash] : no rash [Coordination Grossly Intact] : coordination grossly intact [No Focal Deficits] : no focal deficits [Normal Gait] : normal gait [de-identified] : No rebound or percussive tenderness

## 2021-04-11 ENCOUNTER — APPOINTMENT (OUTPATIENT)
Dept: FAMILY MEDICINE | Facility: CLINIC | Age: 36
End: 2021-04-11
Payer: COMMERCIAL

## 2021-04-11 VITALS
SYSTOLIC BLOOD PRESSURE: 118 MMHG | OXYGEN SATURATION: 98 % | HEART RATE: 70 BPM | BODY MASS INDEX: 18.8 KG/M2 | TEMPERATURE: 98 F | WEIGHT: 117 LBS | DIASTOLIC BLOOD PRESSURE: 60 MMHG | HEIGHT: 66 IN | RESPIRATION RATE: 17 BRPM

## 2021-04-11 PROCEDURE — 99072 ADDL SUPL MATRL&STAF TM PHE: CPT

## 2021-04-11 PROCEDURE — 99214 OFFICE O/P EST MOD 30 MIN: CPT | Mod: 25

## 2021-04-11 PROCEDURE — 36415 COLL VENOUS BLD VENIPUNCTURE: CPT

## 2021-04-11 NOTE — PHYSICAL EXAM
[No Acute Distress] : no acute distress [Well Nourished] : well nourished [Well Developed] : well developed [Well-Appearing] : well-appearing [Normal Voice/Communication] : normal voice/communication [Normal Sclera/Conjunctiva] : normal sclera/conjunctiva [PERRL] : pupils equal round and reactive to light [EOMI] : extraocular movements intact [No JVD] : no jugular venous distention [Supple] : supple [No Respiratory Distress] : no respiratory distress  [Clear to Auscultation] : lungs were clear to auscultation bilaterally [Normal Rate] : normal rate  [Regular Rhythm] : with a regular rhythm [Normal S1, S2] : normal S1 and S2 [No Murmur] : no murmur heard [No Carotid Bruits] : no carotid bruits [No Abdominal Bruit] : a ~M bruit was not heard ~T in the abdomen [Pedal Pulses Present] : the pedal pulses are present [No Edema] : there was no peripheral edema [No Palpable Aorta] : no palpable aorta [Soft] : abdomen soft [Non Tender] : non-tender [Non-distended] : non-distended [No Masses] : no abdominal mass palpated [No HSM] : no HSM [Normal Bowel Sounds] : normal bowel sounds [Normal Axillary Nodes] : no axillary lymphadenopathy [Normal Posterior Cervical Nodes] : no posterior cervical lymphadenopathy [Normal Anterior Cervical Nodes] : no anterior cervical lymphadenopathy [Normal Inguinal Nodes] : no inguinal lymphadenopathy [No CVA Tenderness] : no CVA  tenderness [No Spinal Tenderness] : no spinal tenderness [No Joint Swelling] : no joint swelling [Grossly Normal Strength/Tone] : grossly normal strength/tone [No Rash] : no rash [Coordination Grossly Intact] : coordination grossly intact [No Focal Deficits] : no focal deficits [Normal Gait] : normal gait [de-identified] : No rebound or percussive tenderness

## 2021-04-11 NOTE — HISTORY OF PRESENT ILLNESS
[FreeTextEntry1] : Followup of depression/anxiety and obsessive thoughts. [de-identified] : Patient presents for followup of depressive/anxiety symptoms and obsessive thoughts. Presently on sertraline 75 mg/day only. Was unable to tolerate paroxetine at doses greater than 10 mg/day. Was experiencing increased headaches, increased GI toxicity and worsening anxiety. Tolerating sertraline 75 mg/day, and will be increasing to 100 mg/day tonight as instructed. Using lorazepam in place of clonazepam with improved anxiety/initial insomnia control. The patient's therapist-Catalina Freire-has been attempting to find a psychiatrist willing to assume the case, but has been unable to do so. Patient expresses some reluctance to seeing a psychiatrist at this time, but now willing.to do so This is due to not having had positive experiences with psychiatrists in the past. Continues to work on a regular basis, but continues to find this difficult. Remains tearful and apprehensive. Expresses feelings of helplessness and hopelessness. (+)anhedonia and anergy. Continues to deny any suicidal ideation. Weight has been stable since the last visit. Continues to have intermittent nausea with occasional emesis, and abdominal colic. Patient feels this is anxiety related. Patient requesting followup lab work to check electrolytes.

## 2021-04-18 ENCOUNTER — APPOINTMENT (OUTPATIENT)
Dept: FAMILY MEDICINE | Facility: CLINIC | Age: 36
End: 2021-04-18
Payer: COMMERCIAL

## 2021-04-18 VITALS
HEART RATE: 80 BPM | TEMPERATURE: 97.3 F | WEIGHT: 124 LBS | DIASTOLIC BLOOD PRESSURE: 70 MMHG | HEIGHT: 66 IN | RESPIRATION RATE: 17 BRPM | SYSTOLIC BLOOD PRESSURE: 110 MMHG | BODY MASS INDEX: 19.93 KG/M2 | OXYGEN SATURATION: 99 %

## 2021-04-18 PROCEDURE — 99214 OFFICE O/P EST MOD 30 MIN: CPT

## 2021-04-18 PROCEDURE — 99072 ADDL SUPL MATRL&STAF TM PHE: CPT

## 2021-04-19 ENCOUNTER — APPOINTMENT (OUTPATIENT)
Dept: FAMILY MEDICINE | Facility: CLINIC | Age: 36
End: 2021-04-19

## 2021-04-20 LAB
ALBUMIN SERPL ELPH-MCNC: 4.6 G/DL
ALP BLD-CCNC: 72 U/L
ALT SERPL-CCNC: 14 U/L
ANION GAP SERPL CALC-SCNC: 12 MMOL/L
AST SERPL-CCNC: 17 U/L
BASOPHILS # BLD AUTO: 0.05 K/UL
BASOPHILS NFR BLD AUTO: 0.7 %
BILIRUB SERPL-MCNC: 0.3 MG/DL
BUN SERPL-MCNC: 9 MG/DL
CALCIUM SERPL-MCNC: 9.3 MG/DL
CHLORIDE SERPL-SCNC: 105 MMOL/L
CO2 SERPL-SCNC: 22 MMOL/L
CREAT SERPL-MCNC: 0.57 MG/DL
EOSINOPHIL # BLD AUTO: 0.09 K/UL
EOSINOPHIL NFR BLD AUTO: 1.2 %
GLUCOSE SERPL-MCNC: 117 MG/DL
HCT VFR BLD CALC: 44.2 %
HGB BLD-MCNC: 13.8 G/DL
IMM GRANULOCYTES NFR BLD AUTO: 0.1 %
LYMPHOCYTES # BLD AUTO: 2.5 K/UL
LYMPHOCYTES NFR BLD AUTO: 33.4 %
MAN DIFF?: NORMAL
MCHC RBC-ENTMCNC: 31 PG
MCHC RBC-ENTMCNC: 31.2 GM/DL
MCV RBC AUTO: 99.3 FL
MONOCYTES # BLD AUTO: 0.5 K/UL
MONOCYTES NFR BLD AUTO: 6.7 %
NEUTROPHILS # BLD AUTO: 4.34 K/UL
NEUTROPHILS NFR BLD AUTO: 57.9 %
PLATELET # BLD AUTO: 261 K/UL
POTASSIUM SERPL-SCNC: 3.9 MMOL/L
PROT SERPL-MCNC: 7.4 G/DL
RBC # BLD: 4.45 M/UL
RBC # FLD: 11.6 %
SODIUM SERPL-SCNC: 139 MMOL/L
T4 FREE SERPL-MCNC: 1.1 NG/DL
TSH SERPL-ACNC: 0.75 UIU/ML
WBC # FLD AUTO: 7.49 K/UL

## 2021-04-25 ENCOUNTER — APPOINTMENT (OUTPATIENT)
Dept: FAMILY MEDICINE | Facility: CLINIC | Age: 36
End: 2021-04-25
Payer: COMMERCIAL

## 2021-04-25 VITALS
WEIGHT: 124 LBS | HEART RATE: 93 BPM | SYSTOLIC BLOOD PRESSURE: 100 MMHG | TEMPERATURE: 99 F | OXYGEN SATURATION: 99 % | RESPIRATION RATE: 18 BRPM | HEIGHT: 66 IN | DIASTOLIC BLOOD PRESSURE: 60 MMHG | BODY MASS INDEX: 19.93 KG/M2

## 2021-04-25 PROCEDURE — 99072 ADDL SUPL MATRL&STAF TM PHE: CPT

## 2021-04-25 PROCEDURE — 99213 OFFICE O/P EST LOW 20 MIN: CPT

## 2021-04-25 NOTE — HISTORY OF PRESENT ILLNESS
[FreeTextEntry1] : Followup of depression/anxiety and obsessive thoughts. [de-identified] : Patient presents for followup of depressive/anxiety symptoms and obsessive thoughts. Presently on sertraline 100 mg/day only. Was unable to tolerate paroxetine at doses greater than 10 mg/day. Was experiencing increased headaches, increased GI toxicity and worsening anxiety. Tolerating sertraline 100 mg/day, and will be increasing to 125 mg/day tonight as instructed. Using lorazepam in place of clonazepam with improved anxiety/initial insomnia control. Adjunctive therapy of olanzapine 2.5 mg HS, tolerated well. Patient reports increase in appetite and weight. Also, reports no nausea over the last week. Was able to discuss case with psychiatry-Pranay Lebron MD-and patient will have an appointment tomorrow. Overall, feels better than one week ago and feels is now making steady improvement. Tolerating all medications well. Patient is much more "upbeat" on this appointment. Continues to deny any suicidal or homicidal ideations. Meets with therapist weekly. In addition, continues to work on a regular work schedule. (+)prior history of bulimia.

## 2021-04-25 NOTE — REVIEW OF SYSTEMS
[Recent Change In Weight] : ~T recent weight change [Insomnia] : insomnia [Anxiety] : anxiety [Depression] : depression [Negative] : Heme/Lymph [Abdominal Pain] : no abdominal pain [Nausea] : no nausea [Constipation] : no constipation [Diarrhea] : diarrhea [Vomiting] : no vomiting [Heartburn] : no heartburn [Melena] : no melena [Skin Rash] : no skin rash [Suicidal] : not suicidal [FreeTextEntry2] : Increased weight

## 2021-04-25 NOTE — PHYSICAL EXAM
[No Acute Distress] : no acute distress [Well Nourished] : well nourished [Well Developed] : well developed [Well-Appearing] : well-appearing [Normal Voice/Communication] : normal voice/communication [Normal Sclera/Conjunctiva] : normal sclera/conjunctiva [PERRL] : pupils equal round and reactive to light [EOMI] : extraocular movements intact [No JVD] : no jugular venous distention [Supple] : supple [No Respiratory Distress] : no respiratory distress  [Clear to Auscultation] : lungs were clear to auscultation bilaterally [Normal Rate] : normal rate  [Regular Rhythm] : with a regular rhythm [Normal S1, S2] : normal S1 and S2 [No Murmur] : no murmur heard [No Carotid Bruits] : no carotid bruits [No Abdominal Bruit] : a ~M bruit was not heard ~T in the abdomen [Pedal Pulses Present] : the pedal pulses are present [No Edema] : there was no peripheral edema [No Palpable Aorta] : no palpable aorta [Soft] : abdomen soft [Non Tender] : non-tender [No HSM] : no HSM [Normal Axillary Nodes] : no axillary lymphadenopathy [Normal Posterior Cervical Nodes] : no posterior cervical lymphadenopathy [Normal Anterior Cervical Nodes] : no anterior cervical lymphadenopathy [Normal Inguinal Nodes] : no inguinal lymphadenopathy [No Rash] : no rash [Coordination Grossly Intact] : coordination grossly intact [No Focal Deficits] : no focal deficits [Normal Gait] : normal gait [Speech Grossly Normal] : speech grossly normal [Memory Grossly Normal] : memory grossly normal [Alert and Oriented x3] : oriented to person, place, and time [de-identified] : Less depressed and anxious

## 2021-04-25 NOTE — REVIEW OF SYSTEMS
[Fatigue] : fatigue [Recent Change In Weight] : ~T recent weight change [Headache] : headache [Insomnia] : insomnia [Anxiety] : anxiety [Depression] : depression [Negative] : Heme/Lymph [Diarrhea] : diarrhea [Skin Rash] : no skin rash [Suicidal] : not suicidal [Swollen Glands] : no swollen glands [FreeTextEntry2] : Increase in weight

## 2021-04-25 NOTE — PHYSICAL EXAM
[No Acute Distress] : no acute distress [Well Nourished] : well nourished [Well Developed] : well developed [Well-Appearing] : well-appearing [Normal Voice/Communication] : normal voice/communication [Normal Sclera/Conjunctiva] : normal sclera/conjunctiva [PERRL] : pupils equal round and reactive to light [EOMI] : extraocular movements intact [No JVD] : no jugular venous distention [Supple] : supple [No Respiratory Distress] : no respiratory distress  [Clear to Auscultation] : lungs were clear to auscultation bilaterally [Normal Rate] : normal rate  [Regular Rhythm] : with a regular rhythm [Normal S1, S2] : normal S1 and S2 [No Murmur] : no murmur heard [No Carotid Bruits] : no carotid bruits [No Abdominal Bruit] : a ~M bruit was not heard ~T in the abdomen [Pedal Pulses Present] : the pedal pulses are present [No Edema] : there was no peripheral edema [No Palpable Aorta] : no palpable aorta [Soft] : abdomen soft [Non Tender] : non-tender [Non-distended] : non-distended [No Masses] : no abdominal mass palpated [No HSM] : no HSM [Normal Bowel Sounds] : normal bowel sounds [Normal Axillary Nodes] : no axillary lymphadenopathy [Normal Posterior Cervical Nodes] : no posterior cervical lymphadenopathy [Normal Anterior Cervical Nodes] : no anterior cervical lymphadenopathy [Normal Inguinal Nodes] : no inguinal lymphadenopathy [No Rash] : no rash [Coordination Grossly Intact] : coordination grossly intact [No Focal Deficits] : no focal deficits [Normal Gait] : normal gait [Speech Grossly Normal] : speech grossly normal [Memory Grossly Normal] : memory grossly normal [Alert and Oriented x3] : oriented to person, place, and time [de-identified] : No rebound or percussive tenderness [de-identified] : Less depressed and anxious

## 2021-04-25 NOTE — HISTORY OF PRESENT ILLNESS
[FreeTextEntry1] : Followup of depression/anxiety and obsessive thoughts. [de-identified] : Patient presents for followup of depressive/anxiety symptoms and obsessive thoughts. Presently on sertraline 125 mg/day only. Was unable to tolerate paroxetine at doses greater than 10 mg/day. Was experiencing increased headaches, increased GI toxicity and worsening anxiety. Tolerating sertraline 125 mg/day, and will be maintaining per psychiatry. Using lorazepam in place of clonazepam with improved anxiety/initial insomnia control. Adjunctive therapy of olanzapine 2.5 mg HS, tolerated well. Patient reports increase in appetite and weight. Also, reports no nausea since starting olanzapine. Patient had first appointment with psychiatry-Pranay Lebron MD-and patient reports session went well. Scheduled to have another appointment in 2 weeks. Consideration was given to CBT, and psychiatry will be discussing with the patient's therapist, the best program for the patient. Overall, continues to improve and feels is now making steady improvement. Tolerating all medications well. Patient remains positive, and looking forward to further therapy. Continues to deny any suicidal or homicidal ideations. Meets with therapist weekly. In addition, continues to work on a regular work schedule.

## 2021-04-30 ENCOUNTER — APPOINTMENT (OUTPATIENT)
Dept: FAMILY MEDICINE | Facility: CLINIC | Age: 36
End: 2021-04-30
Payer: COMMERCIAL

## 2021-04-30 VITALS
TEMPERATURE: 98.9 F | SYSTOLIC BLOOD PRESSURE: 100 MMHG | OXYGEN SATURATION: 98 % | HEART RATE: 88 BPM | RESPIRATION RATE: 17 BRPM | BODY MASS INDEX: 19.77 KG/M2 | HEIGHT: 66 IN | DIASTOLIC BLOOD PRESSURE: 60 MMHG | WEIGHT: 123 LBS

## 2021-04-30 DIAGNOSIS — L30.9 DERMATITIS, UNSPECIFIED: ICD-10-CM

## 2021-04-30 DIAGNOSIS — F42.8 OTHER OBSESSIVE COMPULSIVE DISORDER: ICD-10-CM

## 2021-04-30 PROCEDURE — 99214 OFFICE O/P EST MOD 30 MIN: CPT

## 2021-04-30 PROCEDURE — 99072 ADDL SUPL MATRL&STAF TM PHE: CPT

## 2021-05-02 PROBLEM — L30.9 DERMATITIS: Status: ACTIVE | Noted: 2021-04-30

## 2021-05-02 PROBLEM — F42.8 OBSESSIVE THINKING: Status: ACTIVE | Noted: 2021-03-26

## 2021-05-02 NOTE — REVIEW OF SYSTEMS
[Recent Change In Weight] : ~T recent weight change [Itching] : Itching [Skin Rash] : skin rash [Insomnia] : insomnia [Anxiety] : anxiety [Depression] : depression [Negative] : Heme/Lymph [Abdominal Pain] : no abdominal pain [Nausea] : no nausea [Constipation] : no constipation [Diarrhea] : diarrhea [Vomiting] : no vomiting [Heartburn] : no heartburn [Melena] : no melena [Suicidal] : not suicidal [Swollen Glands] : no swollen glands [FreeTextEntry2] : Increased weight

## 2021-05-02 NOTE — PHYSICAL EXAM
[No Acute Distress] : no acute distress [Well Nourished] : well nourished [Well Developed] : well developed [Well-Appearing] : well-appearing [Normal Voice/Communication] : normal voice/communication [Normal Sclera/Conjunctiva] : normal sclera/conjunctiva [PERRL] : pupils equal round and reactive to light [EOMI] : extraocular movements intact [No JVD] : no jugular venous distention [Supple] : supple [No Respiratory Distress] : no respiratory distress  [Clear to Auscultation] : lungs were clear to auscultation bilaterally [Normal Rate] : normal rate  [Normal S1, S2] : normal S1 and S2 [Regular Rhythm] : with a regular rhythm [No Murmur] : no murmur heard [No Carotid Bruits] : no carotid bruits [No Abdominal Bruit] : a ~M bruit was not heard ~T in the abdomen [Pedal Pulses Present] : the pedal pulses are present [No Edema] : there was no peripheral edema [No Palpable Aorta] : no palpable aorta [Soft] : abdomen soft [Non Tender] : non-tender [No HSM] : no HSM [Normal Axillary Nodes] : no axillary lymphadenopathy [Normal Posterior Cervical Nodes] : no posterior cervical lymphadenopathy [Normal Anterior Cervical Nodes] : no anterior cervical lymphadenopathy [Normal Inguinal Nodes] : no inguinal lymphadenopathy [Coordination Grossly Intact] : coordination grossly intact [No Focal Deficits] : no focal deficits [Normal Gait] : normal gait [Speech Grossly Normal] : speech grossly normal [Memory Grossly Normal] : memory grossly normal [Alert and Oriented x3] : oriented to person, place, and time [de-identified] : Several scattered areas of red, slightly raised, mildly excoriated small patches with central puncture wound, consistent with an insect bite. [de-identified] : Less depressed and anxious

## 2021-05-02 NOTE — HISTORY OF PRESENT ILLNESS
[FreeTextEntry1] : Followup of depression/anxiety and obsessive thoughts. [de-identified] : Patient presents for followup of depressive/anxiety symptoms and obsessive thoughts. Presently on sertraline 125 mg/day only. Was unable to tolerate paroxetine at doses greater than 10 mg/day. Was experiencing increased headaches, increased GI toxicity and worsening anxiety. Tolerating sertraline 125 mg/day, and will be maintaining per psychiatry. Using lorazepam in place of clonazepam with improved anxiety/initial insomnia control. Adjunctive therapy of olanzapine 2.5 mg HS, tolerated well. Patient reports increase in appetite and weight. Also, reports no nausea since starting olanzapine. Patient had first appointment with psychiatry-Pranay Lebron MD-and patient reports session went well. Scheduled to have another appointment next week. Consideration was given to CBT, and psychiatry will be discussing with the patient's therapist, the best program for the patient. Overall, continues to improve and feels is now making steady improvement. Tolerating all medications well. Patient remains positive, and looking forward to further therapy. Continues to deny any suicidal or homicidal ideations. Meets with therapist weekly. In addition, continues to work on a regular work schedule. Also, has developed areas of intense pruritus over torso, extremities and neck. No prior history of similar outbreak. Seemed to appear overnight. Has been using no topical medication to improve itch. Feels they may be "bug bites". Case discussed by telephone with both therapist-Catalina Freire, and psychiatrist-Pranay Lebron MD, within the last week.

## 2021-05-06 ENCOUNTER — APPOINTMENT (OUTPATIENT)
Dept: PSYCHIATRY | Facility: CLINIC | Age: 36
End: 2021-05-06
Payer: COMMERCIAL

## 2021-05-06 PROCEDURE — 90791 PSYCH DIAGNOSTIC EVALUATION: CPT | Mod: 95

## 2021-05-09 ENCOUNTER — APPOINTMENT (OUTPATIENT)
Dept: FAMILY MEDICINE | Facility: CLINIC | Age: 36
End: 2021-05-09

## 2021-05-13 ENCOUNTER — APPOINTMENT (OUTPATIENT)
Dept: PSYCHIATRY | Facility: CLINIC | Age: 36
End: 2021-05-13
Payer: COMMERCIAL

## 2021-05-13 PROCEDURE — 90837 PSYTX W PT 60 MINUTES: CPT | Mod: 95

## 2021-05-17 ENCOUNTER — NON-APPOINTMENT (OUTPATIENT)
Age: 36
End: 2021-05-17

## 2021-05-20 ENCOUNTER — NON-APPOINTMENT (OUTPATIENT)
Age: 36
End: 2021-05-20

## 2021-05-21 ENCOUNTER — NON-APPOINTMENT (OUTPATIENT)
Age: 36
End: 2021-05-21

## 2021-05-21 ENCOUNTER — APPOINTMENT (OUTPATIENT)
Dept: OBGYN | Facility: CLINIC | Age: 36
End: 2021-05-21
Payer: COMMERCIAL

## 2021-05-21 VITALS
HEIGHT: 66 IN | SYSTOLIC BLOOD PRESSURE: 120 MMHG | DIASTOLIC BLOOD PRESSURE: 80 MMHG | TEMPERATURE: 98.7 F | WEIGHT: 125 LBS | BODY MASS INDEX: 20.09 KG/M2

## 2021-05-21 DIAGNOSIS — R31.9 URINARY TRACT INFECTION, SITE NOT SPECIFIED: ICD-10-CM

## 2021-05-21 DIAGNOSIS — N39.0 URINARY TRACT INFECTION, SITE NOT SPECIFIED: ICD-10-CM

## 2021-05-21 DIAGNOSIS — N98.1 HYPERSTIMULATION OF OVARIES: ICD-10-CM

## 2021-05-21 LAB
BILIRUB UR QL STRIP: NORMAL
GLUCOSE UR-MCNC: NORMAL
HCG UR QL: 0.2 EU/DL
HGB UR QL STRIP.AUTO: NORMAL
KETONES UR-MCNC: NORMAL
LEUKOCYTE ESTERASE UR QL STRIP: NORMAL
NITRITE UR QL STRIP: NORMAL
PH UR STRIP: 5.5
PROT UR STRIP-MCNC: NORMAL
SP GR UR STRIP: 1.03

## 2021-05-21 PROCEDURE — 81003 URINALYSIS AUTO W/O SCOPE: CPT | Mod: QW

## 2021-05-21 PROCEDURE — 99072 ADDL SUPL MATRL&STAF TM PHE: CPT

## 2021-05-21 PROCEDURE — 99395 PREV VISIT EST AGE 18-39: CPT

## 2021-05-21 NOTE — HISTORY OF PRESENT ILLNESS
[TextBox_4] : 34yo  here for annual gyn. 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 She has reg periods on Kariva which she takes for her skin b/c she has acne otherwise. \par Started going to Dr. Claros for egg freezing.   She had OHSS in 3/2021- estradiol level went to 7000 per pt.  She did not need hospitalization but had ascites, abd pain, etc.  Eventually lost weight and needed Zoloft and Zyprexa to gain it back and stabilize her moods.  She is still on it now.  Now back on kariva after retrieving 22 eggs.\par She also has a h/o C. dif she got from work and needed a fecal transplant at [facility] b/c she also has UC.\par She has a h/o ectopic pregn- treated with R salpingectomy years ago in [place] - they converted to expl lap but she doesn't know why. She has had an HSG and patent L fallopian tube but is nervous about getting pregnant when she tries in the near future.\par \par She recently broke up with  long term boyfriend b/c he didn't want to do IVF.  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  \par

## 2021-05-24 ENCOUNTER — RESULT REVIEW (OUTPATIENT)
Age: 36
End: 2021-05-24

## 2021-05-24 ENCOUNTER — NON-APPOINTMENT (OUTPATIENT)
Age: 36
End: 2021-05-24

## 2021-05-28 LAB
APPEARANCE: ABNORMAL
BACTERIA UR CULT: NORMAL
BACTERIA: NEGATIVE
BILIRUBIN URINE: NEGATIVE
BLOOD URINE: ABNORMAL
COLOR: YELLOW
CYTOLOGY CVX/VAG DOC THIN PREP: NORMAL
GLUCOSE QUALITATIVE U: NEGATIVE
HPV HIGH+LOW RISK DNA PNL CVX: NOT DETECTED
HYALINE CASTS: 8 /LPF
KETONES URINE: NEGATIVE
LEUKOCYTE ESTERASE URINE: ABNORMAL
MICROSCOPIC-UA: NORMAL
NITRITE URINE: NEGATIVE
PH URINE: 5.5
PROTEIN URINE: NORMAL
RED BLOOD CELLS URINE: 8 /HPF
SPECIFIC GRAVITY URINE: 1.03
SQUAMOUS EPITHELIAL CELLS: 24 /HPF
URINE COMMENTS: NORMAL
UROBILINOGEN URINE: NORMAL
WHITE BLOOD CELLS URINE: 136 /HPF

## 2021-06-02 ENCOUNTER — APPOINTMENT (OUTPATIENT)
Dept: OBGYN | Facility: CLINIC | Age: 36
End: 2021-06-02

## 2021-06-14 ENCOUNTER — APPOINTMENT (OUTPATIENT)
Dept: FAMILY MEDICINE | Facility: CLINIC | Age: 36
End: 2021-06-14
Payer: COMMERCIAL

## 2021-06-14 VITALS
HEIGHT: 72 IN | DIASTOLIC BLOOD PRESSURE: 60 MMHG | OXYGEN SATURATION: 99 % | HEART RATE: 102 BPM | TEMPERATURE: 99 F | BODY MASS INDEX: 17.2 KG/M2 | SYSTOLIC BLOOD PRESSURE: 110 MMHG | RESPIRATION RATE: 17 BRPM | WEIGHT: 127 LBS

## 2021-06-14 PROCEDURE — 99213 OFFICE O/P EST LOW 20 MIN: CPT | Mod: 25

## 2021-06-14 PROCEDURE — 99072 ADDL SUPL MATRL&STAF TM PHE: CPT

## 2021-06-14 PROCEDURE — 87880 STREP A ASSAY W/OPTIC: CPT | Mod: QW

## 2021-06-16 DIAGNOSIS — R07.89 OTHER CHEST PAIN: ICD-10-CM

## 2021-06-16 DIAGNOSIS — R09.81 NASAL CONGESTION: ICD-10-CM

## 2021-06-16 LAB — SARS-COV-2 N GENE NPH QL NAA+PROBE: NOT DETECTED

## 2021-06-16 RX ORDER — FLUTICASONE PROPIONATE 50 UG/1
50 SPRAY, METERED NASAL DAILY
Qty: 1 | Refills: 1 | Status: ACTIVE | COMMUNITY
Start: 2021-06-16 | End: 1900-01-01

## 2021-06-17 LAB — S PYO AG SPEC QL IA: NORMAL

## 2021-06-17 NOTE — HISTORY OF PRESENT ILLNESS
[FreeTextEntry1] : Nasal congestion, sore throat and dry cough x3 days [de-identified] : Patient presents with history x3 days of nasal congestion, sore throat and nonproductive cough. No fever or chills. Sputum is clear. No wheezing or dyspnea.Nonsmoker. Fully vaccinated against COVID-19.

## 2021-06-17 NOTE — PHYSICAL EXAM
[No Acute Distress] : no acute distress [Well Nourished] : well nourished [Well Developed] : well developed [Well-Appearing] : well-appearing [Normal Voice/Communication] : normal voice/communication [Normal Sclera/Conjunctiva] : normal sclera/conjunctiva [PERRL] : pupils equal round and reactive to light [EOMI] : extraocular movements intact [Normal Outer Ear/Nose] : the outer ears and nose were normal in appearance [Normal TMs] : both tympanic membranes were normal [No JVD] : no jugular venous distention [No Lymphadenopathy] : no lymphadenopathy [Supple] : supple [No Respiratory Distress] : no respiratory distress  [Clear to Auscultation] : lungs were clear to auscultation bilaterally [Normal Rate] : normal rate  [Regular Rhythm] : with a regular rhythm [Normal S1, S2] : normal S1 and S2 [No Murmur] : no murmur heard [No Carotid Bruits] : no carotid bruits [Pedal Pulses Present] : the pedal pulses are present [No Edema] : there was no peripheral edema [Soft] : abdomen soft [Non Tender] : non-tender [No HSM] : no HSM [Normal Axillary Nodes] : no axillary lymphadenopathy [Normal Posterior Cervical Nodes] : no posterior cervical lymphadenopathy [Normal Anterior Cervical Nodes] : no anterior cervical lymphadenopathy [Normal Inguinal Nodes] : no inguinal lymphadenopathy [Coordination Grossly Intact] : coordination grossly intact [No Focal Deficits] : no focal deficits [Normal Gait] : normal gait [Speech Grossly Normal] : speech grossly normal [Memory Grossly Normal] : memory grossly normal [Alert and Oriented x3] : oriented to person, place, and time [No Accessory Muscle Use] : no accessory muscle use [Normal Percussion] : the chest was normal to percussion [No Rash] : no rash [de-identified] : Trace last year her pharynx injection without petechiae or exudate, mild nasal congestion with clear discharge and postnasal drip [de-identified] : Less depressed and anxious

## 2021-06-21 ENCOUNTER — NON-APPOINTMENT (OUTPATIENT)
Age: 36
End: 2021-06-21

## 2021-06-21 LAB — BACTERIA THROAT CULT: NORMAL

## 2021-06-22 ENCOUNTER — TRANSCRIPTION ENCOUNTER (OUTPATIENT)
Age: 36
End: 2021-06-22

## 2021-06-28 ENCOUNTER — NON-APPOINTMENT (OUTPATIENT)
Age: 36
End: 2021-06-28

## 2021-06-30 ENCOUNTER — NON-APPOINTMENT (OUTPATIENT)
Age: 36
End: 2021-06-30

## 2021-07-01 ENCOUNTER — NON-APPOINTMENT (OUTPATIENT)
Age: 36
End: 2021-07-01

## 2021-07-07 ENCOUNTER — APPOINTMENT (OUTPATIENT)
Dept: PSYCHIATRY | Facility: CLINIC | Age: 36
End: 2021-07-07
Payer: COMMERCIAL

## 2021-07-07 PROCEDURE — 90791 PSYCH DIAGNOSTIC EVALUATION: CPT | Mod: 95

## 2021-07-12 ENCOUNTER — APPOINTMENT (OUTPATIENT)
Dept: PSYCHIATRY | Facility: CLINIC | Age: 36
End: 2021-07-12
Payer: COMMERCIAL

## 2021-07-12 ENCOUNTER — RX RENEWAL (OUTPATIENT)
Age: 36
End: 2021-07-12

## 2021-07-12 PROCEDURE — 90834 PSYTX W PT 45 MINUTES: CPT | Mod: 95

## 2021-07-14 ENCOUNTER — RX RENEWAL (OUTPATIENT)
Age: 36
End: 2021-07-14

## 2021-07-16 ENCOUNTER — RX RENEWAL (OUTPATIENT)
Age: 36
End: 2021-07-16

## 2021-07-19 ENCOUNTER — APPOINTMENT (OUTPATIENT)
Dept: PSYCHIATRY | Facility: CLINIC | Age: 36
End: 2021-07-19
Payer: COMMERCIAL

## 2021-07-19 ENCOUNTER — APPOINTMENT (OUTPATIENT)
Dept: OBGYN | Facility: CLINIC | Age: 36
End: 2021-07-19
Payer: COMMERCIAL

## 2021-07-19 DIAGNOSIS — Z11.3 ENCOUNTER FOR SCREENING FOR INFECTIONS WITH A PREDOMINANTLY SEXUAL MODE OF TRANSMISSION: ICD-10-CM

## 2021-07-19 DIAGNOSIS — N91.2 AMENORRHEA, UNSPECIFIED: ICD-10-CM

## 2021-07-19 PROCEDURE — 36415 COLL VENOUS BLD VENIPUNCTURE: CPT

## 2021-07-19 PROCEDURE — 99213 OFFICE O/P EST LOW 20 MIN: CPT

## 2021-07-19 PROCEDURE — 90834 PSYTX W PT 45 MINUTES: CPT | Mod: 95

## 2021-07-19 PROCEDURE — 99072 ADDL SUPL MATRL&STAF TM PHE: CPT

## 2021-07-20 LAB
C TRACH RRNA SPEC QL NAA+PROBE: NOT DETECTED
HBV SURFACE AG SER QL: NONREACTIVE
HCG SERPL-MCNC: <1 MIU/ML
HCV AB SER QL: NONREACTIVE
HCV S/CO RATIO: 0.11 S/CO
HIV1+2 AB SPEC QL IA.RAPID: NONREACTIVE
N GONORRHOEA RRNA SPEC QL NAA+PROBE: NOT DETECTED
SOURCE AMPLIFICATION: NORMAL

## 2021-07-28 ENCOUNTER — NON-APPOINTMENT (OUTPATIENT)
Age: 36
End: 2021-07-28

## 2021-07-28 ENCOUNTER — APPOINTMENT (OUTPATIENT)
Dept: PSYCHIATRY | Facility: CLINIC | Age: 36
End: 2021-07-28
Payer: COMMERCIAL

## 2021-07-28 LAB
HSV 1+2 IGG SER IA-IMP: NEGATIVE
HSV 1+2 IGG SER IA-IMP: POSITIVE
HSV1 IGG SER QL: 4.31 INDEX
HSV1 IGM SER QL: NEGATIVE
HSV2 AB FLD-ACNC: NEGATIVE
HSV2 IGG SER QL: 0.09 INDEX

## 2021-07-28 PROCEDURE — 90837 PSYTX W PT 60 MINUTES: CPT | Mod: 95

## 2021-08-02 ENCOUNTER — APPOINTMENT (OUTPATIENT)
Dept: PSYCHIATRY | Facility: CLINIC | Age: 36
End: 2021-08-02
Payer: COMMERCIAL

## 2021-08-02 PROCEDURE — 90837 PSYTX W PT 60 MINUTES: CPT | Mod: 95

## 2021-08-09 ENCOUNTER — APPOINTMENT (OUTPATIENT)
Dept: PSYCHIATRY | Facility: CLINIC | Age: 36
End: 2021-08-09
Payer: COMMERCIAL

## 2021-08-09 PROCEDURE — 90834 PSYTX W PT 45 MINUTES: CPT | Mod: 95

## 2021-08-23 ENCOUNTER — APPOINTMENT (OUTPATIENT)
Dept: PSYCHIATRY | Facility: CLINIC | Age: 36
End: 2021-08-23
Payer: COMMERCIAL

## 2021-08-23 PROCEDURE — 90834 PSYTX W PT 45 MINUTES: CPT | Mod: 95

## 2021-08-30 ENCOUNTER — APPOINTMENT (OUTPATIENT)
Dept: PSYCHIATRY | Facility: CLINIC | Age: 36
End: 2021-08-30
Payer: COMMERCIAL

## 2021-08-30 PROCEDURE — 90837 PSYTX W PT 60 MINUTES: CPT | Mod: 95

## 2021-09-13 ENCOUNTER — APPOINTMENT (OUTPATIENT)
Dept: PSYCHIATRY | Facility: CLINIC | Age: 36
End: 2021-09-13
Payer: COMMERCIAL

## 2021-09-13 PROCEDURE — 90837 PSYTX W PT 60 MINUTES: CPT | Mod: 95

## 2021-09-16 ENCOUNTER — APPOINTMENT (OUTPATIENT)
Dept: FAMILY MEDICINE | Facility: CLINIC | Age: 36
End: 2021-09-16
Payer: COMMERCIAL

## 2021-09-16 VITALS
DIASTOLIC BLOOD PRESSURE: 68 MMHG | TEMPERATURE: 98.5 F | RESPIRATION RATE: 14 BRPM | HEART RATE: 83 BPM | WEIGHT: 132 LBS | SYSTOLIC BLOOD PRESSURE: 106 MMHG | BODY MASS INDEX: 21.21 KG/M2 | HEIGHT: 66 IN | OXYGEN SATURATION: 99 %

## 2021-09-16 DIAGNOSIS — F32.2 MAJOR DEPRESSIVE DISORDER, SINGLE EPISODE, SEVERE W/OUT PSYCHOTIC FEATURES: ICD-10-CM

## 2021-09-16 DIAGNOSIS — Z23 ENCOUNTER FOR IMMUNIZATION: ICD-10-CM

## 2021-09-16 PROCEDURE — 90471 IMMUNIZATION ADMIN: CPT

## 2021-09-16 PROCEDURE — 36415 COLL VENOUS BLD VENIPUNCTURE: CPT

## 2021-09-16 PROCEDURE — 90686 IIV4 VACC NO PRSV 0.5 ML IM: CPT

## 2021-09-16 PROCEDURE — 99214 OFFICE O/P EST MOD 30 MIN: CPT | Mod: 25

## 2021-09-19 LAB
25(OH)D3 SERPL-MCNC: 59.7 NG/ML
ALBUMIN SERPL ELPH-MCNC: 4.4 G/DL
ALP BLD-CCNC: 83 U/L
ALT SERPL-CCNC: 20 U/L
ANION GAP SERPL CALC-SCNC: 12 MMOL/L
AST SERPL-CCNC: 21 U/L
BASOPHILS # BLD AUTO: 0.02 K/UL
BASOPHILS NFR BLD AUTO: 0.3 %
BILIRUB SERPL-MCNC: 0.4 MG/DL
BUN SERPL-MCNC: 11 MG/DL
CALCIUM SERPL-MCNC: 9.8 MG/DL
CHLORIDE SERPL-SCNC: 102 MMOL/L
CHOLEST SERPL-MCNC: 248 MG/DL
CO2 SERPL-SCNC: 23 MMOL/L
CREAT SERPL-MCNC: 0.63 MG/DL
EOSINOPHIL # BLD AUTO: 0.07 K/UL
EOSINOPHIL NFR BLD AUTO: 1.1 %
ESTIMATED AVERAGE GLUCOSE: 108 MG/DL
FOLATE SERPL-MCNC: >20 NG/ML
GLUCOSE SERPL-MCNC: 93 MG/DL
HBA1C MFR BLD HPLC: 5.4 %
HCT VFR BLD CALC: 44.5 %
HDLC SERPL-MCNC: 46 MG/DL
HGB BLD-MCNC: 14.1 G/DL
IMM GRANULOCYTES NFR BLD AUTO: 0.3 %
IRON SATN MFR SERPL: 27 %
IRON SERPL-MCNC: 97 UG/DL
LDLC SERPL CALC-MCNC: 169 MG/DL
LYMPHOCYTES # BLD AUTO: 2.17 K/UL
LYMPHOCYTES NFR BLD AUTO: 34.2 %
MAGNESIUM SERPL-MCNC: 2.3 MG/DL
MAN DIFF?: NORMAL
MCHC RBC-ENTMCNC: 30.6 PG
MCHC RBC-ENTMCNC: 31.7 GM/DL
MCV RBC AUTO: 96.5 FL
MONOCYTES # BLD AUTO: 0.62 K/UL
MONOCYTES NFR BLD AUTO: 9.8 %
NEUTROPHILS # BLD AUTO: 3.44 K/UL
NEUTROPHILS NFR BLD AUTO: 54.3 %
NONHDLC SERPL-MCNC: 202 MG/DL
PLATELET # BLD AUTO: 288 K/UL
POTASSIUM SERPL-SCNC: 4.1 MMOL/L
PROT SERPL-MCNC: 7.1 G/DL
RBC # BLD: 4.61 M/UL
RBC # FLD: 12.3 %
SODIUM SERPL-SCNC: 137 MMOL/L
T4 FREE SERPL-MCNC: 1 NG/DL
TIBC SERPL-MCNC: 354 UG/DL
TRIGL SERPL-MCNC: 168 MG/DL
TSH SERPL-ACNC: 1.61 UIU/ML
UIBC SERPL-MCNC: 257 UG/DL
VIT B12 SERPL-MCNC: 548 PG/ML
WBC # FLD AUTO: 6.34 K/UL

## 2021-09-20 ENCOUNTER — APPOINTMENT (OUTPATIENT)
Dept: PSYCHIATRY | Facility: CLINIC | Age: 36
End: 2021-09-20
Payer: COMMERCIAL

## 2021-09-20 PROCEDURE — 90847 FAMILY PSYTX W/PT 50 MIN: CPT | Mod: 95

## 2021-09-22 PROBLEM — F32.2 CURRENT SEVERE EPISODE OF MAJOR DEPRESSIVE DISORDER WITHOUT PSYCHOTIC FEATURES, UNSPECIFIED WHETHER RECURRENT: Status: ACTIVE | Noted: 2021-03-26

## 2021-09-22 PROBLEM — Z23 ENCOUNTER FOR IMMUNIZATION: Status: ACTIVE | Noted: 2019-09-08

## 2021-09-22 NOTE — PHYSICAL EXAM
[No Acute Distress] : no acute distress [Well Nourished] : well nourished [Well Developed] : well developed [Well-Appearing] : well-appearing [Normal Voice/Communication] : normal voice/communication [Normal Sclera/Conjunctiva] : normal sclera/conjunctiva [PERRL] : pupils equal round and reactive to light [EOMI] : extraocular movements intact [No JVD] : no jugular venous distention [Supple] : supple [No Respiratory Distress] : no respiratory distress  [Clear to Auscultation] : lungs were clear to auscultation bilaterally [Normal Rate] : normal rate  [Regular Rhythm] : with a regular rhythm [Normal S1, S2] : normal S1 and S2 [No Murmur] : no murmur heard [No Carotid Bruits] : no carotid bruits [Pedal Pulses Present] : the pedal pulses are present [No Edema] : there was no peripheral edema [Soft] : abdomen soft [Non Tender] : non-tender [No HSM] : no HSM [Normal Axillary Nodes] : no axillary lymphadenopathy [Normal Posterior Cervical Nodes] : no posterior cervical lymphadenopathy [Normal Anterior Cervical Nodes] : no anterior cervical lymphadenopathy [Normal Inguinal Nodes] : no inguinal lymphadenopathy [No CVA Tenderness] : no CVA  tenderness [No Spinal Tenderness] : no spinal tenderness [No Joint Swelling] : no joint swelling [Grossly Normal Strength/Tone] : grossly normal strength/tone [No Rash] : no rash [Coordination Grossly Intact] : coordination grossly intact [No Focal Deficits] : no focal deficits [Normal Gait] : normal gait [Speech Grossly Normal] : speech grossly normal [Normal Affect] : the affect was normal [Alert and Oriented x3] : oriented to person, place, and time [Normal Insight/Judgement] : insight and judgment were intact

## 2021-09-22 NOTE — REVIEW OF SYSTEMS
[Recent Change In Weight] : ~T recent weight change [Negative] : Heme/Lymph [Chest Pain] : no chest pain [Palpitations] : no palpitations [Dyspnea on Exertion] : no dyspnea on exertion [Skin Rash] : no skin rash [Swollen Glands] : no swollen glands [FreeTextEntry2] : Weight increase

## 2021-09-22 NOTE — HISTORY OF PRESENT ILLNESS
[FreeTextEntry1] : Followup of chronic medical conditions, maintenance laboratory testing an influenza immunization. [de-identified] : Patient presents for followup of depressive/anxiety symptoms and obsessive thoughts. He continues to see psychiatry on a regular basis and is undergoing behavioral health counseling. Presently on sertraline 200 mg/day only. Has stopped adjunctive therapy-olanzapine 2.5 mg/day. Tolerating well. Symptoms reasonably well controlled on present medication regimen. Needs maintenance laboratory testing, and influenza immunization.

## 2021-09-26 ENCOUNTER — RESULT REVIEW (OUTPATIENT)
Age: 36
End: 2021-09-26

## 2021-09-27 ENCOUNTER — APPOINTMENT (OUTPATIENT)
Dept: PSYCHIATRY | Facility: CLINIC | Age: 36
End: 2021-09-27
Payer: COMMERCIAL

## 2021-09-27 PROCEDURE — 90847 FAMILY PSYTX W/PT 50 MIN: CPT | Mod: 95

## 2021-10-04 ENCOUNTER — APPOINTMENT (OUTPATIENT)
Dept: PSYCHIATRY | Facility: CLINIC | Age: 36
End: 2021-10-04
Payer: COMMERCIAL

## 2021-10-04 PROCEDURE — 90837 PSYTX W PT 60 MINUTES: CPT | Mod: 95

## 2021-10-11 ENCOUNTER — APPOINTMENT (OUTPATIENT)
Dept: PSYCHIATRY | Facility: CLINIC | Age: 36
End: 2021-10-11
Payer: COMMERCIAL

## 2021-10-11 PROCEDURE — 90837 PSYTX W PT 60 MINUTES: CPT | Mod: 95

## 2021-10-18 ENCOUNTER — APPOINTMENT (OUTPATIENT)
Dept: FAMILY MEDICINE | Facility: CLINIC | Age: 36
End: 2021-10-18

## 2021-10-18 ENCOUNTER — APPOINTMENT (OUTPATIENT)
Dept: PSYCHIATRY | Facility: CLINIC | Age: 36
End: 2021-10-18
Payer: COMMERCIAL

## 2021-10-18 PROCEDURE — 90837 PSYTX W PT 60 MINUTES: CPT | Mod: 95

## 2021-10-25 ENCOUNTER — APPOINTMENT (OUTPATIENT)
Dept: PSYCHIATRY | Facility: CLINIC | Age: 36
End: 2021-10-25
Payer: COMMERCIAL

## 2021-10-25 PROCEDURE — 90837 PSYTX W PT 60 MINUTES: CPT | Mod: 95

## 2021-10-31 ENCOUNTER — TRANSCRIPTION ENCOUNTER (OUTPATIENT)
Age: 36
End: 2021-10-31

## 2021-11-09 ENCOUNTER — APPOINTMENT (OUTPATIENT)
Dept: PSYCHIATRY | Facility: CLINIC | Age: 36
End: 2021-11-09
Payer: COMMERCIAL

## 2021-11-09 PROCEDURE — 90834 PSYTX W PT 45 MINUTES: CPT | Mod: 95

## 2021-11-12 ENCOUNTER — NON-APPOINTMENT (OUTPATIENT)
Age: 36
End: 2021-11-12

## 2021-11-15 ENCOUNTER — APPOINTMENT (OUTPATIENT)
Dept: PSYCHIATRY | Facility: CLINIC | Age: 36
End: 2021-11-15
Payer: COMMERCIAL

## 2021-11-15 PROCEDURE — 90837 PSYTX W PT 60 MINUTES: CPT | Mod: 95

## 2021-11-26 ENCOUNTER — RX RENEWAL (OUTPATIENT)
Age: 36
End: 2021-11-26

## 2021-11-30 ENCOUNTER — APPOINTMENT (OUTPATIENT)
Dept: PSYCHIATRY | Facility: CLINIC | Age: 36
End: 2021-11-30
Payer: COMMERCIAL

## 2021-11-30 PROCEDURE — 90837 PSYTX W PT 60 MINUTES: CPT | Mod: 95

## 2021-12-09 ENCOUNTER — APPOINTMENT (OUTPATIENT)
Dept: PSYCHIATRY | Facility: CLINIC | Age: 36
End: 2021-12-09
Payer: COMMERCIAL

## 2021-12-09 PROCEDURE — 90837 PSYTX W PT 60 MINUTES: CPT | Mod: 95

## 2021-12-14 ENCOUNTER — APPOINTMENT (OUTPATIENT)
Dept: PSYCHIATRY | Facility: CLINIC | Age: 36
End: 2021-12-14

## 2021-12-21 ENCOUNTER — APPOINTMENT (OUTPATIENT)
Dept: PSYCHIATRY | Facility: CLINIC | Age: 36
End: 2021-12-21
Payer: COMMERCIAL

## 2021-12-21 PROCEDURE — 90837 PSYTX W PT 60 MINUTES: CPT | Mod: 95

## 2021-12-28 ENCOUNTER — APPOINTMENT (OUTPATIENT)
Dept: PSYCHIATRY | Facility: CLINIC | Age: 36
End: 2021-12-28
Payer: COMMERCIAL

## 2021-12-28 PROCEDURE — 90837 PSYTX W PT 60 MINUTES: CPT | Mod: 95

## 2022-01-10 ENCOUNTER — RESULT REVIEW (OUTPATIENT)
Age: 37
End: 2022-01-10

## 2022-01-12 ENCOUNTER — APPOINTMENT (OUTPATIENT)
Dept: PSYCHIATRY | Facility: CLINIC | Age: 37
End: 2022-01-12
Payer: COMMERCIAL

## 2022-01-12 PROCEDURE — 90837 PSYTX W PT 60 MINUTES: CPT | Mod: 95

## 2022-01-24 ENCOUNTER — APPOINTMENT (OUTPATIENT)
Dept: FAMILY MEDICINE | Facility: CLINIC | Age: 37
End: 2022-01-24
Payer: COMMERCIAL

## 2022-01-24 ENCOUNTER — NON-APPOINTMENT (OUTPATIENT)
Age: 37
End: 2022-01-24

## 2022-01-24 ENCOUNTER — LABORATORY RESULT (OUTPATIENT)
Age: 37
End: 2022-01-24

## 2022-01-24 VITALS
HEIGHT: 66 IN | BODY MASS INDEX: 22.5 KG/M2 | SYSTOLIC BLOOD PRESSURE: 116 MMHG | DIASTOLIC BLOOD PRESSURE: 74 MMHG | WEIGHT: 140 LBS | TEMPERATURE: 97.5 F | RESPIRATION RATE: 14 BRPM | OXYGEN SATURATION: 99 % | HEART RATE: 82 BPM

## 2022-01-24 DIAGNOSIS — Z11.1 ENCOUNTER FOR SCREENING FOR RESPIRATORY TUBERCULOSIS: ICD-10-CM

## 2022-01-24 DIAGNOSIS — Z11.4 ENCOUNTER FOR SCREENING FOR HUMAN IMMUNODEFICIENCY VIRUS [HIV]: ICD-10-CM

## 2022-01-24 DIAGNOSIS — Z01.84 ENCOUNTER FOR ANTIBODY RESPONSE EXAMINATION: ICD-10-CM

## 2022-01-24 DIAGNOSIS — F32.9 MAJOR DEPRESSIVE DISORDER, SINGLE EPISODE, UNSPECIFIED: ICD-10-CM

## 2022-01-24 PROCEDURE — 99214 OFFICE O/P EST MOD 30 MIN: CPT | Mod: 25

## 2022-01-24 PROCEDURE — 82270 OCCULT BLOOD FECES: CPT

## 2022-01-24 PROCEDURE — 36415 COLL VENOUS BLD VENIPUNCTURE: CPT

## 2022-01-24 PROCEDURE — 99173 VISUAL ACUITY SCREEN: CPT | Mod: 59

## 2022-01-24 PROCEDURE — 99395 PREV VISIT EST AGE 18-39: CPT | Mod: 25

## 2022-01-24 PROCEDURE — 92552 PURE TONE AUDIOMETRY AIR: CPT

## 2022-01-24 PROCEDURE — 93000 ELECTROCARDIOGRAM COMPLETE: CPT

## 2022-01-25 ENCOUNTER — APPOINTMENT (OUTPATIENT)
Dept: PSYCHIATRY | Facility: CLINIC | Age: 37
End: 2022-01-25
Payer: COMMERCIAL

## 2022-01-25 PROCEDURE — 90837 PSYTX W PT 60 MINUTES: CPT | Mod: 95

## 2022-01-26 LAB
25(OH)D3 SERPL-MCNC: 45 NG/ML
ALBUMIN SERPL ELPH-MCNC: 4.5 G/DL
ALP BLD-CCNC: 93 U/L
ALT SERPL-CCNC: 21 U/L
ANION GAP SERPL CALC-SCNC: 13 MMOL/L
APPEARANCE: ABNORMAL
AST SERPL-CCNC: 21 U/L
BASOPHILS # BLD AUTO: 0.03 K/UL
BASOPHILS NFR BLD AUTO: 0.3 %
BILIRUB SERPL-MCNC: 0.6 MG/DL
BILIRUBIN URINE: NEGATIVE
BLOOD URINE: ABNORMAL
BUN SERPL-MCNC: 11 MG/DL
CALCIUM SERPL-MCNC: 9.8 MG/DL
CHLORIDE SERPL-SCNC: 98 MMOL/L
CHOLEST SERPL-MCNC: 236 MG/DL
CO2 SERPL-SCNC: 24 MMOL/L
COLOR: YELLOW
CREAT SERPL-MCNC: 0.61 MG/DL
EOSINOPHIL # BLD AUTO: 0.02 K/UL
EOSINOPHIL NFR BLD AUTO: 0.2 %
ESTIMATED AVERAGE GLUCOSE: 105 MG/DL
FOLATE SERPL-MCNC: >20 NG/ML
GLUCOSE QUALITATIVE U: NEGATIVE
GLUCOSE SERPL-MCNC: 84 MG/DL
HBA1C MFR BLD HPLC: 5.3 %
HBV SURFACE AB SERPL IA-ACNC: 8.8 MIU/ML
HBV SURFACE AG SER QL: NONREACTIVE
HCT VFR BLD CALC: 41.6 %
HCV AB SER QL: NONREACTIVE
HCV S/CO RATIO: 0.09 S/CO
HDLC SERPL-MCNC: 44 MG/DL
HGB BLD-MCNC: 13.6 G/DL
HIV1+2 AB SPEC QL IA.RAPID: NONREACTIVE
IMM GRANULOCYTES NFR BLD AUTO: 0.3 %
IRON SATN MFR SERPL: 30 %
IRON SERPL-MCNC: 104 UG/DL
KETONES URINE: NEGATIVE
LDLC SERPL CALC-MCNC: 166 MG/DL
LEUKOCYTE ESTERASE URINE: ABNORMAL
LYMPHOCYTES # BLD AUTO: 2.84 K/UL
LYMPHOCYTES NFR BLD AUTO: 31.5 %
MAGNESIUM SERPL-MCNC: 2.2 MG/DL
MAN DIFF?: NORMAL
MCHC RBC-ENTMCNC: 30.9 PG
MCHC RBC-ENTMCNC: 32.7 GM/DL
MCV RBC AUTO: 94.5 FL
MEV IGG FLD QL IA: 118 AU/ML
MEV IGG+IGM SER-IMP: POSITIVE
MONOCYTES # BLD AUTO: 0.78 K/UL
MONOCYTES NFR BLD AUTO: 8.6 %
MUV AB SER-ACNC: POSITIVE
MUV IGG SER QL IA: 85.2 AU/ML
NEUTROPHILS # BLD AUTO: 5.33 K/UL
NEUTROPHILS NFR BLD AUTO: 59.1 %
NITRITE URINE: NEGATIVE
NONHDLC SERPL-MCNC: 192 MG/DL
PH URINE: 6
PLATELET # BLD AUTO: 249 K/UL
POTASSIUM SERPL-SCNC: 3.8 MMOL/L
PROT SERPL-MCNC: 7.1 G/DL
PROTEIN URINE: ABNORMAL
RBC # BLD: 4.4 M/UL
RBC # FLD: 12.4 %
RUBV IGG FLD-ACNC: 3.8 INDEX
RUBV IGG SER-IMP: POSITIVE
SODIUM SERPL-SCNC: 135 MMOL/L
SPECIFIC GRAVITY URINE: 1.02
TIBC SERPL-MCNC: 343 UG/DL
TRIGL SERPL-MCNC: 128 MG/DL
TSH SERPL-ACNC: 1.76 UIU/ML
UIBC SERPL-MCNC: 239 UG/DL
URATE SERPL-MCNC: 4.5 MG/DL
UROBILINOGEN URINE: NORMAL
VIT B12 SERPL-MCNC: 520 PG/ML
VZV AB TITR SER: POSITIVE
VZV IGG SER IF-ACNC: 1078 INDEX
WBC # FLD AUTO: 9.03 K/UL

## 2022-01-27 ENCOUNTER — NON-APPOINTMENT (OUTPATIENT)
Age: 37
End: 2022-01-27

## 2022-01-27 PROBLEM — Z11.4 SCREENING FOR HIV (HUMAN IMMUNODEFICIENCY VIRUS): Status: ACTIVE | Noted: 2020-01-02

## 2022-01-27 PROBLEM — Z01.84 IMMUNITY STATUS TESTING: Status: ACTIVE | Noted: 2020-02-19

## 2022-01-27 PROBLEM — Z11.1 SCREENING FOR TUBERCULOSIS: Status: ACTIVE | Noted: 2022-01-24

## 2022-01-27 PROBLEM — F32.9 MAJOR DEPRESSIVE DISORDER: Status: ACTIVE | Noted: 2021-05-14

## 2022-01-27 LAB
DATE COLLECTED: NORMAL
HEMOCCULT SP1 STL QL: NEGATIVE
M TB IFN-G BLD-IMP: NEGATIVE
QUALITY CONTROL: YES
QUANTIFERON TB PLUS MITOGEN MINUS NIL: 10 IU/ML
QUANTIFERON TB PLUS NIL: 0 IU/ML
QUANTIFERON TB PLUS TB1 MINUS NIL: 0 IU/ML
QUANTIFERON TB PLUS TB2 MINUS NIL: 0 IU/ML

## 2022-01-27 NOTE — HISTORY OF PRESENT ILLNESS
[FreeTextEntry1] : Complete Physical Examination [de-identified] : 36-year-old  female presents for Complete Physical Examination. Patient being treated for inflammatory bowel disease, major depressive disorder and generalized anxiety disorder. Followed by multiple medical specialists. Tolerating all prescription medications. Symptoms well-controlled on present medication regimen. Nonsmoker. No alcohol intake. Immunizations are up-to-date. All preventative services were reviewed in detail, and appear to be current for age. 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

## 2022-01-27 NOTE — HEALTH RISK ASSESSMENT
[Very Good] : ~his/her~  mood as very good [Intercurrent ED visits] : went to ED [Never] : Never [No] : In the past 12 months have you used drugs other than those required for medical reasons? No [1] : 1) Little interest or pleasure doing things for several days (1) [0] : 2) Feeling down, depressed, or hopeless: Not at all (0) [No Retinopathy] : No retinopathy [Patient reported PAP Smear was normal] : Patient reported PAP Smear was normal [Patient reported colonoscopy was abnormal] : Patient reported colonoscopy was abnormal [HIV Test offered] : HIV Test offered [Hepatitis C test offered] : Hepatitis C test offered [Alone] : lives alone [Employed] : employed [] :  [Feels Safe at Home] : Feels safe at home [TB Exposure] : is being exposed to tuberculosis [No falls in past year] : Patient reported no falls in the past year [None] : None [College] : College [Fully functional (bathing, dressing, toileting, transferring, walking, feeding)] : Fully functional (bathing, dressing, toileting, transferring, walking, feeding) [Fully functional (using the telephone, shopping, preparing meals, housekeeping, doing laundry, using] : Fully functional and needs no help or supervision to perform IADLs (using the telephone, shopping, preparing meals, housekeeping, doing laundry, using transportation, managing medications and managing finances) [Smoke Detector] : smoke detector [Carbon Monoxide Detector] : carbon monoxide detector [Guns at Home] : guns at home [Safety elements used in home] : safety elements used in home [Seat Belt] :  uses seat belt [Sunscreen] : uses sunscreen [Never (0 pts)] : Never (0 points) [Patient reported mammogram was normal] : Patient reported mammogram was normal [# Of Children ___] : has [unfilled] children [Sexually Active] : sexually active [Reports normal functional visual acuity (ie: able to read med bottle)] : Reports normal functional visual acuity [de-identified] : IBD fair ups [Audit-CScore] : 0 [de-identified] : Gym, walking, weights, stationery bike, yoga [[id number]] : 1 [EyeExamDate] : 01/22 [Change in mental status noted] : No change in mental status noted [Language] : denies difficulty with language [Behavior] : denies difficulty with behavior [Learning/Retaining New Information] : denies difficulty learning/retaining new information [Handling Complex Tasks] : denies difficulty handling complex tasks [Reasoning] : denies difficulty with reasoning [Spatial Ability and Orientation] : denies difficulty with spatial ability and orientation [High Risk Behavior] : no high risk behavior [Reports changes in hearing] : Reports no changes in hearing [Reports changes in vision] : Reports no changes in vision [Reports changes in dental health] : Reports no changes in dental health [Travel to Developing Areas] : does not  travel to developing areas [MammogramDate] : 12/20 [PapSmearDate] : 05/21 [BoneDensityComments] : NA [ColonoscopyDate] : 5/21 [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

## 2022-01-27 NOTE — REVIEW OF SYSTEMS
[Fatigue] : fatigue [Recent Change In Weight] : ~T recent weight change [Diarrhea] : diarrhea [Melena] : melasmita [Insomnia] : insomnia [Anxiety] : anxiety [Depression] : depression [Fever] : no fever [Chills] : no chills [Hot Flashes] : no hot flashes [Night Sweats] : no night sweats [Discharge] : no discharge [Pain] : no pain [Redness] : no redness [Dryness] : no dryness  [Vision Problems] : no vision problems [Itching] : no itching [Earache] : no earache [Hearing Loss] : no hearing loss [Nosebleed] : no nosebleeds [Hoarseness] : no hoarseness [Nasal Discharge] : no nasal discharge [Sore Throat] : no sore throat [Postnasal Drip] : no postnasal drip [Chest Pain] : no chest pain [Palpitations] : no palpitations [Leg Claudication] : no leg claudication [Lower Ext Edema] : no lower extremity edema [Orthopnea] : no orthopnea [Paroxysmal Nocturnal Dyspnea] : no paroxysmal nocturnal dyspnea [Shortness Of Breath] : no shortness of breath [Wheezing] : no wheezing [Cough] : no cough [Dyspnea on Exertion] : no dyspnea on exertion [Abdominal Pain] : no abdominal pain [Nausea] : no nausea [Constipation] : no constipation [Vomiting] : no vomiting [Heartburn] : no heartburn [Dysuria] : no dysuria [Incontinence] : no incontinence [Nocturia] : no nocturia [Poor Libido] : libido not poor [Hematuria] : no hematuria [Frequency] : no frequency [Vaginal Discharge] : no vaginal discharge [Dysmenorrhea] : no dysmenorrhea [Joint Pain] : no joint pain [Joint Stiffness] : no joint stiffness [Joint Swelling] : no joint swelling [Muscle Weakness] : no muscle weakness [Muscle Pain] : no muscle pain [Back Pain] : no back pain [Itching] : no itching [Mole Changes] : no mole changes [Nail Changes] : no nail changes [Hair Changes] : no hair changes [Skin Rash] : no skin rash [Headache] : no headache [Dizziness] : no dizziness [Fainting] : no fainting [Confusion] : no confusion [Memory Loss] : no memory loss [Unsteady Walking] : no ataxia [Suicidal] : not suicidal [Easy Bleeding] : no easy bleeding [Easy Bruising] : no easy bruising [Swollen Glands] : no swollen glands

## 2022-01-27 NOTE — PHYSICAL EXAM
[Snellen] : acuity screening with Snellen chart [No Acute Distress] : no acute distress [Well Nourished] : well nourished [Well Developed] : well developed [Well-Appearing] : well-appearing [Normal Voice/Communication] : normal voice/communication [Normal Sclera/Conjunctiva] : normal sclera/conjunctiva [PERRL] : pupils equal round and reactive to light [EOMI] : extraocular movements intact [20/___] : right eye 20/[unfilled] [Normal Outer Ear/Nose] : the outer ears and nose were normal in appearance [Normal Oropharynx] : the oropharynx was normal [No JVD] : no jugular venous distention [No Lymphadenopathy] : no lymphadenopathy [Supple] : supple [Thyroid Normal, No Nodules] : the thyroid was normal and there were no nodules present [No Respiratory Distress] : no respiratory distress  [No Accessory Muscle Use] : no accessory muscle use [Clear to Auscultation] : lungs were clear to auscultation bilaterally [Normal Rate] : normal rate  [Regular Rhythm] : with a regular rhythm [Normal S1, S2] : normal S1 and S2 [No Murmur] : no murmur heard [No Carotid Bruits] : no carotid bruits [No Abdominal Bruit] : a ~M bruit was not heard ~T in the abdomen [No Varicosities] : no varicosities [Pedal Pulses Present] : the pedal pulses are present [No Edema] : there was no peripheral edema [No Palpable Aorta] : no palpable aorta [No Extremity Clubbing/Cyanosis] : no extremity clubbing/cyanosis [Soft] : abdomen soft [Non Tender] : non-tender [Non-distended] : non-distended [No HSM] : no HSM [Normal Bowel Sounds] : normal bowel sounds [Normal Posterior Cervical Nodes] : no posterior cervical lymphadenopathy [Normal Anterior Cervical Nodes] : no anterior cervical lymphadenopathy [No CVA Tenderness] : no CVA  tenderness [No Spinal Tenderness] : no spinal tenderness [No Joint Swelling] : no joint swelling [Grossly Normal Strength/Tone] : grossly normal strength/tone [No Rash] : no rash [Coordination Grossly Intact] : coordination grossly intact [No Focal Deficits] : no focal deficits [Normal Gait] : normal gait [Deep Tendon Reflexes (DTR)] : deep tendon reflexes were 2+ and symmetric [Normal Affect] : the affect was normal [Normal Insight/Judgement] : insight and judgment were intact [Fundoscopic Exam Performed] : fundoscopic ~T exam ~C was performed [Normal TMs] : both tympanic membranes were normal [Normal Nasal Mucosa] : the nasal mucosa was normal [Normal Percussion] : the chest was normal to percussion [Normal Appearance] : normal in appearance [No Masses] : no palpable masses [No Nipple Discharge] : no nipple discharge [No Axillary Lymphadenopathy] : no axillary lymphadenopathy [Normal Sphincter Tone] : normal sphincter tone [No Mass] : no mass [External Female Genitalia] : normal external genitalia [Normal Supraclavicular Nodes] : no supraclavicular lymphadenopathy [Normal Axillary Nodes] : no axillary lymphadenopathy [Normal Inguinal Nodes] : no inguinal lymphadenopathy [Normal Femoral Nodes] : no femoral lymphadenopathy [No Skin Lesions] : no skin lesions [Speech Grossly Normal] : speech grossly normal [Memory Grossly Normal] : memory grossly normal [Alert and Oriented x3] : oriented to person, place, and time [Normal Mood] : the mood was normal [Peripheral Vision Was Full To Confrontation Bilaterally] : visual fields were full to confrontation bilaterally [Stool Occult Blood] : stool negative for occult blood [Kyphosis] : no kyphosis [Scoliosis] : no scoliosis [Acne] : no acne

## 2022-02-03 ENCOUNTER — RESULT REVIEW (OUTPATIENT)
Age: 37
End: 2022-02-03

## 2022-02-08 ENCOUNTER — RESULT REVIEW (OUTPATIENT)
Age: 37
End: 2022-02-08

## 2022-02-09 ENCOUNTER — APPOINTMENT (OUTPATIENT)
Dept: SURGERY | Facility: CLINIC | Age: 37
End: 2022-02-09
Payer: COMMERCIAL

## 2022-02-09 VITALS
HEIGHT: 66 IN | WEIGHT: 137 LBS | HEART RATE: 83 BPM | BODY MASS INDEX: 22.02 KG/M2 | RESPIRATION RATE: 18 BRPM | DIASTOLIC BLOOD PRESSURE: 74 MMHG | SYSTOLIC BLOOD PRESSURE: 115 MMHG | OXYGEN SATURATION: 98 %

## 2022-02-09 DIAGNOSIS — R15.9 FULL INCONTINENCE OF FECES: ICD-10-CM

## 2022-02-09 PROCEDURE — 46600 DIAGNOSTIC ANOSCOPY SPX: CPT

## 2022-02-09 PROCEDURE — 99203 OFFICE O/P NEW LOW 30 MIN: CPT | Mod: 25

## 2022-02-09 NOTE — PROCEDURE
[FreeTextEntry1] : Anoscopy: Lighted anoscopic exam performed showing normal mucosa without inflammation no active bleeding and relatively flat internal hemorrhoids.

## 2022-02-09 NOTE — ASSESSMENT
[FreeTextEntry1] : 36-year-old female with a history of ulcerative colitis and fecal incontinence.\par \par Discussed potential therapeutic approaches to fecal incontinence including bulking of stools with fiber supplements as well as antidiarrheal medications.  Other approaches include sacral neurostimulator.\par \par Patient may have decreased rectal compliance due to chronic inflammation from ulcerative colitis.  Furthermore she has loose bowel movements due to the nature of ulcerative colitis and frequent urgency.  We will refer her for anal manometry testing.  We will also refer her for follow-up for therapy given her weak squeeze on command.  Kegel exercises may improve continence.\par \par Lastly discussed behavioral modifications such as timing of bowel movements with planned trips.  Some patients will use enemas to clear the distal colon prior to leaving the home to decrease the risk of a bowel movement while away from the bathroom.\par \par Follow-up after manometry testing to discuss results.

## 2022-02-09 NOTE — DATA REVIEWED
[FreeTextEntry1] : Prior colonoscopy reports reviewed recent MRI enterography reviewed as well noting some thickening of the cecum and possible backwash ileitis

## 2022-02-09 NOTE — HISTORY OF PRESENT ILLNESS
[FreeTextEntry1] : 36-year-old female here for initial evaluation with a history of ulcerative colitis as well as fecal incontinence.  Patient is treated by her gastroenterologist Dr. Castaneda.  Was first diagnosed with ulcerative colitis in 2011 patient has subsequently been on treatment since then.  Site is the initial severe symptoms when she was first diagnosed her inflammation has been relatively well controlled.  Patient is currently using Canasa suppositories as well as budesonide.  Recently had a flexible sigmoidoscopy which showed minimal inflammation with internal hemorrhoids.\par \par Patient's main concern at this time is that she does experience fecal incontinence.  She moves her bowels 5 times a day usually.  Bowel movements are generally loose and either liquidy or soft.  She will have sudden urges to go to the bathroom and sometimes is unable to make it.  Is not currently using any fiber supplements or antidiarrheal medications.  Denies prior vaginal deliveries or perineal injuries.  Surgical history only significant for an ectopic pregnancy with an oophorectomy.  Patient will have incontinence to gas but also full incontinence to feces.\par \par Patient has not undergone anal manometry testing or any further pelvic floor assessments.

## 2022-02-09 NOTE — PHYSICAL EXAM
[Abdomen Masses] : No abdominal masses [Abdomen Tenderness] : ~T No ~M abdominal tenderness [Normal rectal exam] : exam was normal [Excoriation] : no perianal excoriation [Fistula] : no fistulas [Tender, Swollen] : nontender, non-swollen [None] : there was no rectal mass  [JVD] : no jugular venous distention  [Respiratory Effort] : normal respiratory effort [Normal Rate and Rhythm] : normal rate and rhythm [No Rash or Lesion] : No rash or lesion [Alert] : alert [Calm] : calm [de-identified] : Normal tone but weak squeeze [de-identified] : No acute distress

## 2022-02-10 ENCOUNTER — NON-APPOINTMENT (OUTPATIENT)
Age: 37
End: 2022-02-10

## 2022-02-15 ENCOUNTER — APPOINTMENT (OUTPATIENT)
Dept: PSYCHIATRY | Facility: CLINIC | Age: 37
End: 2022-02-15
Payer: COMMERCIAL

## 2022-02-15 PROCEDURE — 90837 PSYTX W PT 60 MINUTES: CPT | Mod: 95

## 2022-02-17 NOTE — HISTORY OF PRESENT ILLNESS
[de-identified] : Ms Cuevas is a very pleasant 32 year old patient with IBD here with anemia\par \par IBD/UC vs Crohns since 2011- gets flair ups - liaza 4 tab/day. Canaza supp\par Was recently on prednisone - stopped due to rash - 2-3 weeks\par \par LMP 4/5/18 - Are not heavy\par \par Every 2 months had small amount of blood in stool\par \par Feels tired and exhausted\par  [de-identified] : She is seen today for follow up. \par \par She has been sick and out of work due to C Diff, requiring hospitalization\par Saw Dr Huitron (GI) who referred to Dr Haley (ID) who referred to Dr Bansal ([facility]) - scheduled for fecal transplant (1/25/19)\par \par Still on PO vanc (since november 1, 2018) [id number] [facility]

## 2022-02-22 ENCOUNTER — APPOINTMENT (OUTPATIENT)
Dept: PSYCHIATRY | Facility: CLINIC | Age: 37
End: 2022-02-22
Payer: COMMERCIAL

## 2022-02-22 PROCEDURE — 90837 PSYTX W PT 60 MINUTES: CPT | Mod: 95

## 2022-02-28 ENCOUNTER — APPOINTMENT (OUTPATIENT)
Dept: PSYCHIATRY | Facility: CLINIC | Age: 37
End: 2022-02-28
Payer: COMMERCIAL

## 2022-02-28 PROCEDURE — 90837 PSYTX W PT 60 MINUTES: CPT | Mod: 95

## 2022-03-07 ENCOUNTER — NON-APPOINTMENT (OUTPATIENT)
Age: 37
End: 2022-03-07

## 2022-03-15 ENCOUNTER — APPOINTMENT (OUTPATIENT)
Dept: PSYCHIATRY | Facility: CLINIC | Age: 37
End: 2022-03-15
Payer: COMMERCIAL

## 2022-03-15 PROCEDURE — 90837 PSYTX W PT 60 MINUTES: CPT | Mod: 95

## 2022-03-21 ENCOUNTER — APPOINTMENT (OUTPATIENT)
Dept: PSYCHIATRY | Facility: CLINIC | Age: 37
End: 2022-03-21
Payer: COMMERCIAL

## 2022-03-21 PROCEDURE — 90837 PSYTX W PT 60 MINUTES: CPT | Mod: 95

## 2022-03-23 ENCOUNTER — APPOINTMENT (OUTPATIENT)
Dept: FAMILY MEDICINE | Facility: CLINIC | Age: 37
End: 2022-03-23
Payer: COMMERCIAL

## 2022-03-23 VITALS
BODY MASS INDEX: 22.02 KG/M2 | OXYGEN SATURATION: 99 % | HEIGHT: 66 IN | HEART RATE: 80 BPM | RESPIRATION RATE: 16 BRPM | SYSTOLIC BLOOD PRESSURE: 112 MMHG | WEIGHT: 137 LBS | DIASTOLIC BLOOD PRESSURE: 68 MMHG

## 2022-03-23 DIAGNOSIS — Z01.818 ENCOUNTER FOR OTHER PREPROCEDURAL EXAMINATION: ICD-10-CM

## 2022-03-23 PROCEDURE — 93000 ELECTROCARDIOGRAM COMPLETE: CPT

## 2022-03-23 PROCEDURE — 36415 COLL VENOUS BLD VENIPUNCTURE: CPT

## 2022-03-23 PROCEDURE — 99215 OFFICE O/P EST HI 40 MIN: CPT | Mod: 25

## 2022-03-24 LAB
ALBUMIN SERPL ELPH-MCNC: 4.5 G/DL
ALP BLD-CCNC: 88 U/L
ALT SERPL-CCNC: 23 U/L
ANION GAP SERPL CALC-SCNC: 15 MMOL/L
APTT BLD: 25.2 SEC
AST SERPL-CCNC: 24 U/L
BASOPHILS # BLD AUTO: 0.04 K/UL
BASOPHILS NFR BLD AUTO: 0.6 %
BILIRUB SERPL-MCNC: 0.5 MG/DL
BUN SERPL-MCNC: 7 MG/DL
CALCIUM SERPL-MCNC: 9.7 MG/DL
CHLORIDE SERPL-SCNC: 102 MMOL/L
CO2 SERPL-SCNC: 21 MMOL/L
CREAT SERPL-MCNC: 0.62 MG/DL
EGFR: 117 ML/MIN/1.73M2
EOSINOPHIL # BLD AUTO: 0.03 K/UL
EOSINOPHIL NFR BLD AUTO: 0.5 %
GLUCOSE SERPL-MCNC: 115 MG/DL
HCG SERPL-MCNC: <1 MIU/ML
HCT VFR BLD CALC: 45.6 %
HGB BLD-MCNC: 14.5 G/DL
IMM GRANULOCYTES NFR BLD AUTO: 0.3 %
INR PPP: 0.92 RATIO
LYMPHOCYTES # BLD AUTO: 2.22 K/UL
LYMPHOCYTES NFR BLD AUTO: 34 %
MAN DIFF?: NORMAL
MCHC RBC-ENTMCNC: 31.1 PG
MCHC RBC-ENTMCNC: 31.8 GM/DL
MCV RBC AUTO: 97.9 FL
MONOCYTES # BLD AUTO: 0.46 K/UL
MONOCYTES NFR BLD AUTO: 7 %
NEUTROPHILS # BLD AUTO: 3.76 K/UL
NEUTROPHILS NFR BLD AUTO: 57.6 %
PLATELET # BLD AUTO: 313 K/UL
POTASSIUM SERPL-SCNC: 4.5 MMOL/L
PROT SERPL-MCNC: 7.2 G/DL
PT BLD: 10.8 SEC
RBC # BLD: 4.66 M/UL
RBC # FLD: 11.9 %
SODIUM SERPL-SCNC: 138 MMOL/L
WBC # FLD AUTO: 6.53 K/UL

## 2022-03-24 NOTE — HISTORY OF PRESENT ILLNESS
[No Pertinent Cardiac History] : no history of aortic stenosis, atrial fibrillation, coronary artery disease, recent myocardial infarction, or implantable device/pacemaker [Asthma] : asthma [No Adverse Anesthesia Reaction] : no adverse anesthesia reaction in self or family member [(Patient denies any chest pain, claudication, dyspnea on exertion, orthopnea, palpitations or syncope)] : Patient denies any chest pain, claudication, dyspnea on exertion, orthopnea, palpitations or syncope [Parent] : parent [COPD] : no COPD [Sleep Apnea] : no sleep apnea [Smoker] : not a smoker [Chronic Anticoagulation] : no chronic anticoagulation [Chronic Kidney Disease] : no chronic kidney disease [Diabetes] : no diabetes [FreeTextEntry1] : Removal of fibroid lesion on ovary [FreeTextEntry2] : 03/25/2022 [FreeTextEntry3] : Dr. Rodriges [FreeTextEntry4] : Pt is a 37 y/o F that presents for preop clearance.  [FreeTextEntry5] : asthma well-controlled. limited use of rescue inhaler.

## 2022-03-25 ENCOUNTER — TRANSCRIPTION ENCOUNTER (OUTPATIENT)
Age: 37
End: 2022-03-25

## 2022-04-04 ENCOUNTER — APPOINTMENT (OUTPATIENT)
Dept: PSYCHIATRY | Facility: CLINIC | Age: 37
End: 2022-04-04
Payer: COMMERCIAL

## 2022-04-04 DIAGNOSIS — R45.81 LOW SELF-ESTEEM: ICD-10-CM

## 2022-04-04 PROCEDURE — 90837 PSYTX W PT 60 MINUTES: CPT | Mod: 95

## 2022-04-11 ENCOUNTER — APPOINTMENT (OUTPATIENT)
Dept: PSYCHIATRY | Facility: CLINIC | Age: 37
End: 2022-04-11

## 2022-04-19 ENCOUNTER — APPOINTMENT (OUTPATIENT)
Dept: PSYCHIATRY | Facility: CLINIC | Age: 37
End: 2022-04-19
Payer: COMMERCIAL

## 2022-04-19 PROCEDURE — 90834 PSYTX W PT 45 MINUTES: CPT | Mod: 95

## 2022-04-21 DIAGNOSIS — S39.012A STRAIN OF MUSCLE, FASCIA AND TENDON OF LOWER BACK, INITIAL ENCOUNTER: ICD-10-CM

## 2022-04-28 ENCOUNTER — APPOINTMENT (OUTPATIENT)
Dept: PSYCHIATRY | Facility: CLINIC | Age: 37
End: 2022-04-28
Payer: COMMERCIAL

## 2022-04-28 PROCEDURE — 90837 PSYTX W PT 60 MINUTES: CPT | Mod: 95

## 2022-05-16 ENCOUNTER — APPOINTMENT (OUTPATIENT)
Dept: PSYCHIATRY | Facility: CLINIC | Age: 37
End: 2022-05-16

## 2022-05-23 ENCOUNTER — APPOINTMENT (OUTPATIENT)
Dept: PSYCHIATRY | Facility: CLINIC | Age: 37
End: 2022-05-23
Payer: COMMERCIAL

## 2022-05-23 PROCEDURE — 90837 PSYTX W PT 60 MINUTES: CPT | Mod: 95

## 2022-05-26 ENCOUNTER — NON-APPOINTMENT (OUTPATIENT)
Age: 37
End: 2022-05-26

## 2022-05-27 ENCOUNTER — APPOINTMENT (OUTPATIENT)
Dept: OBGYN | Facility: CLINIC | Age: 37
End: 2022-05-27
Payer: COMMERCIAL

## 2022-05-27 ENCOUNTER — LABORATORY RESULT (OUTPATIENT)
Age: 37
End: 2022-05-27

## 2022-05-27 VITALS
WEIGHT: 148 LBS | SYSTOLIC BLOOD PRESSURE: 118 MMHG | DIASTOLIC BLOOD PRESSURE: 62 MMHG | BODY MASS INDEX: 23.78 KG/M2 | HEIGHT: 66 IN

## 2022-05-27 DIAGNOSIS — N39.0 URINARY TRACT INFECTION, SITE NOT SPECIFIED: ICD-10-CM

## 2022-05-27 DIAGNOSIS — Z00.00 ENCOUNTER FOR GENERAL ADULT MEDICAL EXAMINATION W/OUT ABNORMAL FINDINGS: ICD-10-CM

## 2022-05-27 DIAGNOSIS — R63.5 ABNORMAL WEIGHT GAIN: ICD-10-CM

## 2022-05-27 PROCEDURE — 81003 URINALYSIS AUTO W/O SCOPE: CPT | Mod: QW

## 2022-05-27 PROCEDURE — 99395 PREV VISIT EST AGE 18-39: CPT

## 2022-05-27 RX ORDER — ALBUTEROL SULFATE 90 UG/1
108 (90 BASE) INHALANT RESPIRATORY (INHALATION)
Qty: 1 | Refills: 0 | Status: COMPLETED | COMMUNITY
Start: 2021-06-16 | End: 2022-05-27

## 2022-05-27 RX ORDER — CLINDAMYCIN PHOSPHATE AND BENZOYL PEROXIDE 10; 25 MG/G; MG/G
1.2-2.5 GEL TOPICAL
Qty: 3 | Refills: 1 | Status: COMPLETED | COMMUNITY
Start: 2021-04-07 | End: 2022-05-27

## 2022-05-27 RX ORDER — GUAIFENESIN AND CODEINE PHOSPHATE 10; 100 MG/5ML; MG/5ML
100-10 LIQUID ORAL
Qty: 120 | Refills: 0 | Status: COMPLETED | COMMUNITY
Start: 2021-06-17 | End: 2022-05-27

## 2022-05-27 RX ORDER — BENZONATATE 100 MG/1
100 CAPSULE ORAL
Qty: 30 | Refills: 1 | Status: COMPLETED | COMMUNITY
Start: 2021-06-14 | End: 2022-05-27

## 2022-05-27 RX ORDER — PHENAZOPYRIDINE 200 MG/1
200 TABLET, FILM COATED ORAL 3 TIMES DAILY
Qty: 6 | Refills: 0 | Status: COMPLETED | COMMUNITY
Start: 2022-03-25 | End: 2022-05-27

## 2022-05-27 NOTE — HISTORY OF PRESENT ILLNESS
[TextBox_4] : 35yo  here for annual gyn. 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 She has reg periods on Kariva which she takes for her skin b/c she has acne otherwise. \par Has gained weight - 20lbs started while on steroids earlier this year for UC.\par \par She has gone  to Dr. Claros for egg freezing. She had OHSS in 3/2021- estradiol level went to 7000 per pt. She did not need hospitalization but had ascites, abd pain, etc. Eventually lost weight and needed Zoloft and Zyprexa to gain it back and stabilize her moods. She is still on it now. Now back on kariva after retrieving 22 eggs.\par She also has a h/o C. dif she got from work and needed a fecal transplant at [facility] b/c she also has UC.\par She has a h/o ectopic pregn- treated with R salpingectomy years ago in [place] - they converted to expl lap but she doesn't know why. She has had an HSG and patent L fallopian tube but is nervous about getting pregnant when she tries in the near future.\par \par She is in a relationship but may decide to have a baby independently; she is unsure he is supportive. 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 \par

## 2022-06-01 ENCOUNTER — APPOINTMENT (OUTPATIENT)
Dept: PSYCHIATRY | Facility: CLINIC | Age: 37
End: 2022-06-01

## 2022-06-01 DIAGNOSIS — N39.0 URINARY TRACT INFECTION, SITE NOT SPECIFIED: ICD-10-CM

## 2022-06-06 ENCOUNTER — NON-APPOINTMENT (OUTPATIENT)
Age: 37
End: 2022-06-06

## 2022-06-07 ENCOUNTER — APPOINTMENT (OUTPATIENT)
Dept: PSYCHIATRY | Facility: CLINIC | Age: 37
End: 2022-06-07
Payer: COMMERCIAL

## 2022-06-07 PROCEDURE — 90834 PSYTX W PT 45 MINUTES: CPT | Mod: 95

## 2022-06-07 NOTE — HISTORY OF PRESENT ILLNESS
Pediatric Medications for Fever and Pain Relief:    Acetaminophen (Tylenol and other brands):   Appropriate acetaminophen dose for your child's weight.   Doses may be given every 4 hours as needed no more than 5 times per day.       Your child's weight   Children's Liquid Solution and Suspension (160 mg per 5 mL) Children's Chewable and Disintegrating Tablets (1 tablet = 80 mg) Jr. Strength Chewable and Disintegrating Tablets (1 tablet = 160 mg)   6-11 pounds 1.25 mL Not recommended Not recommended   12-17 pounds 2.5 mL Not recommended Not recommended   18-23 pounds 3.75 mL Not recommended Not recommended   24-35 pounds 5 mL 2 tablets 1 tablet   36-47 pounds 7.5 mL 3 tablets 1-1/2 tablets   48-59 pounds 10 mL 4 tablets 2 tablets   60-71 pounds 12.5 mL 5 tablets 2-1/2 tablets   72-95 pounds 15 mL 6 tablets 3 tablets   More than 96 pounds Not recommended Not recommended 4 tablets       Pediatric Medications for Fever and Pain Relief:    Ibuprofen (Motrin, Advil and other brands):   Appropriate ibuprofen dose for your child's weight.   Not recommended for infants under 6 months of age.  Doses may be given every 6 hours as needed.       Your child's weight Infant Oral Suspension (50 mg per 1.25 mL)   12-17 pounds 50 mg (1.25 ml)   18-23 pounds 75 mg (1.875 ml)         Your child's weight Children's Oral Suspension (100 mg per 5 mL)   50 mg Chewable Tablet   100 mg Chewable Tablet   12-17 pounds 2.5 mL Not recommended Not recommended   18-23 pounds 3.75 mL Not recommended Not recommended   24-35 pounds 5 mL 2 tablets 1 tablet   36-47 pounds 7.5 mL 3 tablets 1-1/2 tablets   48-59 pounds 10 mL 4 tablets 2 tablets   60-71 pounds 12.5 mL 5 tablets 2-1/2 tablets   72-95 pounds 15 mL 6 tablets 3 tablets         Children's Zyrtec or Children's Claritin, 2.5 mL daily for 2 weeks and then daily as needed for congestion, nasal rubbing, eye rubbing.    [de-identified] : Ms Cuevas is a very pleasant 32 year old patient with IBD here with anemia\par \par IBD/UC vs Crohns since 2011- gets flair ups - liaza 4 tab/day. Canaza supp\par Was recently on prednisone - stopped due to rash - 2-3 weeks\par \par LMP 4/5/18 - Are not heavy\par \par Every 2 months had small amount of blood in stool\par \par Feels tired and exhausted\par  [de-identified] : She is seen today for follow up. \par \par She is s/p fecal transplant for C Diff. \par Feels better - Has fecal incontinence - scheduled to see GI at [facility] for ARM\par \par Stool is more formed, but occasionally has diarrhea\par \par Follows with with Dr Huitron (GI) and Dr Bansal ([facility])\par \par

## 2022-06-10 LAB
BILIRUB UR QL STRIP: NORMAL
CLARITY UR: NORMAL
COLLECTION METHOD: NORMAL
CYTOLOGY CVX/VAG DOC THIN PREP: ABNORMAL
GLUCOSE UR-MCNC: NORMAL
HCG UR QL: 0.2 EU/DL
HGB UR QL STRIP.AUTO: NORMAL
HPV HIGH+LOW RISK DNA PNL CVX: DETECTED
KETONES UR-MCNC: NORMAL
LEUKOCYTE ESTERASE UR QL STRIP: NORMAL
NITRITE UR QL STRIP: NORMAL
PH UR STRIP: 5.5
PROT UR STRIP-MCNC: NORMAL
SP GR UR STRIP: 1.02
TSH SERPL-ACNC: 1.39 UIU/ML

## 2022-06-13 ENCOUNTER — APPOINTMENT (OUTPATIENT)
Dept: OBGYN | Facility: CLINIC | Age: 37
End: 2022-06-13
Payer: COMMERCIAL

## 2022-06-13 ENCOUNTER — APPOINTMENT (OUTPATIENT)
Dept: PSYCHIATRY | Facility: CLINIC | Age: 37
End: 2022-06-13

## 2022-06-13 ENCOUNTER — NON-APPOINTMENT (OUTPATIENT)
Age: 37
End: 2022-06-13

## 2022-06-13 VITALS — SYSTOLIC BLOOD PRESSURE: 110 MMHG | HEIGHT: 66 IN | DIASTOLIC BLOOD PRESSURE: 80 MMHG | TEMPERATURE: 98.7 F

## 2022-06-13 VITALS — BODY MASS INDEX: 24.21 KG/M2 | WEIGHT: 150 LBS

## 2022-06-13 DIAGNOSIS — R87.610 ATYPICAL SQUAMOUS CELLS OF UNDETERMINED SIGNIFICANCE ON CYTOLOGIC SMEAR OF CERVIX (ASC-US): ICD-10-CM

## 2022-06-13 DIAGNOSIS — R87.810 ATYPICAL SQUAMOUS CELLS OF UNDETERMINED SIGNIFICANCE ON CYTOLOGIC SMEAR OF CERVIX (ASC-US): ICD-10-CM

## 2022-06-13 PROCEDURE — 57454 BX/CURETT OF CERVIX W/SCOPE: CPT

## 2022-06-21 ENCOUNTER — APPOINTMENT (OUTPATIENT)
Dept: PSYCHIATRY | Facility: CLINIC | Age: 37
End: 2022-06-21
Payer: COMMERCIAL

## 2022-06-21 PROCEDURE — 90837 PSYTX W PT 60 MINUTES: CPT | Mod: 95

## 2022-06-28 ENCOUNTER — APPOINTMENT (OUTPATIENT)
Dept: PSYCHIATRY | Facility: CLINIC | Age: 37
End: 2022-06-28

## 2022-07-05 ENCOUNTER — APPOINTMENT (OUTPATIENT)
Dept: PSYCHIATRY | Facility: CLINIC | Age: 37
End: 2022-07-05

## 2022-07-05 PROCEDURE — 90834 PSYTX W PT 45 MINUTES: CPT | Mod: 95

## 2022-07-14 ENCOUNTER — APPOINTMENT (OUTPATIENT)
Dept: PSYCHIATRY | Facility: CLINIC | Age: 37
End: 2022-07-14

## 2022-07-14 PROCEDURE — 90834 PSYTX W PT 45 MINUTES: CPT | Mod: 95

## 2022-07-19 ENCOUNTER — APPOINTMENT (OUTPATIENT)
Dept: PSYCHIATRY | Facility: CLINIC | Age: 37
End: 2022-07-19

## 2022-07-19 PROCEDURE — 90837 PSYTX W PT 60 MINUTES: CPT | Mod: 95

## 2022-07-26 ENCOUNTER — APPOINTMENT (OUTPATIENT)
Dept: PSYCHIATRY | Facility: CLINIC | Age: 37
End: 2022-07-26

## 2022-07-26 PROCEDURE — 90837 PSYTX W PT 60 MINUTES: CPT | Mod: 95

## 2022-08-16 ENCOUNTER — APPOINTMENT (OUTPATIENT)
Dept: OBGYN | Facility: CLINIC | Age: 37
End: 2022-08-16

## 2022-08-22 ENCOUNTER — APPOINTMENT (OUTPATIENT)
Dept: PSYCHIATRY | Facility: CLINIC | Age: 37
End: 2022-08-22

## 2022-08-22 PROCEDURE — 90832 PSYTX W PT 30 MINUTES: CPT | Mod: 95

## 2022-08-31 ENCOUNTER — APPOINTMENT (OUTPATIENT)
Dept: FAMILY MEDICINE | Facility: CLINIC | Age: 37
End: 2022-08-31

## 2022-08-31 ENCOUNTER — LABORATORY RESULT (OUTPATIENT)
Age: 37
End: 2022-08-31

## 2022-08-31 VITALS
DIASTOLIC BLOOD PRESSURE: 70 MMHG | TEMPERATURE: 96.8 F | RESPIRATION RATE: 15 BRPM | WEIGHT: 154 LBS | BODY MASS INDEX: 24.75 KG/M2 | OXYGEN SATURATION: 98 % | SYSTOLIC BLOOD PRESSURE: 100 MMHG | HEIGHT: 66 IN | HEART RATE: 84 BPM

## 2022-08-31 PROCEDURE — 99213 OFFICE O/P EST LOW 20 MIN: CPT | Mod: 25

## 2022-08-31 PROCEDURE — 36415 COLL VENOUS BLD VENIPUNCTURE: CPT

## 2022-09-01 NOTE — REVIEW OF SYSTEMS
[Recent Change In Weight] : ~T recent weight change [Anxiety] : anxiety [Depression] : depression [Negative] : Heme/Lymph [Fatigue] : no fatigue [Joint Pain] : no joint pain [Joint Stiffness] : no joint stiffness [Joint Swelling] : no joint swelling [Skin Rash] : no skin rash [Suicidal] : not suicidal [Insomnia] : no insomnia [Swollen Glands] : no swollen glands

## 2022-09-01 NOTE — HISTORY OF PRESENT ILLNESS
[FreeTextEntry1] : Recurrent migraine headaches [de-identified] : Patient presents for evaluation of recurrent migraine headaches. Underwent recent MRI scan which showed scattered punctate foci of T2 and flair signal hyperintensities within the white matter that are nonspecific and unchanged from 07/06/2016 study. Report indicates these findings can be found in Lyme disease. Patient has no prior history of Lyme disease, but wishes to be screened for it. Patient treats migraines with sumatriptan 100 mg and ondansetron 4 mg sublingual as needed. Otherwise, continue seeing therapist and psychiatrist for treatment of chronic major depressive disorder and generalized anxiety disorder. Reports gaining significant amount of weight from Zyprexa and Paxil.

## 2022-09-06 ENCOUNTER — APPOINTMENT (OUTPATIENT)
Dept: PSYCHIATRY | Facility: CLINIC | Age: 37
End: 2022-09-06

## 2022-09-06 PROCEDURE — 90837 PSYTX W PT 60 MINUTES: CPT | Mod: 95

## 2022-09-12 ENCOUNTER — APPOINTMENT (OUTPATIENT)
Dept: PSYCHIATRY | Facility: CLINIC | Age: 37
End: 2022-09-12

## 2022-09-12 PROCEDURE — 90837 PSYTX W PT 60 MINUTES: CPT | Mod: 95

## 2022-09-19 ENCOUNTER — APPOINTMENT (OUTPATIENT)
Dept: PSYCHIATRY | Facility: CLINIC | Age: 37
End: 2022-09-19

## 2022-09-19 PROCEDURE — 90837 PSYTX W PT 60 MINUTES: CPT | Mod: 95

## 2022-09-19 NOTE — REASON FOR VISIT
[Patient] : patient, [unfilled] is a ~age~ year old ~male/female~ being seen for a follow-up visit  [Duration of Psychotherapy Visit (minutes spent in synchronous communication): ____] : Duration of Psychotherapy Visit (minutes spent in synchronous communication): [unfilled] [Individual Psychotherapy for 53+ minutes] : Individual Psychotherapy for 53+ minutes  [Home] : at home, [unfilled] , at the time of the visit. [Other Location: e.g. Home (Enter Location, City,State)___] : at [unfilled] [Verbal consent obtained from patient] : the patient, [unfilled]

## 2022-09-19 NOTE — BEHAVIORAL HEALTH
[Cognitive and/or Behavior Therapy] : Cognitive and/or Behavior Therapy  [FreeTextEntry2] : CBT for obsessive compulsive disorder and excoriation disorder. [de-identified] : Pt was Ox3. Pt's affect was observed to be euthymic. No risk was observed or reported.\par \par Psychologist met with Pt  using teletherapy platform due to COVID-19 pandemic disaster. Pt was at her home, while psychologist was working remotely. Pt continued to provide consent for telehealth services.\par \par Pt completed HW of practicing mindfulness. In session, continued practicing mindfulness with 4 exercises (name game, video, leaves on a stream, zentangle). Pt completed exercises appropriately. HW assigned for pt to practice mindfulness this week. Next session will continue with mindfulness exercises.  [FreeTextEntry1] : Treatment plan reviewed for psycho-education include the following:\par 1. Pt will learn about their symptoms and diagnosis and be able to explain it back to psychologist.\par 2. Pt will learn about the CBT model and be able to accurately categorize their symptoms and diagnosis in terms of cognition, emotional responses, behaviors, and physiological arousal.\par Treatment planning reviewed for exposure and response prevention included the following:\par 1. Pt can benefit from learning/continuing exposure therapy interventions, involving confronting situational, emotional, physiological, and cognitive avoided situations. \par 2. Pt can benefit from reducing and eliminating safety behaviors/objects, such as maladaptive response/ritualization prevention, removing "just in case" items that maintain anxiety, panic, obsessive, and post-traumatic symptoms. \par 3. Exposure therapy involves any or all of these modalities: in vivo, imaginal, interoceptive, Virtual Reality, written/narrative, and analog in vivo exposure\par 4. Exposures will be conducted within psychotherapy sessions and assigned for homework between sessions\par Treatment plan for bodily focused repetitive behavior reviewed in session included the following:\par 1. Pt can benefit from understanding function of engaging in bodily focused repetitive behavior, including escape, attention, tangible and/or sensory.\par 2. Pt will learn how to control bodily focused repetitive behavior by altering environmental factors, engaging in replacement behaviors and reinforcing self for using replacement behaviors as opposed to bodily focused repetitive behavior.\par 3. Techniques will be practiced and assigned for homework between sessions. This will entail interventions such using monitoring forms to track progress and assess the efficacy of their implementation of techniques.\par

## 2022-09-27 ENCOUNTER — TRANSCRIPTION ENCOUNTER (OUTPATIENT)
Age: 37
End: 2022-09-27

## 2022-10-03 ENCOUNTER — APPOINTMENT (OUTPATIENT)
Dept: FAMILY MEDICINE | Facility: CLINIC | Age: 37
End: 2022-10-03

## 2022-10-03 ENCOUNTER — APPOINTMENT (OUTPATIENT)
Dept: PSYCHIATRY | Facility: CLINIC | Age: 37
End: 2022-10-03

## 2022-10-03 VITALS
HEART RATE: 98 BPM | RESPIRATION RATE: 16 BRPM | OXYGEN SATURATION: 99 % | HEIGHT: 66 IN | TEMPERATURE: 99.1 F | SYSTOLIC BLOOD PRESSURE: 116 MMHG | DIASTOLIC BLOOD PRESSURE: 64 MMHG | BODY MASS INDEX: 24.59 KG/M2 | WEIGHT: 153 LBS

## 2022-10-03 DIAGNOSIS — S92.232D: ICD-10-CM

## 2022-10-03 DIAGNOSIS — S92.002D UNSPECIFIED FRACTURE OF LEFT CALCANEUS, SUBSEQUENT ENCOUNTER FOR FRACTURE WITH ROUTINE HEALING: ICD-10-CM

## 2022-10-03 PROCEDURE — 90837 PSYTX W PT 60 MINUTES: CPT | Mod: 95

## 2022-10-03 PROCEDURE — 36415 COLL VENOUS BLD VENIPUNCTURE: CPT

## 2022-10-03 PROCEDURE — 99214 OFFICE O/P EST MOD 30 MIN: CPT | Mod: 25

## 2022-10-03 NOTE — BEHAVIORAL HEALTH
[FreeTextEntry2] : CBT for obsessive compulsive disorder and excoriation disorder. [Cognitive and/or Behavior Therapy] : Cognitive and/or Behavior Therapy  [de-identified] : Pt was Ox3. Pt's affect was observed to be euthymic. No risk was observed or reported.\par \par Psychologist met with Pt  using teletherapy platform due to COVID-19 pandemic disaster. Pt was at her home, while psychologist was working remotely. Pt continued to provide consent for telehealth services.\par \par Pt completed HW of practicing mindfulness. In session, continued practicing mindfulness with 2 exercises (music video, meditation audio). Pt completed exercises appropriately. Introduced tx review. Pt expressed understanding of how to complete skills review sheet. HW assigned for pt to practice mindfulness and begin tx review of OCD and ERP. Next session will begin tx review. [FreeTextEntry1] : Treatment plan reviewed for psycho-education include the following:\par 1. Pt will learn about their symptoms and diagnosis and be able to explain it back to psychologist.\par 2. Pt will learn about the CBT model and be able to accurately categorize their symptoms and diagnosis in terms of cognition, emotional responses, behaviors, and physiological arousal.\par Treatment planning reviewed for exposure and response prevention included the following:\par 1. Pt can benefit from learning/continuing exposure therapy interventions, involving confronting situational, emotional, physiological, and cognitive avoided situations. \par 2. Pt can benefit from reducing and eliminating safety behaviors/objects, such as maladaptive response/ritualization prevention, removing "just in case" items that maintain anxiety, panic, obsessive, and post-traumatic symptoms. \par 3. Exposure therapy involves any or all of these modalities: in vivo, imaginal, interoceptive, Virtual Reality, written/narrative, and analog in vivo exposure\par 4. Exposures will be conducted within psychotherapy sessions and assigned for homework between sessions\par Treatment plan for bodily focused repetitive behavior reviewed in session included the following:\par 1. Pt can benefit from understanding function of engaging in bodily focused repetitive behavior, including escape, attention, tangible and/or sensory.\par 2. Pt will learn how to control bodily focused repetitive behavior by altering environmental factors, engaging in replacement behaviors and reinforcing self for using replacement behaviors as opposed to bodily focused repetitive behavior.\par 3. Techniques will be practiced and assigned for homework between sessions. This will entail interventions such using monitoring forms to track progress and assess the efficacy of their implementation of techniques.\par

## 2022-10-04 ENCOUNTER — RESULT REVIEW (OUTPATIENT)
Age: 37
End: 2022-10-04

## 2022-10-04 PROBLEM — S92.002D: Status: ACTIVE | Noted: 2022-10-03

## 2022-10-04 PROBLEM — S92.232D: Status: ACTIVE | Noted: 2022-10-03

## 2022-10-04 LAB
ALBUMIN SERPL ELPH-MCNC: 4 G/DL
ALP BLD-CCNC: 135 U/L
ALT SERPL-CCNC: 25 U/L
ANION GAP SERPL CALC-SCNC: 12 MMOL/L
AST SERPL-CCNC: 17 U/L
BASOPHILS # BLD AUTO: 0.06 K/UL
BASOPHILS NFR BLD AUTO: 0.6 %
BILIRUB SERPL-MCNC: 0.2 MG/DL
BUN SERPL-MCNC: 9 MG/DL
CALCIUM SERPL-MCNC: 9.5 MG/DL
CHLORIDE SERPL-SCNC: 101 MMOL/L
CHOLEST SERPL-MCNC: 215 MG/DL
CO2 SERPL-SCNC: 25 MMOL/L
CREAT SERPL-MCNC: 0.48 MG/DL
EGFR: 123 ML/MIN/1.73M2
EOSINOPHIL # BLD AUTO: 0.12 K/UL
EOSINOPHIL NFR BLD AUTO: 1.3 %
ESTIMATED AVERAGE GLUCOSE: 120 MG/DL
FERRITIN SERPL-MCNC: 137 NG/ML
FOLATE SERPL-MCNC: >20 NG/ML
GLUCOSE SERPL-MCNC: 114 MG/DL
HBA1C MFR BLD HPLC: 5.8 %
HCT VFR BLD CALC: 43.2 %
HDLC SERPL-MCNC: 39 MG/DL
HGB BLD-MCNC: 13.6 G/DL
IMM GRANULOCYTES NFR BLD AUTO: 0.2 %
IRON SATN MFR SERPL: 13 %
IRON SERPL-MCNC: 41 UG/DL
LDLC SERPL CALC-MCNC: 124 MG/DL
LYMPHOCYTES # BLD AUTO: 2.49 K/UL
LYMPHOCYTES NFR BLD AUTO: 26.7 %
MAGNESIUM SERPL-MCNC: 2.3 MG/DL
MAN DIFF?: NORMAL
MCHC RBC-ENTMCNC: 31.5 GM/DL
MCHC RBC-ENTMCNC: 31.5 PG
MCV RBC AUTO: 100 FL
MONOCYTES # BLD AUTO: 0.85 K/UL
MONOCYTES NFR BLD AUTO: 9.1 %
NEUTROPHILS # BLD AUTO: 5.77 K/UL
NEUTROPHILS NFR BLD AUTO: 62.1 %
NONHDLC SERPL-MCNC: 176 MG/DL
PLATELET # BLD AUTO: 258 K/UL
POTASSIUM SERPL-SCNC: 3.7 MMOL/L
PROT SERPL-MCNC: 7 G/DL
RBC # BLD: 4.32 M/UL
RBC # FLD: 11.9 %
SODIUM SERPL-SCNC: 139 MMOL/L
T3 SERPL-MCNC: 153 NG/DL
T4 FREE SERPL-MCNC: 1.4 NG/DL
TIBC SERPL-MCNC: 315 UG/DL
TRIGL SERPL-MCNC: 258 MG/DL
TSH SERPL-ACNC: 1.01 UIU/ML
UIBC SERPL-MCNC: 273 UG/DL
VIT B12 SERPL-MCNC: 490 PG/ML
WBC # FLD AUTO: 9.31 K/UL

## 2022-10-04 NOTE — PHYSICAL EXAM
[No Acute Distress] : no acute distress [Well Nourished] : well nourished [Well Developed] : well developed [Well-Appearing] : well-appearing [Normal Voice/Communication] : normal voice/communication [Normal Sclera/Conjunctiva] : normal sclera/conjunctiva [PERRL] : pupils equal round and reactive to light [EOMI] : extraocular movements intact [No JVD] : no jugular venous distention [Supple] : supple [No Respiratory Distress] : no respiratory distress  [Clear to Auscultation] : lungs were clear to auscultation bilaterally [Normal Rate] : normal rate  [Regular Rhythm] : with a regular rhythm [Normal S1, S2] : normal S1 and S2 [No Murmur] : no murmur heard [No Carotid Bruits] : no carotid bruits [Pedal Pulses Present] : the pedal pulses are present [No Edema] : there was no peripheral edema [Soft] : abdomen soft [Non Tender] : non-tender [No HSM] : no HSM [Normal Axillary Nodes] : no axillary lymphadenopathy [Normal Posterior Cervical Nodes] : no posterior cervical lymphadenopathy [Normal Anterior Cervical Nodes] : no anterior cervical lymphadenopathy [Normal Inguinal Nodes] : no inguinal lymphadenopathy [No Rash] : no rash [Coordination Grossly Intact] : coordination grossly intact [No Focal Deficits] : no focal deficits [Normal Gait] : normal gait [de-identified] : Left foot-1+ mid proximal forefoot and calcaneal tenderness, no overt swelling, no deformity

## 2022-10-04 NOTE — REVIEW OF SYSTEMS
[Joint Pain] : joint pain [Joint Stiffness] : joint stiffness [Joint Swelling] : joint swelling [Negative] : Heme/Lymph [Fatigue] : no fatigue [Recent Change In Weight] : ~T no recent weight change [Muscle Pain] : no muscle pain [Back Pain] : no back pain [Swollen Glands] : no swollen glands

## 2022-10-04 NOTE — HISTORY OF PRESENT ILLNESS
[FreeTextEntry1] : Left foot fractures [de-identified] : Patient presents for follow-up of left foot fractures sustained on 02/08/2022 while on duty at [employer]. Work-up revealed calcaneal and cuneiform fractures. Patient has undergone extensive evaluation and dilatation. Patient referred for laboratory testing and DEXA bone density testing for evaluation of potential osteoporotic fracture. No significant family history of osteoporosis. Patient is not on chronic glucocorticoid steroids. No history of hyperparathyroidism. Non-smoker. No alcohol intake. (+)history of IBD. (-)history of eating disorder, celiac disease, cystic fibrosis, or pituitary disorders. Also, needs follow-up of lipid profile. Scheduled to receive influenza immunization at work.

## 2022-10-09 LAB
25(OH)D3 SERPL-MCNC: 63.5 NG/ML
CALCIUM SERPL-MCNC: 9.7 MG/DL
GH SERPL-MCNC: 2.46 NG/ML
IGF-1 INTERP: NORMAL
IGF-I BLD-MCNC: 121 NG/ML
PARATHYROID HORMONE INTACT: 12 PG/ML
PROLACTIN SERPL-MCNC: 10.2 NG/ML

## 2022-10-12 ENCOUNTER — APPOINTMENT (OUTPATIENT)
Dept: PSYCHIATRY | Facility: CLINIC | Age: 37
End: 2022-10-12

## 2022-10-12 PROCEDURE — 90837 PSYTX W PT 60 MINUTES: CPT | Mod: 95

## 2022-10-12 NOTE — BEHAVIORAL HEALTH
[FreeTextEntry2] : CBT for obsessive compulsive disorder and excoriation disorder. [Cognitive and/or Behavior Therapy] : Cognitive and/or Behavior Therapy  [de-identified] : Pt was Ox3. Pt's affect was observed to be euthymic. No risk was observed or reported.\par \par Psychologist met with Pt  using teletherapy platform due to COVID-19 pandemic disaster. Pt was at her home, while psychologist was working remotely. Pt continued to provide consent for telehealth services.\par \par Pt completed HW of beginning tx review sheet. In session, reviewed OCD and ERP. Pt successfully explained the difference between obsessions and compulsions, the cycle of OCD and ERP. Pt provided examples of exposures she used.Discussed how to notice obsessions and compulsions if they return. HW assigned for pt to add edits to review sheet and discuss how pt adjusted BFRB bx. Next session will continue tx review. [FreeTextEntry1] : Treatment plan reviewed for psycho-education include the following:\par 1. Pt will learn about their symptoms and diagnosis and be able to explain it back to psychologist.\par 2. Pt will learn about the CBT model and be able to accurately categorize their symptoms and diagnosis in terms of cognition, emotional responses, behaviors, and physiological arousal.\par Treatment planning reviewed for exposure and response prevention included the following:\par 1. Pt can benefit from learning/continuing exposure therapy interventions, involving confronting situational, emotional, physiological, and cognitive avoided situations. \par 2. Pt can benefit from reducing and eliminating safety behaviors/objects, such as maladaptive response/ritualization prevention, removing "just in case" items that maintain anxiety, panic, obsessive, and post-traumatic symptoms. \par 3. Exposure therapy involves any or all of these modalities: in vivo, imaginal, interoceptive, Virtual Reality, written/narrative, and analog in vivo exposure\par 4. Exposures will be conducted within psychotherapy sessions and assigned for homework between sessions\par Treatment plan for bodily focused repetitive behavior reviewed in session included the following:\par 1. Pt can benefit from understanding function of engaging in bodily focused repetitive behavior, including escape, attention, tangible and/or sensory.\par 2. Pt will learn how to control bodily focused repetitive behavior by altering environmental factors, engaging in replacement behaviors and reinforcing self for using replacement behaviors as opposed to bodily focused repetitive behavior.\par 3. Techniques will be practiced and assigned for homework between sessions. This will entail interventions such using monitoring forms to track progress and assess the efficacy of their implementation of techniques.\par

## 2022-10-17 ENCOUNTER — APPOINTMENT (OUTPATIENT)
Dept: HUMAN REPRODUCTION | Facility: CLINIC | Age: 37
End: 2022-10-17

## 2022-10-17 PROCEDURE — 99215 OFFICE O/P EST HI 40 MIN: CPT | Mod: 25

## 2022-10-17 PROCEDURE — 36415 COLL VENOUS BLD VENIPUNCTURE: CPT

## 2022-10-17 PROCEDURE — 76830 TRANSVAGINAL US NON-OB: CPT

## 2022-10-24 ENCOUNTER — APPOINTMENT (OUTPATIENT)
Dept: PSYCHIATRY | Facility: CLINIC | Age: 37
End: 2022-10-24

## 2022-10-24 ENCOUNTER — APPOINTMENT (OUTPATIENT)
Dept: HUMAN REPRODUCTION | Facility: CLINIC | Age: 37
End: 2022-10-24

## 2022-10-24 PROCEDURE — 99214 OFFICE O/P EST MOD 30 MIN: CPT | Mod: 95

## 2022-10-24 PROCEDURE — 90837 PSYTX W PT 60 MINUTES: CPT | Mod: 95

## 2022-10-24 NOTE — BEHAVIORAL HEALTH
[FreeTextEntry2] : CBT for obsessive compulsive disorder and excoriation disorder. [Cognitive and/or Behavior Therapy] : Cognitive and/or Behavior Therapy  [de-identified] : Pt was Ox3. Pt's affect was observed to be euthymic. No risk was observed or reported.\par \par Psychologist met with Pt  using teletherapy platform due to COVID-19 pandemic disaster. Pt was at her home, while psychologist was working remotely. Pt continued to provide consent for telehealth services.\par \par Pt completed HW of writing section for tx review sheet. In session, reviewed OCD and ERP edits and BFRB tx. Pt successfully added information about reinforcers, mindfulness and how to maintain progress with OCD sxs. HW assigned for pt to add edits to review sheet  including mindfulness. Next session will continue tx review by reviewing edits and discussing how to maintain progress independently. [FreeTextEntry1] : Treatment plan reviewed for psycho-education include the following:\par 1. Pt will learn about their symptoms and diagnosis and be able to explain it back to psychologist.\par 2. Pt will learn about the CBT model and be able to accurately categorize their symptoms and diagnosis in terms of cognition, emotional responses, behaviors, and physiological arousal.\par Treatment planning reviewed for exposure and response prevention included the following:\par 1. Pt can benefit from learning/continuing exposure therapy interventions, involving confronting situational, emotional, physiological, and cognitive avoided situations. \par 2. Pt can benefit from reducing and eliminating safety behaviors/objects, such as maladaptive response/ritualization prevention, removing "just in case" items that maintain anxiety, panic, obsessive, and post-traumatic symptoms. \par 3. Exposure therapy involves any or all of these modalities: in vivo, imaginal, interoceptive, Virtual Reality, written/narrative, and analog in vivo exposure\par 4. Exposures will be conducted within psychotherapy sessions and assigned for homework between sessions\par Treatment plan for bodily focused repetitive behavior reviewed in session included the following:\par 1. Pt can benefit from understanding function of engaging in bodily focused repetitive behavior, including escape, attention, tangible and/or sensory.\par 2. Pt will learn how to control bodily focused repetitive behavior by altering environmental factors, engaging in replacement behaviors and reinforcing self for using replacement behaviors as opposed to bodily focused repetitive behavior.\par 3. Techniques will be practiced and assigned for homework between sessions. This will entail interventions such using monitoring forms to track progress and assess the efficacy of their implementation of techniques.\par

## 2022-11-01 ENCOUNTER — APPOINTMENT (OUTPATIENT)
Dept: PSYCHIATRY | Facility: CLINIC | Age: 37
End: 2022-11-01

## 2022-11-01 DIAGNOSIS — F42.4 EXCORIATION (SKIN-PICKING) DISORDER: ICD-10-CM

## 2022-11-01 PROCEDURE — 90834 PSYTX W PT 45 MINUTES: CPT | Mod: 95

## 2022-11-01 NOTE — BEHAVIORAL HEALTH
[FreeTextEntry2] : CBT for obsessive compulsive disorder and excoriation disorder. [Cognitive and/or Behavior Therapy] : Cognitive and/or Behavior Therapy  [de-identified] : Pt was Ox3. Pt's affect was observed to be euthymic. No risk was observed or reported.\par \par Psychologist met with Pt  using teletherapy platform due to COVID-19 pandemic disaster. Pt was at her home, while psychologist was working remotely. Pt continued to provide consent for telehealth services.\par \par Pt completed HW of writing section for tx review sheet. In session, reviewed mindfulness and reinforcers for BFRB tx. Discussed how to maintain progress with OCD sxs including weekly monitoring. Discussed tx options should pt need additional support in the future. Encouraged pt to continue monitoring sxs and integrate ERP routinely. Completed YBOCS assessment. Total Score=9; Mild. Concluded tx. [FreeTextEntry1] : Treatment plan reviewed for psycho-education include the following:\par 1. Pt will learn about their symptoms and diagnosis and be able to explain it back to psychologist.\par 2. Pt will learn about the CBT model and be able to accurately categorize their symptoms and diagnosis in terms of cognition, emotional responses, behaviors, and physiological arousal.\par Treatment planning reviewed for exposure and response prevention included the following:\par 1. Pt can benefit from learning/continuing exposure therapy interventions, involving confronting situational, emotional, physiological, and cognitive avoided situations. \par 2. Pt can benefit from reducing and eliminating safety behaviors/objects, such as maladaptive response/ritualization prevention, removing "just in case" items that maintain anxiety, panic, obsessive, and post-traumatic symptoms. \par 3. Exposure therapy involves any or all of these modalities: in vivo, imaginal, interoceptive, Virtual Reality, written/narrative, and analog in vivo exposure\par 4. Exposures will be conducted within psychotherapy sessions and assigned for homework between sessions\par Treatment plan for bodily focused repetitive behavior reviewed in session included the following:\par 1. Pt can benefit from understanding function of engaging in bodily focused repetitive behavior, including escape, attention, tangible and/or sensory.\par 2. Pt will learn how to control bodily focused repetitive behavior by altering environmental factors, engaging in replacement behaviors and reinforcing self for using replacement behaviors as opposed to bodily focused repetitive behavior.\par 3. Techniques will be practiced and assigned for homework between sessions. This will entail interventions such using monitoring forms to track progress and assess the efficacy of their implementation of techniques.\par

## 2022-11-01 NOTE — BEHAVIORAL HEALTH
[FreeTextEntry2] : CBT for obsessive compulsive disorder and excoriation disorder. [Cognitive and/or Behavior Therapy] : Cognitive and/or Behavior Therapy  [de-identified] : Pt was Ox3. Pt's affect was observed to be euthymic. No risk was observed or reported.\par \par Psychologist met with Pt  using teletherapy platform due to COVID-19 pandemic disaster. Pt was at her home, while psychologist was working remotely. Pt continued to provide consent for telehealth services.\par \par Pt completed HW of writing section for tx review sheet. In session, reviewed mindfulness and reinforcers for BFRB tx. Discussed how to maintain progress with OCD sxs including weekly monitoring. Discussed tx options should pt need additional support in the future. Encouraged pt to continue monitoring sxs and integrate ERP routinely. Completed YBOCS assessment. Total Score=9; Mild. Concluded tx. [FreeTextEntry1] : Treatment plan reviewed for psycho-education include the following:\par 1. Pt will learn about their symptoms and diagnosis and be able to explain it back to psychologist.\par 2. Pt will learn about the CBT model and be able to accurately categorize their symptoms and diagnosis in terms of cognition, emotional responses, behaviors, and physiological arousal.\par Treatment planning reviewed for exposure and response prevention included the following:\par 1. Pt can benefit from learning/continuing exposure therapy interventions, involving confronting situational, emotional, physiological, and cognitive avoided situations. \par 2. Pt can benefit from reducing and eliminating safety behaviors/objects, such as maladaptive response/ritualization prevention, removing "just in case" items that maintain anxiety, panic, obsessive, and post-traumatic symptoms. \par 3. Exposure therapy involves any or all of these modalities: in vivo, imaginal, interoceptive, Virtual Reality, written/narrative, and analog in vivo exposure\par 4. Exposures will be conducted within psychotherapy sessions and assigned for homework between sessions\par Treatment plan for bodily focused repetitive behavior reviewed in session included the following:\par 1. Pt can benefit from understanding function of engaging in bodily focused repetitive behavior, including escape, attention, tangible and/or sensory.\par 2. Pt will learn how to control bodily focused repetitive behavior by altering environmental factors, engaging in replacement behaviors and reinforcing self for using replacement behaviors as opposed to bodily focused repetitive behavior.\par 3. Techniques will be practiced and assigned for homework between sessions. This will entail interventions such using monitoring forms to track progress and assess the efficacy of their implementation of techniques.\par

## 2022-11-01 NOTE — DISCUSSION/SUMMARY
[FreeTextEntry1] : ROHAN EASTMAN is a 37 year White female.\par \par Pt attended 46 sessions between July 7, 2021 through November 1, 2022.\par \par Pt presented to therapy reporting symptoms of OCD and skin picking. Pt reported sxs including checking, rearranging, reassurance seeking, intrusive thought, mental compulsions, rituals, organizing and avoiding. Pt reported sxs beginning when she was 8 or 9 years old. \par \par Treatment plan for exposure and response prevention included the following:\par 1. Pt benefited from learning/continuing exposure therapy interventions, involving confronting situational, emotional, physiological, and cognitive avoided situations. \par 2. Pt benefited from reducing and eliminating safety behaviors/objects, such as maladaptive response/ritualization prevention, removing "just in case" items that maintain anxiety, panic, obsessive, and post-traumatic symptoms. \par 3. Exposure therapy involved any or all of these modalities: in vivo, imaginal, interoceptive, written/narrative, and analog in vivo exposure\par 4. Exposures were conducted within psychotherapy sessions and assigned for homework between sessions\par \par Treatment plan for bodily focused repetitive behavior reviewed in session included the following:\par 1. Pt benefited from understanding function of engaging in bodily focused repetitive behavior, including escape, attention, tangible and/or sensory.\par 2. Pt learned how to control bodily focused repetitive behavior by altering environmental factors, engaging in replacement behaviors and reinforcing self for using replacement behaviors as opposed to bodily focused repetitive behavior.\par 3. Techniques were be practiced and assigned for homework between sessions. This entailed interventions such using monitoring forms to track progress and assess the efficacy of their implementation of techniques.\par \par Treatment plan for mindfulness reviewed in session included the following:\par \par 1. Pt benefited from learning different types of mind (Emotion, Rational and Wise). \par 2. Pt benefited from skills learning how to utilize components of mindfulness (What skills: Observe, Describe, Participate; How skills: Nonjudgmentally, One-Mindfully, Effectively).\par 3. Skill implementation was be practiced within psychotherapy sessions and assigned for homework between sessions.\par \par Concluded tx by reviewed what OCD is, the difference between obsessions and compulsions, the cycle of OCD, how ERP works, components of skin picking preventive plan and mindfulness. Discussed how to maintain progress and generalize skills in the future. Pt expressed understanding of maintaining weekly check in and tracking progress each month to ensure pt continues to use skills. Discussed alternate tx options if pt's sxs increases in the future and she wants return to therapy.\par \par

## 2022-11-01 NOTE — REASON FOR VISIT
[Patient] : patient, [unfilled] is a ~age~ year old ~male/female~ being seen for a follow-up visit  [Duration of Psychotherapy Visit (minutes spent in synchronous communication): ____] : Duration of Psychotherapy Visit (minutes spent in synchronous communication): [unfilled] [Individual Psychotherapy for 38-52 minutes] : Individual Psychotherapy for 38 - 52 minutes [Home] : at home, [unfilled] , at the time of the visit. [Other Location: e.g. Home (Enter Location, City,State)___] : at [unfilled] [Verbal consent obtained from patient] : the patient, [unfilled] [Completed treatment - additional care not necessary at this time] : Completed treatment - additional care not necessary at this time

## 2022-11-22 ENCOUNTER — RESULT REVIEW (OUTPATIENT)
Age: 37
End: 2022-11-22

## 2022-11-22 ENCOUNTER — APPOINTMENT (OUTPATIENT)
Dept: HEMATOLOGY ONCOLOGY | Facility: CLINIC | Age: 37
End: 2022-11-22

## 2022-11-22 VITALS
DIASTOLIC BLOOD PRESSURE: 72 MMHG | SYSTOLIC BLOOD PRESSURE: 132 MMHG | RESPIRATION RATE: 18 BRPM | BODY MASS INDEX: 23.95 KG/M2 | HEART RATE: 95 BPM | HEIGHT: 66 IN | TEMPERATURE: 98.9 F | WEIGHT: 149 LBS | OXYGEN SATURATION: 99 %

## 2022-11-22 DIAGNOSIS — R11.2 NAUSEA WITH VOMITING, UNSPECIFIED: ICD-10-CM

## 2022-11-22 PROCEDURE — 99214 OFFICE O/P EST MOD 30 MIN: CPT | Mod: 25

## 2022-11-22 PROCEDURE — 36415 COLL VENOUS BLD VENIPUNCTURE: CPT

## 2022-11-22 RX ORDER — AZELASTINE HYDROCHLORIDE 137 UG/1
137 SPRAY, METERED NASAL
Qty: 30 | Refills: 0 | Status: ACTIVE | COMMUNITY
Start: 2022-08-26

## 2022-11-22 RX ORDER — AZELAIC ACID 0.15 G/G
15 GEL TOPICAL
Qty: 50 | Refills: 0 | Status: ACTIVE | COMMUNITY
Start: 2022-07-11

## 2022-11-22 NOTE — HISTORY OF PRESENT ILLNESS
[de-identified] : Ms Cuevas is a very pleasant 32 year old patient with IBD here with anemia\par \par IBD/UC vs Crohns since 2011- gets flair ups - liaza 4 tab/day. Canaza supp\par Was recently on prednisone - stopped due to rash - 2-3 weeks\par \par LMP 4/5/18 - Are not heavy\par \par Every 2 months had small amount of blood in stool\par \par Feels tired and exhausted\par  [de-identified] : She is seen today for follow up. \par \par She co nausea vomiting\par FOllows with Dr Gonzalez - did work up which has been inconclusive\par Also has been having headaches- Saw Dr Sigala (ENT) and had skull based cyst taken out and DNS fixed. Did not help headaches\par Fatigue, occasional diarrhea with vomiting causing dehydration. Feels better with hydration

## 2022-11-22 NOTE — CONSULT LETTER
[Dear  ___] : Dear  [unfilled], [Consult Letter:] : I had the pleasure of evaluating your patient, [unfilled]. [Please see my note below.] : Please see my note below. [Consult Closing:] : Thank you very much for allowing me to participate in the care of this patient.  If you have any questions, please do not hesitate to contact me. [Sincerely,] : Sincerely, [DrAdams  ___] : Dr. YOUNG [FreeTextEntry3] : Sridhar Meneses MD, MPH\par Attending Physician\par Hematology Oncology\par [facility] [place]\par [facility] [place]\par

## 2022-11-22 NOTE — ASSESSMENT
[FreeTextEntry1] : IBD/ Premenopausal state without heavy bleeding\par Fatigue, tiredness, exhaustion, nausea, vomiting, headaches\par Suspect her fatigue is multifactorial \par However fatigue continues to affect her routine- ? related to inflammation related to IBD vs CTD vs fibromyalgia\par AIDEN speckled with 1:80\par Has appointment with Dr Rivera (rheumatology)\par \par Dehydration\par IV NS today and once a month\par \par Headaches\par MRI brain showed Nonspecific few scattered punctate foci of T2 and Flair signal hyperintensities within \par the white matter, unchanged; findings may be seen in patient with migraine headaches, vasculitis, \par demyelinating disease, Lyme disease and viral illness\par Follow with Dr Bhardwaj (neurology)\par \par Left Jugulodigastric LN\par 2 cm x 1.5 cm x 1 cm Incidentally noted on MRI brain\par Reactive in appearance\par Reassured. She will follow up on possible repeat imaging with neurology\par \par Follow up PRN  \par CBC, CMP, ESR, CRP, ferritin\par \par

## 2022-12-06 NOTE — ASSESSMENT
[FreeTextEntry1] : Suspect majority of sxs  related to IBS and life stress. Stool studies requested to exclude infection and inflamation\par \par 1. IBD: Continue Lialda 2 tabs daily\par \par 2. IBS: Levsin for intermittent LLQ  pain\par \par 3. C. diff: S/P fecal transplant...monitor for recurrence. Most recent stool was negative for  c. diff\par \par 4. Fecal incontinence:  Awaiting  manometry appointment  in [facility]  / [facility]
PAST SURGICAL HISTORY:  No significant past surgical history

## 2023-01-13 ENCOUNTER — NON-APPOINTMENT (OUTPATIENT)
Age: 38
End: 2023-01-13

## 2023-01-20 ENCOUNTER — RESULT REVIEW (OUTPATIENT)
Age: 38
End: 2023-01-20

## 2023-01-20 ENCOUNTER — APPOINTMENT (OUTPATIENT)
Dept: HEMATOLOGY ONCOLOGY | Facility: CLINIC | Age: 38
End: 2023-01-20
Payer: COMMERCIAL

## 2023-01-20 VITALS
TEMPERATURE: 98 F | WEIGHT: 146.13 LBS | BODY MASS INDEX: 23.48 KG/M2 | RESPIRATION RATE: 16 BRPM | OXYGEN SATURATION: 100 % | HEIGHT: 66 IN | SYSTOLIC BLOOD PRESSURE: 121 MMHG | HEART RATE: 94 BPM | DIASTOLIC BLOOD PRESSURE: 71 MMHG

## 2023-01-20 DIAGNOSIS — R79.89 OTHER SPECIFIED ABNORMAL FINDINGS OF BLOOD CHEMISTRY: ICD-10-CM

## 2023-01-20 DIAGNOSIS — D50.9 IRON DEFICIENCY ANEMIA, UNSPECIFIED: ICD-10-CM

## 2023-01-20 PROCEDURE — 99214 OFFICE O/P EST MOD 30 MIN: CPT | Mod: 25

## 2023-01-20 PROCEDURE — 36415 COLL VENOUS BLD VENIPUNCTURE: CPT

## 2023-01-20 RX ORDER — NITROFURANTOIN (MONOHYDRATE/MACROCRYSTALS) 25; 75 MG/1; MG/1
100 CAPSULE ORAL
Qty: 14 | Refills: 0 | Status: COMPLETED | COMMUNITY
Start: 2022-06-01 | End: 2023-01-20

## 2023-01-20 RX ORDER — BETAMETHASONE DIPROPIONATE 0.5 MG/G
0.05 CREAM TOPICAL
Qty: 15 | Refills: 0 | Status: COMPLETED | COMMUNITY
Start: 2022-06-30 | End: 2023-01-20

## 2023-01-20 RX ORDER — FLUCONAZOLE 150 MG/1
150 TABLET ORAL
Qty: 2 | Refills: 1 | Status: COMPLETED | COMMUNITY
Start: 2022-05-27 | End: 2023-01-20

## 2023-01-20 RX ORDER — CEFDINIR 300 MG/1
300 CAPSULE ORAL
Qty: 20 | Refills: 0 | Status: COMPLETED | COMMUNITY
Start: 2022-09-26 | End: 2023-01-20

## 2023-01-20 RX ORDER — PAROXETINE HYDROCHLORIDE 10 MG/1
10 TABLET, FILM COATED ORAL
Qty: 90 | Refills: 0 | Status: COMPLETED | COMMUNITY
Start: 2021-03-16 | End: 2023-01-20

## 2023-01-20 RX ORDER — TRAMADOL HYDROCHLORIDE 50 MG/1
50 TABLET, COATED ORAL
Qty: 30 | Refills: 0 | Status: COMPLETED | COMMUNITY
Start: 2022-06-07 | End: 2023-01-20

## 2023-01-20 RX ORDER — MESALAMINE 1.2 G/1
1.2 TABLET, DELAYED RELEASE ORAL
Qty: 180 | Refills: 3 | Status: COMPLETED | COMMUNITY
Start: 2020-11-30 | End: 2023-01-20

## 2023-01-20 RX ORDER — ONDANSETRON 4 MG/1
4 TABLET, ORALLY DISINTEGRATING ORAL EVERY 6 HOURS
Qty: 60 | Refills: 3 | Status: COMPLETED | COMMUNITY
Start: 2021-01-28 | End: 2023-01-20

## 2023-01-20 RX ORDER — SERTRALINE HYDROCHLORIDE 100 MG/1
100 TABLET, FILM COATED ORAL
Qty: 180 | Refills: 0 | Status: COMPLETED | COMMUNITY
Start: 2021-04-11 | End: 2023-01-20

## 2023-01-20 RX ORDER — DICYCLOMINE HYDROCHLORIDE 20 MG/1
20 TABLET ORAL EVERY 6 HOURS
Qty: 30 | Refills: 1 | Status: COMPLETED | COMMUNITY
Start: 2021-03-10 | End: 2023-01-20

## 2023-01-20 RX ORDER — MUPIROCIN 20 MG/G
2 OINTMENT TOPICAL
Qty: 66 | Refills: 0 | Status: COMPLETED | COMMUNITY
Start: 2022-11-07 | End: 2023-01-20

## 2023-01-20 RX ORDER — ESCITALOPRAM OXALATE 10 MG/1
10 TABLET ORAL
Qty: 30 | Refills: 0 | Status: COMPLETED | COMMUNITY
Start: 2020-08-03 | End: 2023-01-20

## 2023-01-20 RX ORDER — SERTRALINE HYDROCHLORIDE 50 MG/1
50 TABLET, FILM COATED ORAL
Qty: 135 | Refills: 0 | Status: COMPLETED | COMMUNITY
Start: 2021-06-18 | End: 2023-01-20

## 2023-01-20 RX ORDER — SERTRALINE HYDROCHLORIDE 50 MG/1
50 TABLET, FILM COATED ORAL
Qty: 45 | Refills: 0 | Status: COMPLETED | COMMUNITY
Start: 2021-03-28 | End: 2023-01-20

## 2023-01-20 RX ORDER — LEVOCETIRIZINE DIHYDROCHLORIDE 5 MG/1
5 TABLET ORAL
Qty: 30 | Refills: 0 | Status: COMPLETED | COMMUNITY
Start: 2022-04-21 | End: 2023-01-20

## 2023-01-20 RX ORDER — METHYLPREDNISOLONE 4 MG/1
4 TABLET ORAL
Qty: 21 | Refills: 0 | Status: COMPLETED | COMMUNITY
Start: 2022-06-02 | End: 2023-01-20

## 2023-01-20 RX ORDER — MESALAMINE 1.2 G/1
1.2 TABLET, DELAYED RELEASE ORAL
Qty: 180 | Refills: 3 | Status: COMPLETED | COMMUNITY
Start: 2021-01-28 | End: 2023-01-20

## 2023-01-20 RX ORDER — TIZANIDINE 2 MG/1
2 TABLET ORAL
Qty: 60 | Refills: 1 | Status: COMPLETED | COMMUNITY
Start: 2022-04-21 | End: 2023-01-20

## 2023-01-20 RX ORDER — TRIAMCINOLONE ACETONIDE 1 MG/G
0.1 CREAM TOPICAL
Qty: 1 | Refills: 1 | Status: COMPLETED | COMMUNITY
Start: 2021-04-30 | End: 2023-01-20

## 2023-01-20 RX ORDER — OXYCODONE AND ACETAMINOPHEN 5; 325 MG/1; MG/1
5-325 TABLET ORAL
Qty: 20 | Refills: 0 | Status: COMPLETED | COMMUNITY
Start: 2022-06-15 | End: 2023-01-20

## 2023-01-20 RX ORDER — OLANZAPINE 2.5 MG/1
2.5 TABLET, FILM COATED ORAL DAILY
Qty: 90 | Refills: 0 | Status: COMPLETED | COMMUNITY
Start: 2021-04-11 | End: 2023-01-20

## 2023-01-20 RX ORDER — LAMOTRIGINE 25-50-100
25 & 50 & 100 KIT ORAL
Qty: 1 | Refills: 0 | Status: COMPLETED | COMMUNITY
Start: 2021-03-26 | End: 2023-01-20

## 2023-01-20 RX ORDER — SERTRALINE 25 MG/1
25 TABLET, FILM COATED ORAL DAILY
Qty: 90 | Refills: 0 | Status: COMPLETED | COMMUNITY
Start: 2021-04-30 | End: 2023-01-20

## 2023-01-20 RX ORDER — HUMAN PAPILLOMAVIRUS 9-VALENT VACCINE, RECOMBINANT 30; 40; 60; 40; 20; 20; 20; 20; 20 UG/.5ML; UG/.5ML; UG/.5ML; UG/.5ML; UG/.5ML; UG/.5ML; UG/.5ML; UG/.5ML; UG/.5ML
INJECTION, SUSPENSION INTRAMUSCULAR
Qty: 1 | Refills: 0 | Status: COMPLETED | COMMUNITY
Start: 2022-05-27 | End: 2023-01-20

## 2023-01-20 RX ORDER — OXYCODONE 5 MG/1
5 TABLET ORAL
Qty: 20 | Refills: 0 | Status: COMPLETED | COMMUNITY
Start: 2022-09-26 | End: 2023-01-20

## 2023-01-20 NOTE — PHYSICAL EXAM
[Fully active, able to carry on all pre-disease performance without restriction] : Status 0 - Fully active, able to carry on all pre-disease performance without restriction [Thin] : thin [Normal] : bilateral breasts without nipple retraction, skin dimpling or palpable masses; the bilateral axillae are without adenopathy

## 2023-01-20 NOTE — ASSESSMENT
[FreeTextEntry1] : 37 year old female with US, iron deficiency anemia \par \par # IBD/ Premenopausal state without heavy bleeding\par Fatigue, tiredness, exhaustion, nausea, vomiting, headaches\par Suspect her fatigue is multifactorial \par However fatigue continues to affect her routine- ? related to inflammation related to IBD vs CTD vs fibromyalgia\par AIDEN speckled with 1:80\par Repeat ferritin, if trending down, injectafer x 1 prior to IVF\par \par #Dehydration\par IV NS today and once a month\par \par Headaches\par MRI brain showed Nonspecific few scattered punctate foci of T2 and Flair signal hyperintensities within \par the white matter, unchanged; findings may be seen in patient with migraine headaches, vasculitis, \par demyelinating disease, Lyme disease and viral illness\par Follow with Dr Bhardwaj (neurology)\par \par # Left Jugulodigastric LN om MRI 8/22\par 2 cm x 1.5 cm x 1 cm Incidentally noted on MRI brain\par Reactive in appearance\par Reviewed with radiologist - last MRI 1/2023- no evidence of adenopathy\par \par # Basal cell nose- derm following\par -start B3 500 mg PO BID\par #IVF\par - no period since June 2022\par \par # [place]/ [place] descent \par  MGM - colon cancer - 70\par PGM - ovarian ca- 68\par Maternal cousin - ovarian 37\par Maunt - breast ca age 40\par \par Patient offered genetic testing.\par We discussed:\par Plan for genetic panel.  \par Reviewed with patient impact of positive vs negative results and that test might not be informative or . \par We discussed that blood related family members might be carrying the same genetic mutation.\par Test confidentiality. \par Options or limitations of medical surveillance and strategies for prevention after genetic testing results.\par Importance of sharing genetic test results with at-risk relatives they might benefit from this information. \par Plan for follow up after testing\par \par # elevated LFTs, patient had diarrhea last week, possible post infectious, repeat LFt one week \par Follow up PRN  \par CBC, CMP, ESR, CRP, ferritin\par \par # Mamogram/ sono prior to IVF\par \par

## 2023-01-20 NOTE — CONSULT LETTER
[Dear  ___] : Dear  [unfilled], [Consult Letter:] : I had the pleasure of evaluating your patient, [unfilled]. [Please see my note below.] : Please see my note below. [Consult Closing:] : Thank you very much for allowing me to participate in the care of this patient.  If you have any questions, please do not hesitate to contact me. [Sincerely,] : Sincerely, [DrAdams  ___] : Dr. YOUNG [FreeTextEntry3] : Sridhar Meneses MD, MPH\par Attending Physician\par Hematology Oncology\par [facility] [place]\par [facility]\par

## 2023-01-20 NOTE — HISTORY OF PRESENT ILLNESS
[de-identified] : Ms Cuevas is a very pleasant 32 year old patient with IBD here with anemia\par \par IBD/UC vs Crohns since 2011- gets flair ups - liaza 4 tab/day. Canaza supp\par Was recently on prednisone - stopped due to rash - 2-3 weeks\par \par LMP 4/5/18 - Are not heavy\par \par Every 2 months had small amount of blood in stool\par \par Feels tired and exhausted\par  [de-identified] : She is seen today for follow up. \par \par She co nausea vomiting\par FOllows with Dr Gonzalez - did work up which has been inconclusive\par Also has been having headaches- Saw Dr Sigala (ENT) and had skull based cyst taken out and DNS fixed. Did not help headaches\par Fatigue, occasional diarrhea with vomiting causing dehydration. Feels better with hydration

## 2023-01-26 ENCOUNTER — APPOINTMENT (OUTPATIENT)
Dept: HEMATOLOGY ONCOLOGY | Facility: CLINIC | Age: 38
End: 2023-01-26

## 2023-01-26 ENCOUNTER — APPOINTMENT (OUTPATIENT)
Dept: FAMILY MEDICINE | Facility: CLINIC | Age: 38
End: 2023-01-26
Payer: COMMERCIAL

## 2023-01-26 ENCOUNTER — NON-APPOINTMENT (OUTPATIENT)
Age: 38
End: 2023-01-26

## 2023-01-26 ENCOUNTER — RESULT REVIEW (OUTPATIENT)
Age: 38
End: 2023-01-26

## 2023-01-26 VITALS
WEIGHT: 144 LBS | DIASTOLIC BLOOD PRESSURE: 66 MMHG | SYSTOLIC BLOOD PRESSURE: 110 MMHG | HEIGHT: 65 IN | OXYGEN SATURATION: 99 % | HEART RATE: 74 BPM | RESPIRATION RATE: 14 BRPM | BODY MASS INDEX: 23.99 KG/M2 | TEMPERATURE: 99.9 F

## 2023-01-26 VITALS
TEMPERATURE: 97.2 F | RESPIRATION RATE: 16 BRPM | OXYGEN SATURATION: 98 % | WEIGHT: 143.5 LBS | BODY MASS INDEX: 23.06 KG/M2 | HEART RATE: 78 BPM | DIASTOLIC BLOOD PRESSURE: 61 MMHG | SYSTOLIC BLOOD PRESSURE: 115 MMHG | HEIGHT: 65.98 IN

## 2023-01-26 DIAGNOSIS — Z87.09 PERSONAL HISTORY OF OTHER DISEASES OF THE RESPIRATORY SYSTEM: ICD-10-CM

## 2023-01-26 DIAGNOSIS — K51.90 ULCERATIVE COLITIS, UNSPECIFIED, W/OUT COMPLICATIONS: ICD-10-CM

## 2023-01-26 PROCEDURE — 99214 OFFICE O/P EST MOD 30 MIN: CPT | Mod: 25

## 2023-01-26 PROCEDURE — 92552 PURE TONE AUDIOMETRY AIR: CPT

## 2023-01-26 PROCEDURE — 99395 PREV VISIT EST AGE 18-39: CPT | Mod: 25

## 2023-01-26 PROCEDURE — 36415 COLL VENOUS BLD VENIPUNCTURE: CPT

## 2023-01-26 PROCEDURE — 99173 VISUAL ACUITY SCREEN: CPT | Mod: 59

## 2023-01-26 PROCEDURE — G0444 DEPRESSION SCREEN ANNUAL: CPT | Mod: 59

## 2023-01-26 PROCEDURE — 93000 ELECTROCARDIOGRAM COMPLETE: CPT | Mod: 59

## 2023-01-26 PROCEDURE — 82270 OCCULT BLOOD FECES: CPT

## 2023-01-27 PROBLEM — K51.90 ULCERATIVE COLITIS WITHOUT COMPLICATIONS, UNSPECIFIED LOCATION: Status: ACTIVE | Noted: 2018-11-28

## 2023-01-27 LAB
25(OH)D3 SERPL-MCNC: 53.1 NG/ML
ALBUMIN SERPL ELPH-MCNC: 4.4 G/DL
ALP BLD-CCNC: 92 U/L
ALT SERPL-CCNC: 35 U/L
ANION GAP SERPL CALC-SCNC: 12 MMOL/L
AST SERPL-CCNC: 23 U/L
BASOPHILS # BLD AUTO: 0.04 K/UL
BASOPHILS NFR BLD AUTO: 0.5 %
BILIRUB SERPL-MCNC: 0.7 MG/DL
BUN SERPL-MCNC: 7 MG/DL
CALCIUM SERPL-MCNC: 10.3 MG/DL
CHLORIDE SERPL-SCNC: 102 MMOL/L
CHOLEST SERPL-MCNC: 213 MG/DL
CO2 SERPL-SCNC: 25 MMOL/L
CREAT SERPL-MCNC: 0.66 MG/DL
DATE COLLECTED: NORMAL
EGFR: 114 ML/MIN/1.73M2
EOSINOPHIL # BLD AUTO: 0.02 K/UL
EOSINOPHIL NFR BLD AUTO: 0.3 %
FOLATE SERPL-MCNC: >20 NG/ML
GLUCOSE SERPL-MCNC: 86 MG/DL
HCT VFR BLD CALC: 43.9 %
HDLC SERPL-MCNC: 41 MG/DL
HEMOCCULT SP1 STL QL: NEGATIVE
HGB BLD-MCNC: 14.2 G/DL
IMM GRANULOCYTES NFR BLD AUTO: 0.3 %
LDLC SERPL CALC-MCNC: 139 MG/DL
LYMPHOCYTES # BLD AUTO: 2.6 K/UL
LYMPHOCYTES NFR BLD AUTO: 34.9 %
MAGNESIUM SERPL-MCNC: 2.2 MG/DL
MAN DIFF?: NORMAL
MCHC RBC-ENTMCNC: 30.9 PG
MCHC RBC-ENTMCNC: 32.3 GM/DL
MCV RBC AUTO: 95.4 FL
MONOCYTES # BLD AUTO: 0.6 K/UL
MONOCYTES NFR BLD AUTO: 8.1 %
NEUTROPHILS # BLD AUTO: 4.16 K/UL
NEUTROPHILS NFR BLD AUTO: 55.9 %
NONHDLC SERPL-MCNC: 172 MG/DL
PLATELET # BLD AUTO: 362 K/UL
POTASSIUM SERPL-SCNC: 4.1 MMOL/L
PROT SERPL-MCNC: 7.3 G/DL
QUALITY CONTROL: YES
RBC # BLD: 4.6 M/UL
RBC # FLD: 11.9 %
SODIUM SERPL-SCNC: 139 MMOL/L
TRIGL SERPL-MCNC: 164 MG/DL
TSH SERPL-ACNC: 1.11 UIU/ML
URATE SERPL-MCNC: 4.9 MG/DL
VIT B12 SERPL-MCNC: 458 PG/ML
WBC # FLD AUTO: 7.44 K/UL

## 2023-01-27 NOTE — PHYSICAL EXAM
[Snellen] : acuity screening with Snellen chart [No Acute Distress] : no acute distress [Well Nourished] : well nourished [Well Developed] : well developed [Well-Appearing] : well-appearing [Normal Voice/Communication] : normal voice/communication [Normal Sclera/Conjunctiva] : normal sclera/conjunctiva [PERRL] : pupils equal round and reactive to light [EOMI] : extraocular movements intact [20/___] : right eye 20/[unfilled] [Normal Outer Ear/Nose] : the outer ears and nose were normal in appearance [Normal Oropharynx] : the oropharynx was normal [No JVD] : no jugular venous distention [No Lymphadenopathy] : no lymphadenopathy [Supple] : supple [Thyroid Normal, No Nodules] : the thyroid was normal and there were no nodules present [No Respiratory Distress] : no respiratory distress  [No Accessory Muscle Use] : no accessory muscle use [Clear to Auscultation] : lungs were clear to auscultation bilaterally [Normal Rate] : normal rate  [Regular Rhythm] : with a regular rhythm [Normal S1, S2] : normal S1 and S2 [No Murmur] : no murmur heard [No Carotid Bruits] : no carotid bruits [No Abdominal Bruit] : a ~M bruit was not heard ~T in the abdomen [No Varicosities] : no varicosities [Pedal Pulses Present] : the pedal pulses are present [No Edema] : there was no peripheral edema [No Palpable Aorta] : no palpable aorta [No Extremity Clubbing/Cyanosis] : no extremity clubbing/cyanosis [Soft] : abdomen soft [Non Tender] : non-tender [Non-distended] : non-distended [No HSM] : no HSM [Normal Bowel Sounds] : normal bowel sounds [Normal Posterior Cervical Nodes] : no posterior cervical lymphadenopathy [Normal Anterior Cervical Nodes] : no anterior cervical lymphadenopathy [No CVA Tenderness] : no CVA  tenderness [No Spinal Tenderness] : no spinal tenderness [No Joint Swelling] : no joint swelling [Grossly Normal Strength/Tone] : grossly normal strength/tone [No Rash] : no rash [Coordination Grossly Intact] : coordination grossly intact [No Focal Deficits] : no focal deficits [Normal Gait] : normal gait [Deep Tendon Reflexes (DTR)] : deep tendon reflexes were 2+ and symmetric [Normal Affect] : the affect was normal [Normal Insight/Judgement] : insight and judgment were intact [Fundoscopic Exam Performed] : fundoscopic ~T exam ~C was performed [Normal TMs] : both tympanic membranes were normal [Normal Nasal Mucosa] : the nasal mucosa was normal [Normal Percussion] : the chest was normal to percussion [Normal Appearance] : normal in appearance [No Masses] : no palpable masses [No Nipple Discharge] : no nipple discharge [No Axillary Lymphadenopathy] : no axillary lymphadenopathy [No Hernias] : no hernias [External Female Genitalia] : normal external genitalia [Normal Supraclavicular Nodes] : no supraclavicular lymphadenopathy [Normal Axillary Nodes] : no axillary lymphadenopathy [Normal Inguinal Nodes] : no inguinal lymphadenopathy [Normal Femoral Nodes] : no femoral lymphadenopathy [No Skin Lesions] : no skin lesions [Speech Grossly Normal] : speech grossly normal [Memory Grossly Normal] : memory grossly normal [Alert and Oriented x3] : oriented to person, place, and time [Normal Mood] : the mood was normal [Peripheral Vision Was Full To Confrontation Bilaterally] : visual fields were full to confrontation bilaterally [Kyphosis] : no kyphosis [Scoliosis] : no scoliosis [Acne] : no acne [FreeTextEntry1] : Deferred

## 2023-01-27 NOTE — HISTORY OF PRESENT ILLNESS
[FreeTextEntry1] : Complete Physical Examination. [de-identified] : 37-year-old  female presents for Complete Physical Examination. Feels well in general. No new symptoms. Tolerating all prescription medications. Non-smoker. Social alcohol intake only. Immunizations are up-to-date. All preventative services were reviewed in detail, and appear to be current for age. Followed by multiple medical specialties.

## 2023-01-27 NOTE — HEALTH RISK ASSESSMENT
[Never] : Never [HIV test declined] : HIV test declined [Hepatitis C test declined] : Hepatitis C test declined [None] : None [Alone] : lives alone [Employed] : employed [Feels Safe at Home] : Feels safe at home [Fully functional (bathing, dressing, toileting, transferring, walking, feeding)] : Fully functional (bathing, dressing, toileting, transferring, walking, feeding) [Fully functional (using the telephone, shopping, preparing meals, housekeeping, doing laundry, using] : Fully functional and needs no help or supervision to perform IADLs (using the telephone, shopping, preparing meals, housekeeping, doing laundry, using transportation, managing medications and managing finances) [Smoke Detector] : smoke detector [Carbon Monoxide Detector] : carbon monoxide detector [Seat Belt] :  uses seat belt [Sunscreen] : uses sunscreen [TB Exposure] : is being exposed to tuberculosis [Very Good] : ~his/her~  mood as very good [Intercurrent ED visits] : went to ED [No] : In the past 12 months have you used drugs other than those required for medical reasons? No [0] : 2) Feeling down, depressed, or hopeless: Not at all (0) [PHQ-2 Negative - No further assessment needed] : PHQ-2 Negative - No further assessment needed [No Retinopathy] : No retinopathy [Never (0 pts)] : Never (0 points) [# of Members in Household ___] :  household currently consist of [unfilled] member(s) [] :  [# Of Children ___] : has [unfilled] children [Any fall with injury in past year] : Patient reported fall with injury in the past year [Patient reported bone density results were abnormal] : Patient reported bone density results were abnormal [Graduate School] : graduate school [Reports normal functional visual acuity (ie: able to read med bottle)] : Reports normal functional visual acuity [de-identified] : heel fracture at work [Audit-CScore] : 0 [de-identified] : Walking, free weights, eliptical, treeadmill [de-identified] : Balanced [de-identified] : Left heel fracture at work-02/22 [[id number]] : 0 [EyeExamDate] : 01/23 [Change in mental status noted] : No change in mental status noted [Language] : denies difficulty with language [Behavior] : denies difficulty with behavior [Learning/Retaining New Information] : denies difficulty learning/retaining new information [Handling Complex Tasks] : denies difficulty handling complex tasks [Reasoning] : denies difficulty with reasoning [Spatial Ability and Orientation] : denies difficulty with spatial ability and orientation [Sexually Active] : not sexually active [High Risk Behavior] : no high risk behavior [Reports changes in hearing] : Reports no changes in hearing [Reports changes in vision] : Reports no changes in vision [Reports changes in dental health] : Reports no changes in dental health [Guns at Home] : no guns at home [Safety elements used in home] : no safety elements used in home [Travel to Developing Areas] : does not  travel to developing areas [MammogramDate] : 12/20 [MammogramComments] : Appointment for 3/23 [PapSmearDate] : 05/22 [BoneDensityDate] : 10/22 [BoneDensityComments] : (+)osteopenia [ColonoscopyDate] : 05/21 [ColonoscopyComments] : Appointment for 2/23 [de-identified] : 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

## 2023-01-27 NOTE — REVIEW OF SYSTEMS
[Fever] : no fever [Chills] : no chills [Fatigue] : no fatigue [Hot Flashes] : no hot flashes [Night Sweats] : no night sweats [Recent Change In Weight] : ~T no recent weight change [Discharge] : no discharge [Pain] : no pain [Redness] : no redness [Dryness] : no dryness  [Vision Problems] : no vision problems [Itching] : no itching [Earache] : no earache [Hearing Loss] : no hearing loss [Nosebleed] : no nosebleeds [Hoarseness] : no hoarseness [Nasal Discharge] : no nasal discharge [Sore Throat] : no sore throat [Postnasal Drip] : no postnasal drip [Chest Pain] : no chest pain [Palpitations] : no palpitations [Leg Claudication] : no leg claudication [Lower Ext Edema] : no lower extremity edema [Orthopnea] : no orthopnea [Paroxysmal Nocturnal Dyspnea] : no paroxysmal nocturnal dyspnea [Shortness Of Breath] : no shortness of breath [Wheezing] : no wheezing [Cough] : no cough [Dyspnea on Exertion] : no dyspnea on exertion [Abdominal Pain] : no abdominal pain [Nausea] : no nausea [Constipation] : no constipation [Diarrhea] : diarrhea [Vomiting] : no vomiting [Heartburn] : no heartburn [Melena] : no melena [Dysuria] : no dysuria [Incontinence] : no incontinence [Nocturia] : no nocturia [Poor Libido] : libido not poor [Hematuria] : no hematuria [Frequency] : no frequency [Vaginal Discharge] : no vaginal discharge [Dysmenorrhea] : no dysmenorrhea [Joint Pain] : no joint pain [Joint Stiffness] : no joint stiffness [Joint Swelling] : no joint swelling [Muscle Weakness] : no muscle weakness [Muscle Pain] : no muscle pain [Back Pain] : no back pain [Itching] : no itching [Mole Changes] : no mole changes [Nail Changes] : no nail changes [Hair Changes] : no hair changes [Skin Rash] : no skin rash [Headache] : no headache [Dizziness] : no dizziness [Fainting] : no fainting [Memory Loss] : no memory loss [Unsteady Walking] : no ataxia [Suicidal] : not suicidal [Insomnia] : no insomnia [Anxiety] : no anxiety [Depression] : no depression [Easy Bleeding] : no easy bleeding [Easy Bruising] : no easy bruising [Swollen Glands] : no swollen glands

## 2023-01-29 LAB
APPEARANCE: CLEAR
BACTERIA UR CULT: NORMAL
BACTERIA: NEGATIVE
BILIRUBIN URINE: NEGATIVE
BLOOD URINE: NEGATIVE
COLOR: YELLOW
ESTIMATED AVERAGE GLUCOSE: 117 MG/DL
GLUCOSE QUALITATIVE U: NEGATIVE
HBA1C MFR BLD HPLC: 5.7 %
HYALINE CASTS: 0 /LPF
KETONES URINE: ABNORMAL
LEUKOCYTE ESTERASE URINE: NEGATIVE
MICROSCOPIC-UA: NORMAL
NITRITE URINE: NEGATIVE
PH URINE: 7
PROTEIN URINE: NEGATIVE
RED BLOOD CELLS URINE: 2 /HPF
SPECIFIC GRAVITY URINE: 1.02
SQUAMOUS EPITHELIAL CELLS: 15 /HPF
UROBILINOGEN URINE: NORMAL
WHITE BLOOD CELLS URINE: 50 /HPF

## 2023-02-02 ENCOUNTER — APPOINTMENT (OUTPATIENT)
Dept: DERMATOLOGY | Facility: CLINIC | Age: 38
End: 2023-02-02
Payer: COMMERCIAL

## 2023-02-02 VITALS — HEIGHT: 66 IN | BODY MASS INDEX: 22.5 KG/M2 | WEIGHT: 140 LBS

## 2023-02-02 DIAGNOSIS — L30.8 OTHER SPECIFIED DERMATITIS: ICD-10-CM

## 2023-02-02 PROCEDURE — 99203 OFFICE O/P NEW LOW 30 MIN: CPT

## 2023-02-02 RX ORDER — TRIAMCINOLONE ACETONIDE 1 MG/G
0.1 OINTMENT TOPICAL
Qty: 1 | Refills: 0 | Status: ACTIVE | COMMUNITY
Start: 2023-02-02 | End: 1900-01-01

## 2023-02-02 NOTE — PHYSICAL EXAM
[Alert] : alert [Oriented x 3] : ~L oriented x 3 [FreeTextEntry3] : Focused exam performed. Findings notable for:\par Erythematous scaly plaque with cracked riverbed appearance on R calf\par No burrows in finger webs, around breast, umbilicus

## 2023-02-02 NOTE — ASSESSMENT
[FreeTextEntry1] : # Asteatotic eczema\par - START triamcinolone 0.1% ointment, applied to affected area BID; no more than 2 weeks at a time\par - SED including atrophy, dyspigmentation, telangiectasias, and striae\par - Proper use reviewed including use only in recommended sites and avoidance of sustained and prolonged use\par - Continue moisturizing with Cerave\par \par FU PRN\par

## 2023-02-02 NOTE — HISTORY OF PRESENT ILLNESS
[de-identified] : # Rash\par Onset:1 week \par Location: legs, arms \par Symptoms: itch\par Aggravating factors: stress. 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

## 2023-03-02 ENCOUNTER — RESULT REVIEW (OUTPATIENT)
Age: 38
End: 2023-03-02

## 2023-03-04 DIAGNOSIS — R92.8 OTHER ABNORMAL AND INCONCLUSIVE FINDINGS ON DIAGNOSTIC IMAGING OF BREAST: ICD-10-CM

## 2023-03-07 ENCOUNTER — RESULT REVIEW (OUTPATIENT)
Age: 38
End: 2023-03-07

## 2023-03-08 DIAGNOSIS — N63.20 UNSPECIFIED LUMP IN THE LEFT BREAST, UNSPECIFIED QUADRANT: ICD-10-CM

## 2023-03-14 ENCOUNTER — RESULT REVIEW (OUTPATIENT)
Age: 38
End: 2023-03-14

## 2023-03-14 ENCOUNTER — APPOINTMENT (OUTPATIENT)
Dept: HUMAN REPRODUCTION | Facility: CLINIC | Age: 38
End: 2023-03-14
Payer: COMMERCIAL

## 2023-03-14 PROCEDURE — 99214 OFFICE O/P EST MOD 30 MIN: CPT | Mod: 95

## 2023-03-22 ENCOUNTER — NON-APPOINTMENT (OUTPATIENT)
Age: 38
End: 2023-03-22

## 2023-03-22 ENCOUNTER — APPOINTMENT (OUTPATIENT)
Dept: BREAST CENTER | Facility: CLINIC | Age: 38
End: 2023-03-22
Payer: COMMERCIAL

## 2023-03-22 VITALS
WEIGHT: 140 LBS | SYSTOLIC BLOOD PRESSURE: 127 MMHG | BODY MASS INDEX: 22.5 KG/M2 | HEIGHT: 66 IN | DIASTOLIC BLOOD PRESSURE: 79 MMHG | HEART RATE: 80 BPM

## 2023-03-22 DIAGNOSIS — R92.2 INCONCLUSIVE MAMMOGRAM: ICD-10-CM

## 2023-03-22 DIAGNOSIS — N60.19 DIFFUSE CYSTIC MASTOPATHY OF UNSPECIFIED BREAST: ICD-10-CM

## 2023-03-22 PROCEDURE — 99204 OFFICE O/P NEW MOD 45 MIN: CPT

## 2023-03-22 NOTE — REVIEW OF SYSTEMS
[Recent Weight Gain (___ Lbs)] : recent [unfilled] ~Ulb weight gain [Recent Weight Loss (___ Lbs)] : recent [unfilled] ~Ulb weight loss [Abdominal Pain] : abdominal pain [Vomiting] : vomiting [Diarrhea] : diarrhea [Heartburn] : heartburn [Itching] : itching [Breast Lump] : breast lump [Anxiety] : anxiety [Depression] : depression [Negative] : Heme/Lymph

## 2023-03-24 NOTE — ASSESSMENT
[FreeTextEntry1] : 38 yo female with benign left breast pathology who desires excision of benign mass so that she does not have to worry about it when she becomes pregnant.\par \par I explained that it does not increase her risk of breast cancer. We reviewed the option of observation alone. We also reviewed the procedure of localization (magseed) and excision. We reviewed postop care and recovery. We reviewed the risks of infection, bleeding, hematoma, seroma, deformity, scarring and change of sensation particularly in the nipple with possible loss of sensation. We also discussed possibility of impaired breast feeding. I outlined the procedure and what she should expect on the day of surgery. She understands all of the above and her questions have been answered. She will obtain medical clearance.\par

## 2023-03-24 NOTE — CONSULT LETTER
[Dear  ___] : Dear  [unfilled], [( Thank you for referring [unfilled] for consultation for _____ )] : Thank you for referring [unfilled] for consultation for [unfilled] [Please see my note below.] : Please see my note below. [Consult Closing:] : Thank you very much for allowing me to participate in the care of this patient.  If you have any questions, please do not hesitate to contact me. [Sincerely,] : Sincerely, [FreeTextEntry3] : Bella Leon MS DO\par Breast Surgeon\par [facility] \[place] [address]\par  [DrAdams  ___] : Dr. YOUNG

## 2023-03-24 NOTE — PHYSICAL EXAM
[Normocephalic] : normocephalic [Atraumatic] : atraumatic [Supple] : supple [No Supraclavicular Adenopathy] : no supraclavicular adenopathy [Examined in the supine and seated position] : examined in the supine and seated position [Symmetrical] : symmetrical [No dominant masses] : no dominant masses in right breast  [No dominant masses] : no dominant masses left breast [No Nipple Retraction] : no left nipple retraction [No Nipple Discharge] : no left nipple discharge [No Axillary Lymphadenopathy] : no left axillary lymphadenopathy [No Edema] : no edema [No Rashes] : no rashes [No Ulceration] : no ulceration [de-identified] : pectus excavatum [de-identified] : SHAKIRA's LOLYQ [de-identified] : SHAKIRA's JACK

## 2023-03-24 NOTE — HISTORY OF PRESENT ILLNESS
[FreeTextEntry1] : This is a 37 year old female referred by Dr. Mcrae for surgical removal of left breast fibroadenoma.\par \par Patient is s/p left MRI-guided core needle biopsy for abnormality on breast imaging UIQ (hourglass clip) at [place] on 3/14/23 which showed benign breast tissue with nodular foci of adenosis and fibroadenomatoid change. Results were benign and CONCORDANT. Dr. Mcrae had initially ordered imaging for palpable abnormality in the upper outer right breast, which was not definitely correlated with on the routine MG/US of 3/4/23 and thus additional imaging was obtained. She is undergoing IVF (2 rounds) and would like to have the lesion removed before she becomes pregnant. \par She works as a nurse at [employer]. She presents with her mother for consultation.\par \par She does SBE.\par She has not noticed a change in her breast or a breast lump.\par She has noticed a change in her nipple or nipple area, particularly the Ricardo glands.\par She has not noticed a change in the skin of the breast.\par She is not experiencing nipple discharge.\par She is not experiencing breast pain.\par She has not noticed a lump or lymph node under the armpit.\par \par \par BREAST CANCER RISK FACTORS\par Menarche: 15\par Date of LMP: 6/5/2022\par Menopause: Post age 36\par Grav: 1    Para: 0 \par Age at first live birth: ectopic pregnancy 6/2009\par Nursed: n/a\par Hysterectomy: N\par Oophorectomy: N\par OCP: Y 16 years\par HRT: IVF medications in 2020 and 2021\par Last pap/pelvic exam: 2022 HPV+\par Related family history:\par Ashkenazi: N\par Mastery risk assessment: BRCAPRO 25.8% TCv7 16.9% TCv8 16.0% Cecilia 12.5% Elvin 14.4%\par BRCA testing: Inv[employer]ae VUS POLE (c.1282G>A)\par Bra size: 34B\par \par  \par \par Last mammogram:  3/4/23                  Location: [facility]\par Report reviewed.                               Images reviewed.\par Results: BIRADS 0\par Left breast: No mammographic evidence of malignancy\par Right breast: Vague increased density in the right central breast when compared to the left breast, nonspecific.\par Contrast enhanced MRI is recommended for further evaluation.\par \par Last ultrasound: 3/4/23                        Location: [facility]\par Report reviewed.                                 Images reviewed. \par Results: BIRADS 0\par Left breast: No sonographicc evidence of malignancy\par Right breast: Vague increased density in the right central breast when compared to the left breast, nonspecific.\par Contrast enhanced MRI is recommended for further evaluation.\par \par  Last MRI:  3/7/23  WITH & W/O CONTRAST        Location: [facility]\par Report reviewed.\par Results: BIRADS 3\par Subcentimeter T2 hyperintense mass in the left breast. This finding likely represents a benign fibroadenoma versus an intramammary lymph node. Short term MR follow up is recommended in 6 months to ensure stability of this finding on this baseline study.\par No MRI evidence of malignancy in the right breast\par

## 2023-03-30 ENCOUNTER — RESULT REVIEW (OUTPATIENT)
Age: 38
End: 2023-03-30

## 2023-03-30 RX ORDER — GUAIFENESIN AND CODEINE PHOSPHATE 10; 100 MG/5ML; MG/5ML
100-10 SOLUTION ORAL
Qty: 120 | Refills: 1 | Status: ACTIVE | COMMUNITY
Start: 2023-03-30 | End: 1900-01-01

## 2023-03-31 ENCOUNTER — APPOINTMENT (OUTPATIENT)
Dept: FAMILY MEDICINE | Facility: CLINIC | Age: 38
End: 2023-03-31
Payer: COMMERCIAL

## 2023-03-31 VITALS
HEIGHT: 66 IN | DIASTOLIC BLOOD PRESSURE: 68 MMHG | HEART RATE: 83 BPM | RESPIRATION RATE: 15 BRPM | SYSTOLIC BLOOD PRESSURE: 116 MMHG | TEMPERATURE: 99.6 F | WEIGHT: 138 LBS | BODY MASS INDEX: 22.18 KG/M2 | OXYGEN SATURATION: 99 %

## 2023-03-31 PROCEDURE — 99214 OFFICE O/P EST MOD 30 MIN: CPT

## 2023-03-31 NOTE — REVIEW OF SYSTEMS
[facility] ambulatory encounter  FAMILY PRACTICE ANNUAL PHYSICAL EXAM    CHIEF COMPLAINT:  Physical       HISTORY OF PRESENT ILLNESS:    Rachel Kelly is a pleasant 48 year old female who presents today for physical    New concerns discussed include: arthralgia no swelling and occ stiffer than normal for months    PROBLEM LIST:    Patient Active Problem List   Diagnosis   • Migraine without aura, without mention of intractable migraine without mention of status migrainosus   • Pigmented skin lesion       HISTORIES:    I have reviewed the past medical history, family history, social history, medications and allergies listed in the medical record as obtained by my nursing staff and support staff and agree with their documentation.  ALLERGIES:   Allergen Reactions   • No Known Drug Allergies      Current Outpatient Medications   Medication Sig Dispense Refill   • SUMAtriptan (IMITREX) 50 MG tablet TAKE 1 TABLET BY MOUTH AT ONSET OF MIGRAINE. MAY REPEAT AFTER 2 HOURS AS NEEDED 9 tablet 5   • albuterol 108 (90 Base) MCG/ACT inhaler Inhale 1 puff into the lungs every 4 hours as needed for Shortness of Breath or Wheezing. 1 each 1   • Aspirin-Acetaminophen-Caffeine (EXCEDRIN MIGRAINE PO)      • Psyllium (METAMUCIL PO)      • Calcium Polycarbophil (ANKIT FIBER PO)      • ferrous sulfate 325 (65 FE) MG EC tablet Take 325 mg by mouth 3 times daily (with meals).       No current facility-administered medications for this visit.     Immunization History   Administered Date(s) Administered   • COVID Lucille/Franklin & Franklin 18Y+ 04/16/2021   • COVID Moderna 0.5 mL 12Y+ 12/04/2021   • Influenza, quadrivalent, preserve-free 10/13/2015   • Influenza, seasonal, injectable, trivalent 11/28/2008, 10/15/2010, 12/30/2011   • Novel Influenza N3V3-63 12/01/2009   • Novel Influenza P8E6-02, Unspecified Formulation 12/01/2009   • Tdap 02/15/2013   • Varicella 04/15/2002, 05/17/2002     Past Medical History:   Diagnosis Date   •  Diverticulosis of colon    • Gastroesophageal reflux disease    • Hemorrhoids    • Migraine without aura, without mention of intractable migraine without mention of status migrainosus 1994   • Pneumatosis coli 07/19/2018    dr santos     Past Surgical History:   Procedure Laterality Date   • Colonoscopy  07/19/2018    dr santos  Normal 10 Year Recall   • Pap liquid based w/screening  4/21/2010     Social History     Tobacco Use   • Smoking status: Never   • Smokeless tobacco: Never   Vaping Use   • Vaping Use: never used   Substance Use Topics   • Alcohol use: No   • Drug use: No     Social History     Tobacco Use   Smoking Status Never   Smokeless Tobacco Never     Social History     Substance and Sexual Activity   Alcohol Use No     Family History   Problem Relation Age of Onset   • Diabetes Father        REVIEW OF SYSTEMS:    Constitutional: Negative for fever, chills, change in appetite or fatigue.  Skin: Negative for rash or wounds.   HEENT: Negative for eye drainage, rhinorrhea, ear pain, sore throat or neck pain.  Respiratory: Negative for cough, wheezing or shortness of breath.    Cardiovascular: Negative for chest pain, chest pressure, palpitations or diaphoresis.   Gastrointestinal: Negative for nausea, vomiting, diarrhea, abdominal pain, black or tarry stools.  Genitourinary: Negative for dysuria, urgency, frequency, hematuria or flank pain.  Extremities: Negative for joint swelling or joint pain.  Neurologic: Negative for change in sensory or motor function.  Negative for headache, change in gait, vertigo, vision or speech.  Endocrine: Negative for heat or cold intolerance, weight loss or gain.  Hematological: Negative for bleeding, bruising or lymphadenopathy.  Psychiatric: Negative for change in affect, anxiety, depression, insomnia, mentation or sleep disturbance.     PHYSICAL EXAM:    Vital Signs:    Visit Vitals  /81 (BP Location: RUE - Right upper extremity, Patient Position: Sitting, Cuff Size:  Regular)   Pulse 81   Temp 97.5 °F (36.4 °C) (Oral)   Resp 16   Ht 5' 8\" (1.727 m)   Wt 92.1 kg (203 lb 0.7 oz)   LMP 03/10/2023 (Exact Date)   SpO2 100%   BMI 30.87 kg/m²     Pulse Ox Interpretation:  Within normal limits.  General:   Alert, cooperative, conversive in no acute distress.  Skin:  Warm and dry without rash.    Head:  Normocephalic, atraumatic.   Neck:  Trachea is midline. No adenopathy.  Normal thyroid without mass or tenderness..   Eyes:  Normal conjunctivae and sclerae.  Pupils equal, round and reactive to light.  Extraocular movements intact.  ENT:  Mucous membranes are moist.  Normal tympanic membranes and external auditory canals bilaterally.  No pharyngeal erythema or exudate.  No facial tenderness.  Normal nasal mucosa.  Cardiovascular:  Symmetrical pulses.  Regular rate and rhythm without murmur.  Respiratory:  Normal respiratory effort.  Clear to auscultation.  No wheezes, rales or rhonchi.  Gastrointestinal:  Soft and non tender.  Normal bowel sounds.  No hepatomegaly or splenomegaly.   Musculoskeletal:  No deformity or edema.  No tenderness to palpation.  Back:   Normal alignment.  No costovertebral angle tenderness or midline bony tenderness.  Neurologic:   Oriented x4.  Cranial nerves II-XII are intact.  No focal deficits or lateralizing signs.  Psychiatric:   Cooperative.  Appropriate mood and affect.    LABORATORY DATA:    All pertinent laboratory results were reviewed.    IMAGING STUDIES:    N/A    Body mass index is 30.87 kg/m².    BMI ASSESSMENT/PLAN:  Patient is obese.    Journal food intake daily and Join health club       Recent Review Flowsheet Data     Date 12/14/2022    Adult PHQ 2 Score 0    Adult PHQ 2 Interpretation No further screening needed    Little interest or pleasure in activity? Not at all    Feeling down, depressed or hopeless? Not at all          DEPRESSION ASSESSMENT/PLAN:  Depression screening is negative no further plan needed.    ASSESSMENT:    1. Routine  history and physical examination of adult    2. Need for vaccination    3. Arthralgia, unspecified joint    4. Migraine without aura and without status migrainosus, not intractable        PLAN:    Orders Placed This Encounter   • HEP B VACC ADULT 3 DOSE, IM   • ZOSTER SHINGLES RECOMBINANT ADJUVANTED IM(SHINGRIX)   • Hep B Vacc Adult 3-Dose - IMM28   • Tdap 10+ yrs (Boostrix) - VPX085G   • Comprehensive Metabolic Panel   • CBC with Automated Differential   • Glycohemoglobin   • Lipid Panel With Reflex   • Thyroid Stimulating Hormone Reflex   • Urinalysis With Microscopy & Culture If Indicated   • Hepatitis C Antibody With Reflex   • C Reactive Protein   • Rheumatoid Factor   • Cyclic Citrullinated Peptide Antibody IgG & IgA   • Sedimentation Rate   • SUMAtriptan (IMITREX) 50 MG tablet   Routine history and physical examination of adult  (primary encounter diagnosis)  Plan: COMPREHENSIVE METABOLIC PANEL, CBC WITH         DIFFERENTIAL, GLYCOHEMOGLOBIN, LIPID PANEL WITH        REFLEX, THYROID STIMULATING HORMONE REFLEX,         URINALYSIS WITH MICROSCOPY & CULTURE IF         INDICATED, HEPATITIS C ANTIBODY WITH REFLEX    Need for vaccination  Plan: HEP B VACC ADULT 3 DOSE, IM, ZOSTER SHINGLES         RECOMBINANT ADJUVANTED IM(SHINGRIX), HEP B VACC        ADULT 3 DOSE, IM, TETANUS DIPHTHERIA ACELLULAR         PERTUSSIS VACC, 10+ YRS (BOOSTRIX)    Arthralgia, unspecified joint  Plan: C REACTIVE PROTEIN, RHEUMATOID FACTOR, CYCLIC         CITRULLINATED PEPTIDE ANTIBODY IGG & IGA,         SEDIMENTATION RATE    Migraine without aura and without status migrainosus, not intractable  Plan: SUMAtriptan (IMITREX) 50 MG tablet        Return in about 1 year (around 3/31/2024).    Screenings/Treatments Needed:  Lab ordered     Instructions provided as documented in the after visit summary.    The patient indicated understanding of the diagnosis and agreed with the plan of care.          [Fatigue] : fatigue [Abdominal Pain] : abdominal pain [Diarrhea] : diarrhea [Negative] : Heme/Lymph [Nausea] : no nausea [Constipation] : no constipation [Vomiting] : no vomiting [Heartburn] : no heartburn [Melena] : no melena

## 2023-04-01 LAB
HMPV RNA SPEC QL NAA+PROBE: DETECTED
RAPID RVP RESULT: DETECTED
SARS-COV-2 RNA PNL RESP NAA+PROBE: NOT DETECTED

## 2023-04-02 LAB — BACTERIA THROAT CULT: NORMAL

## 2023-04-02 NOTE — PHYSICAL EXAM
[No Acute Distress] : no acute distress [Well Nourished] : well nourished [Well Developed] : well developed [Well-Appearing] : well-appearing [Normal Sclera/Conjunctiva] : normal sclera/conjunctiva [PERRL] : pupils equal round and reactive to light [EOMI] : extraocular movements intact [Normal Outer Ear/Nose] : the outer ears and nose were normal in appearance [Normal TMs] : both tympanic membranes were normal [No JVD] : no jugular venous distention [No Lymphadenopathy] : no lymphadenopathy [Supple] : supple [No Respiratory Distress] : no respiratory distress  [No Accessory Muscle Use] : no accessory muscle use [Clear to Auscultation] : lungs were clear to auscultation bilaterally [Normal Percussion] : the chest was normal to percussion [Normal Rate] : normal rate  [Regular Rhythm] : with a regular rhythm [Normal S1, S2] : normal S1 and S2 [No Murmur] : no murmur heard [No Carotid Bruits] : no carotid bruits [Pedal Pulses Present] : the pedal pulses are present [No Edema] : there was no peripheral edema [Soft] : abdomen soft [Non Tender] : non-tender [No HSM] : no HSM [Normal Axillary Nodes] : no axillary lymphadenopathy [Normal Posterior Cervical Nodes] : no posterior cervical lymphadenopathy [Normal Anterior Cervical Nodes] : no anterior cervical lymphadenopathy [Normal Inguinal Nodes] : no inguinal lymphadenopathy [No Rash] : no rash [de-identified] : 1+ posterior pharynx injection without exudate or petechiae, (+)mild bilateral turbinate congestion with clear discharge and postnasal drip

## 2023-04-02 NOTE — REVIEW OF SYSTEMS
[Fatigue] : fatigue [Nasal Discharge] : nasal discharge [Postnasal Drip] : postnasal drip [Cough] : cough [Negative] : Heme/Lymph [Fever] : no fever [Chills] : no chills [Earache] : no earache [Hoarseness] : no hoarseness [Sore Throat] : no sore throat [Shortness Of Breath] : no shortness of breath [Wheezing] : no wheezing [Dyspnea on Exertion] : no dyspnea on exertion [Skin Rash] : no skin rash [Swollen Glands] : no swollen glands

## 2023-04-02 NOTE — HISTORY OF PRESENT ILLNESS
[FreeTextEntry1] : Wet cough and sore throat x5 days [de-identified] : Patient presents with history x5 days of productive cough-clear to lightly yellow sputum-mild nasal congestion and sore throat. No fever or chills. No wheezing, pleuritic chest pain or dyspnea. Non-smoker. Has performed several home COVID-19 antigen tests, and a reported as negative. CXR was ordered and performed prior to office visit. Results are unavailable at this time.

## 2023-04-13 ENCOUNTER — RESULT REVIEW (OUTPATIENT)
Age: 38
End: 2023-04-13

## 2023-04-13 ENCOUNTER — TRANSCRIPTION ENCOUNTER (OUTPATIENT)
Age: 38
End: 2023-04-13

## 2023-04-13 ENCOUNTER — APPOINTMENT (OUTPATIENT)
Dept: BREAST CENTER | Facility: HOSPITAL | Age: 38
End: 2023-04-13

## 2023-04-24 ENCOUNTER — APPOINTMENT (OUTPATIENT)
Dept: BREAST CENTER | Facility: CLINIC | Age: 38
End: 2023-04-24
Payer: COMMERCIAL

## 2023-04-24 PROCEDURE — 99024 POSTOP FOLLOW-UP VISIT: CPT

## 2023-04-24 NOTE — ASSESSMENT
[FreeTextEntry1] : doing well\par path reviewed copy given\par plan mg/sono 3/2024\par discussed that no need for MRI at this time since area in question was excised\par f/u prn\par She knows to call or return sooner should any concerns or questions arise.\par

## 2023-04-24 NOTE — HISTORY OF PRESENT ILLNESS
[FreeTextEntry1] : This is a 37 year old female referred by Dr. Mcrae for surgical removal of left breast fibroadenoma.\par \par Patient is s/p left MRI-guided core needle biopsy for abnormality on breast imaging UIQ (hourglass clip) at [facility] on 3/14/23 which showed benign breast tissue with nodular foci of adenosis and fibroadenomatoid change. Results were benign and CONCORDANT. Dr. Mcrae had initially ordered imaging for palpable abnormality in the upper outer right breast, which was not definitely correlated with on the routine MG/US of 3/4/23 and thus additional imaging was obtained. She is undergoing IVF (2 rounds) and would like to have the lesion removed before she becomes pregnant. \par She works as a nurse at [employer].\par \par Patient is s/p left breast excisional biopsy [facility] on 4/13/2023. Pathology showed benign breast tissue with fibrocystic changes including sclerosing adenosis, fibroadenomatoid changes, columnar cell changes , UDH and microcysts with associated calcifications. \par \par She is doing well post op. She c/o bilateral leg itchiness and rash 7 days latera with petechiae. She took benadryl and steroid cream and this has resolved.\par \par She does SBE.\par She has not noticed a change in her breast or a breast lump.\par She has noticed a change in her nipple or nipple area, particularly the Ricardo glands.\par She has not noticed a change in the skin of the breast.\par She is not experiencing nipple discharge.\par She is not experiencing breast pain.\par She has not noticed a lump or lymph node under the armpit.\par \par \par BREAST CANCER RISK FACTORS\par Menarche: 15\par Date of LMP: 6/5/2022\par Menopause: Post age 36\par Grav: 1    Para: 0 \par Age at first live birth: ectopic pregnancy 6/2009\par Nursed: n/a\par Hysterectomy: N\par Oophorectomy: N\par OCP: Y 16 years\par HRT: IVF medications in 2020 and 2021\par Last pap/pelvic exam: 2022 HPV+\par Related family history: denies breast ovarian pancreatic cancer\par Ashkenazi: N\par Mastery risk assessment: BRCAPRO 25.8% TCv7 16.9% TCv8 16.0% Cecilia 12.5% Elvin 14.4%\par BRCA testing: [employer]ae VUS POLE (c.1282G>A)\par Bra size: 34B\par \par  \par \par Last mammogram:  3/4/23                  Location: [facility]\par Report reviewed.                               Images reviewed.\par Results: BIRADS 0\par Left breast: No mammographic evidence of malignancy\par Right breast: Vague increased density in the right central breast when compared to the left breast, nonspecific.\par Contrast enhanced MRI is recommended for further evaluation.\par \par Last ultrasound: 3/4/23                        Location: [facility]\par Report reviewed.                                 Images reviewed. \par Results: BIRADS 0\par Left breast: No sonographicc evidence of malignancy\par Right breast: Vague increased density in the right central breast when compared to the left breast, nonspecific.\par Contrast enhanced MRI is recommended for further evaluation.\par \par  Last MRI:  3/7/23  WITH & W/O CONTRAST        Location: [facility]\par Report reviewed.\par Results: BIRADS 3\par Subcentimeter T2 hyperintense mass in the left breast. This finding likely represents a benign fibroadenoma versus an intramammary lymph node. Short term MR follow up is recommended in 6 months to ensure stability of this finding on this baseline study.\par No MRI evidence of malignancy in the right breast\par

## 2023-05-22 ENCOUNTER — APPOINTMENT (OUTPATIENT)
Dept: BREAST CENTER | Facility: CLINIC | Age: 38
End: 2023-05-22
Payer: COMMERCIAL

## 2023-05-22 DIAGNOSIS — D24.2 BENIGN NEOPLASM OF LEFT BREAST: ICD-10-CM

## 2023-05-22 PROCEDURE — 99024 POSTOP FOLLOW-UP VISIT: CPT

## 2023-05-22 NOTE — ASSESSMENT
[FreeTextEntry1] : doing well\par plan mg/sono at 38 3/2024\par f/u as needed\par She knows to call or return sooner should any concerns or questions arise.\par

## 2023-05-22 NOTE — HISTORY OF PRESENT ILLNESS
[FreeTextEntry1] : This is a 37 year old female referred by Dr. Mcrae for surgical removal of left breast fibroadenoma.\par \par Patient is s/p left MRI-guided core needle biopsy for abnormality on breast imaging UIQ (hourglass clip) at [facility] on 3/14/23 which showed benign breast tissue with nodular foci of adenosis and fibroadenomatoid change. Results were benign and CONCORDANT. Dr. Mcrae had initially ordered imaging for palpable abnormality in the upper outer right breast, which was not definitely correlated with on the routine MG/US of 3/4/23 and thus additional imaging was obtained. She is undergoing IVF (2 rounds) and would like to have the lesion removed before she becomes pregnant. \par She works as a nurse at [employer].\par \par Patient is s/p left breast excisional biopsy [facility] on 4/13/2023. Pathology showed benign breast tissue with fibrocystic changes including sclerosing adenosis, fibroadenomatoid changes, columnar cell changes , UDH and microcysts with associated calcifications. \par \par She is presenting for glue removal.\par \par She does SBE.\par She has not noticed a change in her breast or a breast lump.\par She has noticed a change in her nipple or nipple area, particularly the Ricardo glands.\par She has not noticed a change in the skin of the breast.\par She is not experiencing nipple discharge.\par She is not experiencing breast pain.\par She has not noticed a lump or lymph node under the armpit.\par \par \par BREAST CANCER RISK FACTORS\par Menarche: 15\par Date of LMP: 6/5/2022\par Menopause: Post age 36\par Grav: 1    Para: 0 \par Age at first live birth: ectopic pregnancy 6/2009\par Nursed: n/a\par Hysterectomy: N\par Oophorectomy: N\par OCP: Y 16 years\par HRT: IVF medications in 2020 and 2021\par Last pap/pelvic exam: 2022 HPV+\par Related family history: denies breast ovarian pancreatic cancer\par Ashkenazi: N\par Mastery risk assessment: BRCAPRO 25.8% TCv7 16.9% TCv8 16.0% Cecilia 12.5% Elvin 14.4%\par BRCA testing: Inv[employer]ae VUS POLE (c.1282G>A)\par Bra size: 34B\par \par  \par \par Last mammogram:  3/4/23                  Location: [facility]\par Report reviewed.                               Images reviewed.\par Results: BIRADS 0\par Left breast: No mammographic evidence of malignancy\par Right breast: Vague increased density in the right central breast when compared to the left breast, nonspecific.\par Contrast enhanced MRI is recommended for further evaluation.\par \par Last ultrasound: 3/4/23                        Location: [facility]\par Report reviewed.                                 Images reviewed. \par Results: BIRADS 0\par Left breast: No sonographicc evidence of malignancy\par Right breast: Vague increased density in the right central breast when compared to the left breast, nonspecific.\par Contrast enhanced MRI is recommended for further evaluation.\par \par  Last MRI:  3/7/23  WITH & W/O CONTRAST        Location: [facility]\par Report reviewed.\par Results: BIRADS 3\par Subcentimeter T2 hyperintense mass in the left breast. This finding likely represents a benign fibroadenoma versus an intramammary lymph node. Short term MR follow up is recommended in 6 months to ensure stability of this finding on this baseline study.\par No MRI evidence of malignancy in the right breast\par

## 2023-06-02 ENCOUNTER — LABORATORY RESULT (OUTPATIENT)
Age: 38
End: 2023-06-02

## 2023-06-02 ENCOUNTER — NON-APPOINTMENT (OUTPATIENT)
Age: 38
End: 2023-06-02

## 2023-06-02 ENCOUNTER — APPOINTMENT (OUTPATIENT)
Dept: OBGYN | Facility: CLINIC | Age: 38
End: 2023-06-02
Payer: COMMERCIAL

## 2023-06-02 VITALS
BODY MASS INDEX: 21.21 KG/M2 | SYSTOLIC BLOOD PRESSURE: 110 MMHG | DIASTOLIC BLOOD PRESSURE: 70 MMHG | HEIGHT: 66 IN | WEIGHT: 132 LBS

## 2023-06-02 DIAGNOSIS — Z01.419 ENCOUNTER FOR GYNECOLOGICAL EXAMINATION (GENERAL) (ROUTINE) W/OUT ABNORMAL FINDINGS: ICD-10-CM

## 2023-06-02 DIAGNOSIS — L90.0 LICHEN SCLEROSUS ET ATROPHICUS: ICD-10-CM

## 2023-06-02 PROCEDURE — 99395 PREV VISIT EST AGE 18-39: CPT

## 2023-06-02 RX ORDER — CLOBETASOL PROPIONATE 0.5 MG/G
0.05 CREAM TOPICAL
Qty: 1 | Refills: 5 | Status: ACTIVE | COMMUNITY
Start: 2023-06-02 | End: 1900-01-01

## 2023-06-02 NOTE — HISTORY OF PRESENT ILLNESS
[TextBox_4] : 36yo  here for annual gyn. 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 \par 1)She has been amenorrheic on Kariva which she takes for her skin b/c she has acne otherwise. Wants to test her TFTs (was normal in January)\par 2) She has gone  to Dr. Claros for egg freezing; planning on getting pregnant soon on her own. H/o OHSS in 3/2021- estradiol level went to 7000 per pt. She did not need hospitalization but had ascites, abd pain, etc. Eventually lost weight and needed Zoloft and Zyprexa to gain it back and stabilize her moods. She is still on it now. 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 She has had an HSG and patent L fallopian tube but is nervous about getting pregnant when she tries in the near future.\par 5)Breast lumpectomy- benign\par She is in a relationship but may decide to have a baby independently; she is unsure if he is supportive. 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 \par

## 2023-06-02 NOTE — PLAN
[FreeTextEntry1] : Area of eczema/ lichen anterior to clitoris on mons.  Will try clobetasol; r/t if not improved.  \par to cont mgmt with JAROD; discussed AMA pregnancies\par

## 2023-06-08 LAB
C TRACH RRNA SPEC QL NAA+PROBE: NOT DETECTED
CORE LAB BIOPSY: NORMAL
CYTOLOGY CVX/VAG DOC THIN PREP: NORMAL
N GONORRHOEA RRNA SPEC QL NAA+PROBE: NOT DETECTED
SOURCE TP AMPLIFICATION: NORMAL
T3RU NFR SERPL: 1.2 TBI
T4 SERPL-MCNC: 13.2 UG/DL
TSH SERPL-ACNC: 1.31 UIU/ML

## 2023-06-09 LAB — HPV HIGH+LOW RISK DNA PNL CVX: DETECTED

## 2023-06-09 RX ORDER — FLUCONAZOLE 150 MG/1
150 TABLET ORAL
Qty: 2 | Refills: 3 | Status: ACTIVE | COMMUNITY
Start: 2023-06-09 | End: 1900-01-01

## 2023-06-20 DIAGNOSIS — Z86.19 PERSONAL HISTORY OF OTHER INFECTIOUS AND PARASITIC DISEASES: ICD-10-CM

## 2023-06-21 ENCOUNTER — APPOINTMENT (OUTPATIENT)
Dept: OBGYN | Facility: CLINIC | Age: 38
End: 2023-06-21
Payer: COMMERCIAL

## 2023-06-21 DIAGNOSIS — R87.619 UNSPECIFIED ABNORMAL CYTOLOGICAL FINDINGS IN SPECIMENS FROM CERVIX UTERI: ICD-10-CM

## 2023-06-21 PROCEDURE — 57454 BX/CURETT OF CERVIX W/SCOPE: CPT

## 2023-06-22 ENCOUNTER — APPOINTMENT (OUTPATIENT)
Dept: FAMILY MEDICINE | Facility: CLINIC | Age: 38
End: 2023-06-22
Payer: COMMERCIAL

## 2023-06-22 VITALS
SYSTOLIC BLOOD PRESSURE: 104 MMHG | HEIGHT: 66 IN | RESPIRATION RATE: 16 BRPM | WEIGHT: 136 LBS | OXYGEN SATURATION: 100 % | HEART RATE: 87 BPM | DIASTOLIC BLOOD PRESSURE: 70 MMHG | BODY MASS INDEX: 21.86 KG/M2 | TEMPERATURE: 98.6 F

## 2023-06-22 DIAGNOSIS — R94.6 ABNORMAL RESULTS OF THYROID FUNCTION STUDIES: ICD-10-CM

## 2023-06-22 LAB
ESTIMATED AVERAGE GLUCOSE: 111 MG/DL
HBA1C MFR BLD HPLC: 5.5 %
T3 SERPL-MCNC: 176 NG/DL
T4 FREE SERPL-MCNC: 1.3 NG/DL
TSH SERPL-ACNC: 1.27 UIU/ML

## 2023-06-22 PROCEDURE — 36415 COLL VENOUS BLD VENIPUNCTURE: CPT

## 2023-06-22 PROCEDURE — 99214 OFFICE O/P EST MOD 30 MIN: CPT | Mod: 25

## 2023-06-22 NOTE — PHYSICAL EXAM
[No Acute Distress] : no acute distress [Well Nourished] : well nourished [Well Developed] : well developed [Well-Appearing] : well-appearing [Normal Voice/Communication] : normal voice/communication [Normal Sclera/Conjunctiva] : normal sclera/conjunctiva [EOMI] : extraocular movements intact [Normal Outer Ear/Nose] : the outer ears and nose were normal in appearance [Normal TMs] : both tympanic membranes were normal [No JVD] : no jugular venous distention [Supple] : supple [No Respiratory Distress] : no respiratory distress  [Clear to Auscultation] : lungs were clear to auscultation bilaterally [Normal Rate] : normal rate  [Regular Rhythm] : with a regular rhythm [Normal S1, S2] : normal S1 and S2 [No Murmur] : no murmur heard [No Carotid Bruits] : no carotid bruits [Pedal Pulses Present] : the pedal pulses are present [No Edema] : there was no peripheral edema [Soft] : abdomen soft [Non Tender] : non-tender [No HSM] : no HSM [Normal Axillary Nodes] : no axillary lymphadenopathy [Normal Posterior Cervical Nodes] : no posterior cervical lymphadenopathy [Normal Anterior Cervical Nodes] : no anterior cervical lymphadenopathy [Normal Inguinal Nodes] : no inguinal lymphadenopathy [No CVA Tenderness] : no CVA  tenderness [No Joint Swelling] : no joint swelling [No Rash] : no rash [Coordination Grossly Intact] : coordination grossly intact [No Focal Deficits] : no focal deficits [Normal Gait] : normal gait

## 2023-06-22 NOTE — HISTORY OF PRESENT ILLNESS
[FreeTextEntry1] : Abnormal thyroid function tests [de-identified] : Patient presents for follow-up of recently noted abnormal thyroid function test performed by GYN. Patient is presently on OCPs and has no prior history of thyroid disease. No change in patient's symptoms, including-increase sweating, cold intolerance, palpitations, jitteriness, brittle nails/hair, change in weight or edema. Reports increased depressive and anxiety symptoms due to brother recently diagnosed with ALS. Requesting to restart escitalopram 5 mg/day. Also, patient requesting retesting of A1c.

## 2023-06-22 NOTE — REVIEW OF SYSTEMS
[Insomnia] : insomnia [Anxiety] : anxiety [Depression] : depression [Negative] : Heme/Lymph [Fatigue] : no fatigue [Recent Change In Weight] : ~T no recent weight change [Palpitations] : no palpitations [Lower Ext Edema] : no lower extremity edema [Nail Changes] : no nail changes [Hair Changes] : no hair changes [Skin Rash] : no skin rash [Suicidal] : not suicidal [Swollen Glands] : no swollen glands

## 2023-06-23 LAB
THYROGLOB AB SERPL-ACNC: <20 IU/ML
THYROPEROXIDASE AB SERPL IA-ACNC: <10 IU/ML

## 2023-06-30 ENCOUNTER — APPOINTMENT (OUTPATIENT)
Dept: FAMILY MEDICINE | Facility: CLINIC | Age: 38
End: 2023-06-30
Payer: COMMERCIAL

## 2023-06-30 VITALS
SYSTOLIC BLOOD PRESSURE: 100 MMHG | TEMPERATURE: 98.4 F | BODY MASS INDEX: 21.86 KG/M2 | HEART RATE: 99 BPM | OXYGEN SATURATION: 99 % | HEIGHT: 66 IN | DIASTOLIC BLOOD PRESSURE: 60 MMHG | WEIGHT: 136 LBS | RESPIRATION RATE: 16 BRPM

## 2023-06-30 DIAGNOSIS — R30.0 DYSURIA: ICD-10-CM

## 2023-06-30 LAB
BILIRUB UR QL STRIP: NEGATIVE
CLARITY UR: CLEAR
COLLECTION METHOD: NORMAL
GLUCOSE UR-MCNC: NEGATIVE
HCG UR QL: 0.2 EU/DL
HGB UR QL STRIP.AUTO: NEGATIVE
KETONES UR-MCNC: NEGATIVE
LEUKOCYTE ESTERASE UR QL STRIP: NORMAL
NITRITE UR QL STRIP: NEGATIVE
PH UR STRIP: 6.5
PROT UR STRIP-MCNC: NEGATIVE
SP GR UR STRIP: <=1.005

## 2023-06-30 PROCEDURE — 81003 URINALYSIS AUTO W/O SCOPE: CPT | Mod: QW

## 2023-06-30 PROCEDURE — 99214 OFFICE O/P EST MOD 30 MIN: CPT | Mod: 25

## 2023-06-30 RX ORDER — TRAZODONE HYDROCHLORIDE 50 MG/1
50 TABLET ORAL
Qty: 30 | Refills: 0 | Status: ACTIVE | COMMUNITY
Start: 2023-06-30 | End: 1900-01-01

## 2023-07-02 PROBLEM — R30.0 DYSURIA: Status: ACTIVE | Noted: 2020-01-06

## 2023-07-02 LAB
APPEARANCE: CLEAR
BACTERIA UR CULT: NORMAL
BACTERIA: NEGATIVE /HPF
BILIRUBIN URINE: NEGATIVE
BLOOD URINE: NEGATIVE
CAST: 0 /LPF
COLOR: YELLOW
EPITHELIAL CELLS: 1 /HPF
GLUCOSE QUALITATIVE U: NEGATIVE MG/DL
KETONES URINE: NEGATIVE MG/DL
LEUKOCYTE ESTERASE URINE: ABNORMAL
MICROSCOPIC-UA: NORMAL
NITRITE URINE: NEGATIVE
PH URINE: 6.5
PROTEIN URINE: NEGATIVE MG/DL
RED BLOOD CELLS URINE: 1 /HPF
SPECIFIC GRAVITY URINE: 1.01
UROBILINOGEN URINE: 0.2 MG/DL
WHITE BLOOD CELLS URINE: 0 /HPF

## 2023-07-02 NOTE — REVIEW OF SYSTEMS
[Insomnia] : insomnia [Anxiety] : anxiety [Depression] : depression [Negative] : Heme/Lymph [Dysuria] : dysuria [Frequency] : frequency [Fatigue] : no fatigue [Recent Change In Weight] : ~T no recent weight change [Palpitations] : no palpitations [Lower Ext Edema] : no lower extremity edema [Incontinence] : no incontinence [Nocturia] : no nocturia [Hematuria] : no hematuria [Vaginal Discharge] : no vaginal discharge [Nail Changes] : no nail changes [Hair Changes] : no hair changes [Skin Rash] : no skin rash [Suicidal] : not suicidal [Swollen Glands] : no swollen glands

## 2023-07-20 ENCOUNTER — NON-APPOINTMENT (OUTPATIENT)
Age: 38
End: 2023-07-20

## 2023-07-24 ENCOUNTER — APPOINTMENT (OUTPATIENT)
Dept: FAMILY MEDICINE | Facility: CLINIC | Age: 38
End: 2023-07-24
Payer: COMMERCIAL

## 2023-07-24 VITALS
DIASTOLIC BLOOD PRESSURE: 64 MMHG | TEMPERATURE: 98 F | HEART RATE: 88 BPM | RESPIRATION RATE: 16 BRPM | BODY MASS INDEX: 21.86 KG/M2 | OXYGEN SATURATION: 100 % | WEIGHT: 136 LBS | SYSTOLIC BLOOD PRESSURE: 106 MMHG | HEIGHT: 66 IN

## 2023-07-24 DIAGNOSIS — F51.04 PSYCHOPHYSIOLOGIC INSOMNIA: ICD-10-CM

## 2023-07-24 DIAGNOSIS — K12.1 OTHER FORMS OF STOMATITIS: ICD-10-CM

## 2023-07-24 DIAGNOSIS — K20.90 ESOPHAGITIS, UNSPECIFIED WITHOUT BLEEDING: ICD-10-CM

## 2023-07-24 PROCEDURE — 99214 OFFICE O/P EST MOD 30 MIN: CPT

## 2023-07-24 RX ORDER — LIDOCAINE HYDROCHLORIDE 20 MG/ML
2 SOLUTION ORAL; TOPICAL
Qty: 1 | Refills: 1 | Status: ACTIVE | COMMUNITY
Start: 2023-07-24 | End: 1900-01-01

## 2023-07-24 RX ORDER — DIPHENHYDRAMINE HYDROCHLORIDE 25 MG/10ML
12.5 SOLUTION ORAL EVERY 6 HOURS
Qty: 120 | Refills: 0 | Status: ACTIVE | COMMUNITY
Start: 2023-07-24 | End: 1900-01-01

## 2023-07-24 NOTE — REVIEW OF SYSTEMS
[Dysuria] : dysuria [Frequency] : frequency [Insomnia] : insomnia [Anxiety] : anxiety [Depression] : depression [Negative] : Heme/Lymph [Sore Throat] : sore throat [Heartburn] : heartburn [Fatigue] : no fatigue [Recent Change In Weight] : ~T no recent weight change [Palpitations] : no palpitations [Lower Ext Edema] : no lower extremity edema [Abdominal Pain] : no abdominal pain [Incontinence] : no incontinence [Nocturia] : no nocturia [Hematuria] : no hematuria [Vaginal Discharge] : no vaginal discharge [Nail Changes] : no nail changes [Hair Changes] : no hair changes [Skin Rash] : no skin rash [Suicidal] : not suicidal [Swollen Glands] : no swollen glands

## 2023-07-24 NOTE — PHYSICAL EXAM
[No Acute Distress] : no acute distress [Well Nourished] : well nourished [Well Developed] : well developed [Well-Appearing] : well-appearing [Normal Voice/Communication] : normal voice/communication [Normal Sclera/Conjunctiva] : normal sclera/conjunctiva [EOMI] : extraocular movements intact [Normal Outer Ear/Nose] : the outer ears and nose were normal in appearance [Normal TMs] : both tympanic membranes were normal [No JVD] : no jugular venous distention [Supple] : supple [No Respiratory Distress] : no respiratory distress  [Clear to Auscultation] : lungs were clear to auscultation bilaterally [Normal Rate] : normal rate  [Regular Rhythm] : with a regular rhythm [Normal S1, S2] : normal S1 and S2 [No Murmur] : no murmur heard [No Carotid Bruits] : no carotid bruits [Pedal Pulses Present] : the pedal pulses are present [No Edema] : there was no peripheral edema [Soft] : abdomen soft [Non Tender] : non-tender [No HSM] : no HSM [Normal Axillary Nodes] : no axillary lymphadenopathy [Normal Posterior Cervical Nodes] : no posterior cervical lymphadenopathy [Normal Anterior Cervical Nodes] : no anterior cervical lymphadenopathy [Normal Inguinal Nodes] : no inguinal lymphadenopathy [No CVA Tenderness] : no CVA  tenderness [No Joint Swelling] : no joint swelling [No Rash] : no rash [de-identified] : 1+ posterior pharynx and oral cavity erythema without fasciculations
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
## 2023-07-27 ENCOUNTER — RX RENEWAL (OUTPATIENT)
Age: 38
End: 2023-07-27

## 2023-08-14 ENCOUNTER — RX RENEWAL (OUTPATIENT)
Age: 38
End: 2023-08-14

## 2023-08-14 ENCOUNTER — TRANSCRIPTION ENCOUNTER (OUTPATIENT)
Age: 38
End: 2023-08-14

## 2023-08-15 ENCOUNTER — APPOINTMENT (OUTPATIENT)
Dept: OBGYN | Facility: CLINIC | Age: 38
End: 2023-08-15
Payer: COMMERCIAL

## 2023-08-15 ENCOUNTER — ASOB RESULT (OUTPATIENT)
Age: 38
End: 2023-08-15

## 2023-08-15 PROCEDURE — 76830 TRANSVAGINAL US NON-OB: CPT

## 2023-08-16 ENCOUNTER — APPOINTMENT (OUTPATIENT)
Dept: OBGYN | Facility: CLINIC | Age: 38
End: 2023-08-16
Payer: COMMERCIAL

## 2023-08-16 DIAGNOSIS — N88.8 OTHER SPECIFIED NONINFLAMMATORY DISORDERS OF CERVIX UTERI: ICD-10-CM

## 2023-08-16 PROCEDURE — 99213 OFFICE O/P EST LOW 20 MIN: CPT

## 2023-08-16 NOTE — PHYSICAL EXAM
[Labia Majora] : normal [Labia Minora] : normal [Normal] : normal [Uterine Adnexae] : normal [FreeTextEntry5] : Os finder used to probe cervix- easily passes instrument 4cm

## 2023-08-16 NOTE — HISTORY OF PRESENT ILLNESS
[TextBox_4] : See previous annual note for detailed isues. Here today to evaluate for "prominent cervix" on CT abd/ pelvis done at [facility].   Pelvic sono in our office showed some fluid in cervical cavity so here to excluded stenosis after recent colposcopy for pap that was benign, HPV+ (previous ASCUS, HPV+). May want to do cryo for repeat HPV+.

## 2023-08-16 NOTE — PLAN
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

## 2023-08-20 ENCOUNTER — NON-APPOINTMENT (OUTPATIENT)
Age: 38
End: 2023-08-20

## 2023-08-23 ENCOUNTER — APPOINTMENT (OUTPATIENT)
Dept: FAMILY MEDICINE | Facility: CLINIC | Age: 38
End: 2023-08-23

## 2023-08-26 ENCOUNTER — NON-APPOINTMENT (OUTPATIENT)
Age: 38
End: 2023-08-26

## 2023-08-26 DIAGNOSIS — R06.2 WHEEZING: ICD-10-CM

## 2023-08-26 DIAGNOSIS — U07.1 COVID-19: ICD-10-CM

## 2023-08-26 RX ORDER — NIRMATRELVIR AND RITONAVIR 300-100 MG
20 X 150 MG & KIT ORAL
Qty: 1 | Refills: 0 | Status: ACTIVE | COMMUNITY
Start: 2023-08-26 | End: 1900-01-01

## 2023-08-26 RX ORDER — ALBUTEROL SULFATE 90 UG/1
108 (90 BASE) INHALANT RESPIRATORY (INHALATION)
Qty: 1 | Refills: 0 | Status: ACTIVE | COMMUNITY
Start: 2023-08-26 | End: 1900-01-01

## 2023-08-28 ENCOUNTER — APPOINTMENT (OUTPATIENT)
Dept: HUMAN REPRODUCTION | Facility: CLINIC | Age: 38
End: 2023-08-28
Payer: COMMERCIAL

## 2023-08-28 PROCEDURE — 99214 OFFICE O/P EST MOD 30 MIN: CPT | Mod: 95

## 2023-09-05 ENCOUNTER — APPOINTMENT (OUTPATIENT)
Dept: HUMAN REPRODUCTION | Facility: CLINIC | Age: 38
End: 2023-09-05
Payer: COMMERCIAL

## 2023-09-05 PROCEDURE — 36415 COLL VENOUS BLD VENIPUNCTURE: CPT

## 2023-09-05 PROCEDURE — 99213 OFFICE O/P EST LOW 20 MIN: CPT | Mod: 25

## 2023-09-05 PROCEDURE — 76831 ECHO EXAM UTERUS: CPT

## 2023-09-05 PROCEDURE — 58340 CATHETER FOR HYSTEROGRAPHY: CPT

## 2023-09-05 PROCEDURE — 58999I: CUSTOM

## 2023-09-05 PROCEDURE — 84702 CHORIONIC GONADOTROPIN TEST: CPT

## 2023-09-06 ENCOUNTER — RESULT REVIEW (OUTPATIENT)
Age: 38
End: 2023-09-06

## 2023-09-06 ENCOUNTER — APPOINTMENT (OUTPATIENT)
Dept: HUMAN REPRODUCTION | Facility: CLINIC | Age: 38
End: 2023-09-06

## 2023-09-06 ENCOUNTER — APPOINTMENT (OUTPATIENT)
Dept: FAMILY MEDICINE | Facility: CLINIC | Age: 38
End: 2023-09-06
Payer: COMMERCIAL

## 2023-09-06 VITALS
WEIGHT: 135 LBS | OXYGEN SATURATION: 99 % | BODY MASS INDEX: 21.69 KG/M2 | HEIGHT: 66 IN | DIASTOLIC BLOOD PRESSURE: 66 MMHG | HEART RATE: 95 BPM | RESPIRATION RATE: 16 BRPM | TEMPERATURE: 98.2 F | SYSTOLIC BLOOD PRESSURE: 112 MMHG

## 2023-09-06 DIAGNOSIS — R05.3 CHRONIC COUGH: ICD-10-CM

## 2023-09-06 DIAGNOSIS — R10.9 UNSPECIFIED ABDOMINAL PAIN: ICD-10-CM

## 2023-09-06 DIAGNOSIS — U09.9 CHRONIC COUGH: ICD-10-CM

## 2023-09-06 PROCEDURE — 99214 OFFICE O/P EST MOD 30 MIN: CPT

## 2023-09-06 NOTE — HISTORY OF PRESENT ILLNESS
[de-identified] : Tested positive for COVID-19 infection on 08/24/2023-persistent cough with loss of smell/taste. Needs off work order/back to work order-08/24-09/10/2023 and 09/11/2023 CXR due to persistent cough Pelvic ultrasound due to persistent right lower flank pain Flovent 44 mcg 2 breaths inhaled twice daily Albuterol HFA-2 puffs inhaled every 6 hours as needed

## 2023-09-07 ENCOUNTER — APPOINTMENT (OUTPATIENT)
Dept: OBGYN | Facility: CLINIC | Age: 38
End: 2023-09-07
Payer: COMMERCIAL

## 2023-09-07 VITALS
SYSTOLIC BLOOD PRESSURE: 112 MMHG | DIASTOLIC BLOOD PRESSURE: 70 MMHG | WEIGHT: 135 LBS | HEIGHT: 66 IN | BODY MASS INDEX: 21.69 KG/M2

## 2023-09-07 DIAGNOSIS — B97.7 PAPILLOMAVIRUS AS THE CAUSE OF DISEASES CLASSIFIED ELSEWHERE: ICD-10-CM

## 2023-09-07 PROCEDURE — 57511 CRYOCAUTERY OF CERVIX: CPT

## 2023-09-07 RX ORDER — ALBUTEROL SULFATE 90 UG/1
108 (90 BASE) INHALANT RESPIRATORY (INHALATION)
Qty: 1 | Refills: 0 | Status: ACTIVE | COMMUNITY
Start: 2023-09-06 | End: 1900-01-01

## 2023-09-07 RX ORDER — FLUTICASONE PROPIONATE 44 UG/1
44 AEROSOL, METERED RESPIRATORY (INHALATION)
Qty: 1 | Refills: 1 | Status: ACTIVE | COMMUNITY
Start: 2023-09-06 | End: 1900-01-01

## 2023-09-07 RX ORDER — GUAIFENESIN AND CODEINE PHOSPHATE 10; 100 MG/5ML; MG/5ML
100-10 SOLUTION ORAL
Qty: 120 | Refills: 1 | Status: ACTIVE | COMMUNITY
Start: 2023-09-06 | End: 1900-01-01

## 2023-09-08 PROBLEM — B97.7 HPV (HUMAN PAPILLOMA VIRUS) INFECTION: Status: ACTIVE | Noted: 2023-06-21

## 2023-09-10 ENCOUNTER — APPOINTMENT (OUTPATIENT)
Dept: FAMILY MEDICINE | Facility: CLINIC | Age: 38
End: 2023-09-10
Payer: COMMERCIAL

## 2023-09-10 VITALS
DIASTOLIC BLOOD PRESSURE: 60 MMHG | BODY MASS INDEX: 21.69 KG/M2 | SYSTOLIC BLOOD PRESSURE: 102 MMHG | HEIGHT: 66 IN | RESPIRATION RATE: 16 BRPM | TEMPERATURE: 98.8 F | HEART RATE: 83 BPM | OXYGEN SATURATION: 100 % | WEIGHT: 135 LBS

## 2023-09-10 PROCEDURE — 99213 OFFICE O/P EST LOW 20 MIN: CPT

## 2023-09-11 RX ORDER — ONDANSETRON 4 MG/1
4 TABLET, ORALLY DISINTEGRATING ORAL
Qty: 30 | Refills: 1 | Status: ACTIVE | COMMUNITY
Start: 2019-09-09 | End: 1900-01-01

## 2023-09-27 ENCOUNTER — RX RENEWAL (OUTPATIENT)
Age: 38
End: 2023-09-27

## 2023-09-28 PROBLEM — R05.3 POST-COVID CHRONIC COUGH: Status: ACTIVE | Noted: 2023-09-06

## 2023-09-28 PROBLEM — R10.9 RIGHT FLANK PAIN: Status: ACTIVE | Noted: 2023-09-06

## 2023-11-15 ENCOUNTER — APPOINTMENT (OUTPATIENT)
Dept: FAMILY MEDICINE | Facility: CLINIC | Age: 38
End: 2023-11-15
Payer: COMMERCIAL

## 2023-11-15 VITALS
TEMPERATURE: 98.3 F | HEIGHT: 66 IN | BODY MASS INDEX: 22.5 KG/M2 | DIASTOLIC BLOOD PRESSURE: 60 MMHG | OXYGEN SATURATION: 100 % | HEART RATE: 83 BPM | RESPIRATION RATE: 16 BRPM | SYSTOLIC BLOOD PRESSURE: 100 MMHG | WEIGHT: 140 LBS

## 2023-11-15 DIAGNOSIS — Z31.41 ENCOUNTER FOR FERTILITY TESTING: ICD-10-CM

## 2023-11-15 PROCEDURE — 99214 OFFICE O/P EST MOD 30 MIN: CPT | Mod: 25

## 2023-11-15 PROCEDURE — 36415 COLL VENOUS BLD VENIPUNCTURE: CPT

## 2023-11-15 RX ORDER — SUMATRIPTAN 100 MG/1
100 TABLET, FILM COATED ORAL
Qty: 54 | Refills: 1 | Status: ACTIVE | COMMUNITY
Start: 2019-09-09 | End: 1900-01-01

## 2023-11-17 LAB
25(OH)D3 SERPL-MCNC: 56.5 NG/ML
ABO + RH PNL BLD: NORMAL
ALBUMIN SERPL ELPH-MCNC: 4.5 G/DL
ALP BLD-CCNC: 92 U/L
ALT SERPL-CCNC: 24 U/L
ANION GAP SERPL CALC-SCNC: 13 MMOL/L
AST SERPL-CCNC: 22 U/L
BASOPHILS # BLD AUTO: 0.04 K/UL
BASOPHILS NFR BLD AUTO: 0.6 %
BILIRUB SERPL-MCNC: 0.6 MG/DL
BUN SERPL-MCNC: 9 MG/DL
CALCIUM SERPL-MCNC: 9.6 MG/DL
CHLORIDE SERPL-SCNC: 102 MMOL/L
CHOLEST SERPL-MCNC: 200 MG/DL
CO2 SERPL-SCNC: 24 MMOL/L
CREAT SERPL-MCNC: 0.63 MG/DL
EGFR: 115 ML/MIN/1.73M2
EOSINOPHIL # BLD AUTO: 0.23 K/UL
EOSINOPHIL NFR BLD AUTO: 3.4 %
ESTIMATED AVERAGE GLUCOSE: 105 MG/DL
GLUCOSE SERPL-MCNC: 125 MG/DL
HBA1C MFR BLD HPLC: 5.3 %
HBV SURFACE AG SER QL: NONREACTIVE
HCT VFR BLD CALC: 44.8 %
HCV AB SER QL: NONREACTIVE
HCV S/CO RATIO: 0.07 S/CO
HDLC SERPL-MCNC: 40 MG/DL
HGB BLD-MCNC: 14.3 G/DL
HIV1+2 AB SPEC QL IA.RAPID: NONREACTIVE
IMM GRANULOCYTES NFR BLD AUTO: 0.1 %
LDLC SERPL CALC-MCNC: 115 MG/DL
LYMPHOCYTES # BLD AUTO: 2.32 K/UL
LYMPHOCYTES NFR BLD AUTO: 34.6 %
MAN DIFF?: NORMAL
MCHC RBC-ENTMCNC: 31.9 GM/DL
MCHC RBC-ENTMCNC: 32.3 PG
MCV RBC AUTO: 101.1 FL
MEV IGG FLD QL IA: 99.1 AU/ML
MEV IGG+IGM SER-IMP: POSITIVE
MONOCYTES # BLD AUTO: 0.55 K/UL
MONOCYTES NFR BLD AUTO: 8.2 %
MUV AB SER-ACNC: POSITIVE
MUV IGG SER QL IA: 76.4 AU/ML
NEUTROPHILS # BLD AUTO: 3.55 K/UL
NEUTROPHILS NFR BLD AUTO: 53.1 %
NONHDLC SERPL-MCNC: 160 MG/DL
PLATELET # BLD AUTO: 265 K/UL
POTASSIUM SERPL-SCNC: 3.9 MMOL/L
PROLACTIN SERPL-MCNC: 6.1 NG/ML
PROT SERPL-MCNC: 7.1 G/DL
RBC # BLD: 4.43 M/UL
RBC # FLD: 12.1 %
RUBV IGG FLD-ACNC: 3.6 INDEX
RUBV IGG SER-IMP: POSITIVE
SODIUM SERPL-SCNC: 139 MMOL/L
T PALLIDUM AB SER QL IA: NEGATIVE
TRIGL SERPL-MCNC: 254 MG/DL
TSH SERPL-ACNC: 0.94 UIU/ML
VZV AB TITR SER: POSITIVE
VZV IGG SER IF-ACNC: 1142 INDEX
WBC # FLD AUTO: 6.7 K/UL

## 2023-11-26 PROBLEM — Z31.41 FERTILITY TESTING: Status: ACTIVE | Noted: 2023-11-15

## 2023-12-21 ENCOUNTER — APPOINTMENT (OUTPATIENT)
Dept: FAMILY MEDICINE | Facility: CLINIC | Age: 38
End: 2023-12-21
Payer: COMMERCIAL

## 2023-12-21 ENCOUNTER — RESULT REVIEW (OUTPATIENT)
Age: 38
End: 2023-12-21

## 2023-12-21 VITALS
HEIGHT: 66 IN | TEMPERATURE: 98.5 F | WEIGHT: 141 LBS | SYSTOLIC BLOOD PRESSURE: 108 MMHG | RESPIRATION RATE: 16 BRPM | DIASTOLIC BLOOD PRESSURE: 62 MMHG | HEART RATE: 107 BPM | OXYGEN SATURATION: 98 % | BODY MASS INDEX: 22.66 KG/M2

## 2023-12-21 DIAGNOSIS — B34.9 VIRAL INFECTION, UNSPECIFIED: ICD-10-CM

## 2023-12-21 DIAGNOSIS — J02.9 ACUTE PHARYNGITIS, UNSPECIFIED: ICD-10-CM

## 2023-12-21 DIAGNOSIS — J20.8 ACUTE BRONCHITIS DUE TO OTHER SPECIFIED ORGANISMS: ICD-10-CM

## 2023-12-21 LAB
RAPID RVP RESULT: DETECTED
RV+EV RNA SPEC QL NAA+PROBE: DETECTED
SARS-COV-2 RNA PNL RESP NAA+PROBE: NOT DETECTED

## 2023-12-21 PROCEDURE — 99213 OFFICE O/P EST LOW 20 MIN: CPT

## 2023-12-21 RX ORDER — BENZONATATE 200 MG/1
200 CAPSULE ORAL 3 TIMES DAILY
Qty: 21 | Refills: 1 | Status: ACTIVE | COMMUNITY
Start: 2023-12-21 | End: 1900-01-01

## 2023-12-23 LAB — BACTERIA THROAT CULT: NORMAL

## 2023-12-24 PROBLEM — B34.9 ACUTE VIRAL SYNDROME: Status: ACTIVE | Noted: 2023-12-21

## 2023-12-24 PROBLEM — J02.9 ACUTE PHARYNGITIS, UNSPECIFIED ETIOLOGY: Status: ACTIVE | Noted: 2023-03-31

## 2023-12-24 PROBLEM — J20.8 ACUTE VIRAL BRONCHITIS: Status: ACTIVE | Noted: 2020-02-03

## 2023-12-24 NOTE — HISTORY OF PRESENT ILLNESS
[FreeTextEntry1] : Sore throat, nasal congestion, cough x 3-4 days [de-identified] : Patient presents with history of sore throat, nasal congestion and nonproductive cough for the last 3-4 days. No fever, chills, wheezing, hemoptysis, pleuritic chest pain or exertional dyspnea. Non-smoker. Has been out of work for the last several days and wishes to return within the week.

## 2023-12-24 NOTE — PLAN
[FreeTextEntry1] : 1)-Treatment plan as outlined above. 2)-No other prescription refills needed at this time.

## 2023-12-24 NOTE — REVIEW OF SYSTEMS
[Fatigue] : fatigue [Nasal Discharge] : nasal discharge [Sore Throat] : sore throat [Postnasal Drip] : postnasal drip [Cough] : cough [Negative] : Heme/Lymph [Fever] : no fever [Chills] : no chills [Discharge] : no discharge [Redness] : no redness [Earache] : no earache [Hearing Loss] : no hearing loss [Chest Pain] : no chest pain [Hoarseness] : no hoarseness [Shortness Of Breath] : no shortness of breath [Wheezing] : no wheezing [Dyspnea on Exertion] : no dyspnea on exertion [Abdominal Pain] : no abdominal pain [Hematuria] : no hematuria [Skin Rash] : no skin rash [Swollen Glands] : no swollen glands

## 2023-12-24 NOTE — PHYSICAL EXAM
[No Acute Distress] : no acute distress [Well Nourished] : well nourished [Well Developed] : well developed [Well-Appearing] : well-appearing [Normal Voice/Communication] : normal voice/communication [Normal Sclera/Conjunctiva] : normal sclera/conjunctiva [PERRL] : pupils equal round and reactive to light [EOMI] : extraocular movements intact [Normal Outer Ear/Nose] : the outer ears and nose were normal in appearance [No JVD] : no jugular venous distention [No Lymphadenopathy] : no lymphadenopathy [No Respiratory Distress] : no respiratory distress  [Supple] : supple [No Accessory Muscle Use] : no accessory muscle use [Clear to Auscultation] : lungs were clear to auscultation bilaterally [Normal Percussion] : the chest was normal to percussion [Normal Rate] : normal rate  [Regular Rhythm] : with a regular rhythm [Normal S1, S2] : normal S1 and S2 [No Murmur] : no murmur heard [No Carotid Bruits] : no carotid bruits [Pedal Pulses Present] : the pedal pulses are present [No Edema] : there was no peripheral edema [Soft] : abdomen soft [Non Tender] : non-tender [Normal Axillary Nodes] : no axillary lymphadenopathy [No HSM] : no HSM [Normal Posterior Cervical Nodes] : no posterior cervical lymphadenopathy [Normal Anterior Cervical Nodes] : no anterior cervical lymphadenopathy [Normal Inguinal Nodes] : no inguinal lymphadenopathy [No Rash] : no rash [de-identified] : (+)bilateral turbinate congestion/inflammation with clear discharge and postnasal drip, posterior pharynx-1+ injected without exudate or petechiae

## 2023-12-30 ENCOUNTER — NON-APPOINTMENT (OUTPATIENT)
Age: 38
End: 2023-12-30

## 2024-01-22 ENCOUNTER — APPOINTMENT (OUTPATIENT)
Dept: ENDOCRINOLOGY | Facility: CLINIC | Age: 39
End: 2024-01-22

## 2024-01-25 ENCOUNTER — NON-APPOINTMENT (OUTPATIENT)
Age: 39
End: 2024-01-25

## 2024-01-26 ENCOUNTER — NON-APPOINTMENT (OUTPATIENT)
Age: 39
End: 2024-01-26

## 2024-01-27 DIAGNOSIS — J32.0 CHRONIC MAXILLARY SINUSITIS: ICD-10-CM

## 2024-01-27 RX ORDER — DOXYCYCLINE 100 MG/1
100 CAPSULE ORAL
Qty: 20 | Refills: 0 | Status: ACTIVE | COMMUNITY
Start: 2024-01-27 | End: 1900-01-01

## 2024-01-29 ENCOUNTER — NON-APPOINTMENT (OUTPATIENT)
Age: 39
End: 2024-01-29

## 2024-01-29 ENCOUNTER — APPOINTMENT (OUTPATIENT)
Dept: FAMILY MEDICINE | Facility: CLINIC | Age: 39
End: 2024-01-29
Payer: COMMERCIAL

## 2024-01-29 ENCOUNTER — LABORATORY RESULT (OUTPATIENT)
Age: 39
End: 2024-01-29

## 2024-01-29 VITALS
WEIGHT: 141 LBS | BODY MASS INDEX: 22.66 KG/M2 | HEART RATE: 79 BPM | SYSTOLIC BLOOD PRESSURE: 110 MMHG | DIASTOLIC BLOOD PRESSURE: 70 MMHG | HEIGHT: 66 IN | OXYGEN SATURATION: 100 % | RESPIRATION RATE: 16 BRPM | TEMPERATURE: 97.7 F

## 2024-01-29 DIAGNOSIS — Z12.11 ENCOUNTER FOR SCREENING FOR MALIGNANT NEOPLASM OF COLON: ICD-10-CM

## 2024-01-29 DIAGNOSIS — R39.15 URGENCY OF URINATION: ICD-10-CM

## 2024-01-29 DIAGNOSIS — G43.009 MIGRAINE W/OUT AURA, NOT INTRACTABLE, W/OUT STATUS MIGRAINOSUS: ICD-10-CM

## 2024-01-29 DIAGNOSIS — Z12.12 ENCOUNTER FOR SCREENING FOR MALIGNANT NEOPLASM OF COLON: ICD-10-CM

## 2024-01-29 DIAGNOSIS — Z12.31 ENCOUNTER FOR SCREENING MAMMOGRAM FOR MALIGNANT NEOPLASM OF BREAST: ICD-10-CM

## 2024-01-29 DIAGNOSIS — Z00.00 ENCOUNTER FOR GENERAL ADULT MEDICAL EXAMINATION W/OUT ABNORMAL FINDINGS: ICD-10-CM

## 2024-01-29 DIAGNOSIS — F32.1 MAJOR DEPRESSIVE DISORDER, SINGLE EPISODE, MODERATE: ICD-10-CM

## 2024-01-29 DIAGNOSIS — U07.1 COVID-19: ICD-10-CM

## 2024-01-29 DIAGNOSIS — K51.90 ULCERATIVE COLITIS, UNSPECIFIED, W/OUT COMPLICATIONS: ICD-10-CM

## 2024-01-29 PROCEDURE — 99214 OFFICE O/P EST MOD 30 MIN: CPT | Mod: 25

## 2024-01-29 PROCEDURE — 92552 PURE TONE AUDIOMETRY AIR: CPT

## 2024-01-29 PROCEDURE — G0444 DEPRESSION SCREEN ANNUAL: CPT | Mod: 59

## 2024-01-29 PROCEDURE — 99396 PREV VISIT EST AGE 40-64: CPT | Mod: 25

## 2024-01-29 PROCEDURE — 93000 ELECTROCARDIOGRAM COMPLETE: CPT | Mod: 59

## 2024-01-29 PROCEDURE — 99173 VISUAL ACUITY SCREEN: CPT | Mod: 59

## 2024-01-29 PROCEDURE — 36415 COLL VENOUS BLD VENIPUNCTURE: CPT

## 2024-01-30 PROBLEM — Z00.00 ADULT GENERAL MEDICAL EXAMINATION: Status: ACTIVE | Noted: 2020-01-02

## 2024-01-30 PROBLEM — U07.1 COVID-19 VIRUS INFECTION: Status: ACTIVE | Noted: 2023-09-06

## 2024-01-30 PROBLEM — G43.009 MIGRAINE HEADACHE WITHOUT AURA: Status: ACTIVE | Noted: 2018-11-28

## 2024-01-30 PROBLEM — R39.15 URINARY URGENCY: Status: ACTIVE | Noted: 2023-01-26

## 2024-01-30 PROBLEM — Z12.11 SCREENING FOR COLORECTAL CANCER: Status: ACTIVE | Noted: 2020-01-02

## 2024-01-30 PROBLEM — K51.90 ULCERATIVE COLITIS: Status: ACTIVE | Noted: 2018-11-28

## 2024-01-30 PROBLEM — F32.1 CURRENT MODERATE EPISODE OF MAJOR DEPRESSIVE DISORDER, UNSPECIFIED WHETHER RECURRENT: Status: ACTIVE | Noted: 2018-12-14

## 2024-01-30 LAB
25(OH)D3 SERPL-MCNC: 56.6 NG/ML
ALBUMIN SERPL ELPH-MCNC: 4.6 G/DL
ALP BLD-CCNC: 88 U/L
ALT SERPL-CCNC: 59 U/L
ANION GAP SERPL CALC-SCNC: 12 MMOL/L
APPEARANCE: CLEAR
AST SERPL-CCNC: 38 U/L
BASOPHILS # BLD AUTO: 0.02 K/UL
BASOPHILS NFR BLD AUTO: 0.2 %
BILIRUB SERPL-MCNC: 0.3 MG/DL
BILIRUBIN URINE: NEGATIVE
BLOOD URINE: NEGATIVE
BUN SERPL-MCNC: 13 MG/DL
CALCIUM SERPL-MCNC: 9.7 MG/DL
CHLORIDE SERPL-SCNC: 101 MMOL/L
CHOLEST SERPL-MCNC: 201 MG/DL
CO2 SERPL-SCNC: 24 MMOL/L
COLOR: NORMAL
CREAT SERPL-MCNC: 0.8 MG/DL
EGFR: 95 ML/MIN/1.73M2
EOSINOPHIL # BLD AUTO: 0.01 K/UL
EOSINOPHIL NFR BLD AUTO: 0.1 %
ESTIMATED AVERAGE GLUCOSE: 111 MG/DL
FOLATE SERPL-MCNC: >20 NG/ML
GLUCOSE QUALITATIVE U: NEGATIVE MG/DL
GLUCOSE SERPL-MCNC: 111 MG/DL
HBA1C MFR BLD HPLC: 5.5 %
HCT VFR BLD CALC: 46 %
HDLC SERPL-MCNC: 34 MG/DL
HGB BLD-MCNC: 14.6 G/DL
IMM GRANULOCYTES NFR BLD AUTO: 0.6 %
KETONES URINE: 15 MG/DL
LDLC SERPL CALC-MCNC: 113 MG/DL
LEUKOCYTE ESTERASE URINE: ABNORMAL
LYMPHOCYTES # BLD AUTO: 1.72 K/UL
LYMPHOCYTES NFR BLD AUTO: 20 %
MAGNESIUM SERPL-MCNC: 2.3 MG/DL
MAN DIFF?: NORMAL
MCHC RBC-ENTMCNC: 31.5 PG
MCHC RBC-ENTMCNC: 31.7 GM/DL
MCV RBC AUTO: 99.1 FL
MONOCYTES # BLD AUTO: 0.39 K/UL
MONOCYTES NFR BLD AUTO: 4.5 %
NEUTROPHILS # BLD AUTO: 6.42 K/UL
NEUTROPHILS NFR BLD AUTO: 74.6 %
NITRITE URINE: NEGATIVE
NONHDLC SERPL-MCNC: 167 MG/DL
PH URINE: 5.5
PLATELET # BLD AUTO: 343 K/UL
POTASSIUM SERPL-SCNC: 4 MMOL/L
PROT SERPL-MCNC: 7.5 G/DL
PROTEIN URINE: NEGATIVE MG/DL
RBC # BLD: 4.64 M/UL
RBC # FLD: 12.2 %
SODIUM SERPL-SCNC: 137 MMOL/L
SPECIFIC GRAVITY URINE: >1.03
TRIGL SERPL-MCNC: 312 MG/DL
TSH SERPL-ACNC: 1.35 UIU/ML
URATE SERPL-MCNC: 4.3 MG/DL
UROBILINOGEN URINE: 0.2 MG/DL
VIT B12 SERPL-MCNC: 655 PG/ML
WBC # FLD AUTO: 8.61 K/UL

## 2024-01-30 NOTE — PHYSICAL EXAM
[No Acute Distress] : no acute distress [Well Nourished] : well nourished [Well Developed] : well developed [Well-Appearing] : well-appearing [Normal Voice/Communication] : normal voice/communication [Normal Sclera/Conjunctiva] : normal sclera/conjunctiva [PERRL] : pupils equal round and reactive to light [EOMI] : extraocular movements intact [Fundoscopic Exam Performed] : fundoscopic ~T exam ~C was performed [20/___] : left eye 20/[unfilled] [Snellen] : acuity screening with Snellen chart [Normal Outer Ear/Nose] : the outer ears and nose were normal in appearance [Normal Oropharynx] : the oropharynx was normal [Normal TMs] : both tympanic membranes were normal [Normal Nasal Mucosa] : the nasal mucosa was normal [No JVD] : no jugular venous distention [No Lymphadenopathy] : no lymphadenopathy [Supple] : supple [Thyroid Normal, No Nodules] : the thyroid was normal and there were no nodules present [No Respiratory Distress] : no respiratory distress  [No Accessory Muscle Use] : no accessory muscle use [Clear to Auscultation] : lungs were clear to auscultation bilaterally [Normal Percussion] : the chest was normal to percussion [Normal Rate] : normal rate  [Regular Rhythm] : with a regular rhythm [Normal S1, S2] : normal S1 and S2 [No Murmur] : no murmur heard [No Carotid Bruits] : no carotid bruits [No Abdominal Bruit] : a ~M bruit was not heard ~T in the abdomen [No Varicosities] : no varicosities [Pedal Pulses Present] : the pedal pulses are present [No Edema] : there was no peripheral edema [No Palpable Aorta] : no palpable aorta [No Extremity Clubbing/Cyanosis] : no extremity clubbing/cyanosis [Normal Appearance] : normal in appearance [No Nipple Discharge] : no nipple discharge [No Axillary Lymphadenopathy] : no axillary lymphadenopathy [Soft] : abdomen soft [Non Tender] : non-tender [Non-distended] : non-distended [No Masses] : no abdominal mass palpated [No HSM] : no HSM [Normal Bowel Sounds] : normal bowel sounds [No Hernias] : no hernias [External Female Genitalia] : normal external genitalia [Normal Supraclavicular Nodes] : no supraclavicular lymphadenopathy [Normal Axillary Nodes] : no axillary lymphadenopathy [Normal Posterior Cervical Nodes] : no posterior cervical lymphadenopathy [Normal Anterior Cervical Nodes] : no anterior cervical lymphadenopathy [Normal Inguinal Nodes] : no inguinal lymphadenopathy [Normal Femoral Nodes] : no femoral lymphadenopathy [No CVA Tenderness] : no CVA  tenderness [No Spinal Tenderness] : no spinal tenderness [No Joint Swelling] : no joint swelling [Grossly Normal Strength/Tone] : grossly normal strength/tone [No Rash] : no rash [No Skin Lesions] : no skin lesions [Coordination Grossly Intact] : coordination grossly intact [No Focal Deficits] : no focal deficits [Normal Gait] : normal gait [Deep Tendon Reflexes (DTR)] : deep tendon reflexes were 2+ and symmetric [Speech Grossly Normal] : speech grossly normal [Memory Grossly Normal] : memory grossly normal [Normal Affect] : the affect was normal [Alert and Oriented x3] : oriented to person, place, and time [Normal Mood] : the mood was normal [Normal Insight/Judgement] : insight and judgment were intact [Peripheral Vision Was Full To Confrontation Bilaterally] : visual fields were full to confrontation bilaterally [Peripheral Vision Was Full To Confrontation Right Eye] : visual fields were full to confrontation on the right [Peripheral Vision Was Full To Confrontation Left Eye] : visual fields were full to confrontation on the left [Kyphosis] : no kyphosis [Scoliosis] : no scoliosis [Acne] : no acne [FreeTextEntry1] : Deferred

## 2024-01-30 NOTE — REVIEW OF SYSTEMS
[Fever] : no fever [Chills] : no chills [Hot Flashes] : no hot flashes [Fatigue] : no fatigue [Night Sweats] : no night sweats [Recent Change In Weight] : ~T no recent weight change [Discharge] : no discharge [Pain] : no pain [Redness] : no redness [Dryness] : no dryness  [Vision Problems] : no vision problems [Itching] : no itching [Earache] : no earache [Hearing Loss] : no hearing loss [Nosebleed] : no nosebleeds [Hoarseness] : no hoarseness [Nasal Discharge] : no nasal discharge [Sore Throat] : no sore throat [Postnasal Drip] : no postnasal drip [Chest Pain] : no chest pain [Palpitations] : no palpitations [Leg Claudication] : no leg claudication [Lower Ext Edema] : no lower extremity edema [Orthopnea] : no orthopnea [Paroxysmal Nocturnal Dyspnea] : no paroxysmal nocturnal dyspnea [Shortness Of Breath] : no shortness of breath [Wheezing] : no wheezing [Cough] : no cough [Dyspnea on Exertion] : no dyspnea on exertion [Abdominal Pain] : no abdominal pain [Nausea] : no nausea [Constipation] : no constipation [Diarrhea] : diarrhea [Vomiting] : no vomiting [Heartburn] : no heartburn [Melena] : no melena [Dysuria] : no dysuria [Incontinence] : no incontinence [Nocturia] : no nocturia [Poor Libido] : libido not poor [Frequency] : no frequency [Hematuria] : no hematuria [Vaginal Discharge] : no vaginal discharge [Dysmenorrhea] : no dysmenorrhea [Joint Pain] : no joint pain [Joint Stiffness] : no joint stiffness [Joint Swelling] : no joint swelling [Muscle Weakness] : no muscle weakness [Muscle Pain] : no muscle pain [Back Pain] : no back pain [Mole Changes] : no mole changes [Nail Changes] : no nail changes [Hair Changes] : no hair changes [Skin Rash] : no skin rash [Headache] : no headache [Dizziness] : no dizziness [Fainting] : no fainting [Memory Loss] : no memory loss [Unsteady Walking] : no ataxia [Suicidal] : not suicidal [Insomnia] : no insomnia [Anxiety] : no anxiety [Depression] : no depression [Easy Bleeding] : no easy bleeding [Easy Bruising] : no easy bruising [Swollen Glands] : no swollen glands

## 2024-01-30 NOTE — HISTORY OF PRESENT ILLNESS
[FreeTextEntry1] : Complete Physical Examination. [de-identified] : 38-year-old  female presents for Complete Physical Examination. Developed acute COVID-19 infection 01/24/24-(+)rapid PCR test at [facility]-01/26/2024. Symptoms are improving over the last 24 hours. Also, has developed urinary urgency and frequency without dysuria, turbid or foul-smelling urine for the last few days. Otherwise, feels well in general. No other new symptoms. Tolerating all prescription medications. Non-smoker. Social alcohol intake only. Immunizations are up to date. All preventative services were reviewed in detail and appear to be current for age. Followed by multiple medical specialties.

## 2024-01-30 NOTE — HEALTH RISK ASSESSMENT
[Excellent] : ~his/her~  mood as  excellent [Never (0 pts)] : Never (0 points) [No] : In the past 12 months have you used drugs other than those required for medical reasons? No [No falls in past year] : Patient reported no falls in the past year [0] : 2) Feeling down, depressed, or hopeless: Not at all (0) [PHQ-2 Negative - No further assessment needed] : PHQ-2 Negative - No further assessment needed [HIV test declined] : HIV test declined [Hepatitis C test declined] : Hepatitis C test declined [None] : None [Alone] : lives alone [Employed] : employed [Graduate School] : graduate school [] :  [# Of Children ___] : has [unfilled] children [Feels Safe at Home] : Feels safe at home [Fully functional (bathing, dressing, toileting, transferring, walking, feeding)] : Fully functional (bathing, dressing, toileting, transferring, walking, feeding) [Fully functional (using the telephone, shopping, preparing meals, housekeeping, doing laundry, using] : Fully functional and needs no help or supervision to perform IADLs (using the telephone, shopping, preparing meals, housekeeping, doing laundry, using transportation, managing medications and managing finances) [Reports normal functional visual acuity (ie: able to read med bottle)] : Reports normal functional visual acuity [Smoke Detector] : smoke detector [Carbon Monoxide Detector] : carbon monoxide detector [Safety elements used in home] : safety elements used in home [Seat Belt] :  uses seat belt [Sunscreen] : uses sunscreen [TB Exposure] : is being exposed to tuberculosis [Never] : Never [No Retinopathy] : No retinopathy [Patient reported mammogram was abnormal] : Patient reported mammogram was abnormal [Patient reported PAP Smear was abnormal] : Patient reported PAP Smear was abnormal [Patient reported bone density results were abnormal] : Patient reported bone density results were abnormal [Patient reported colonoscopy was abnormal] : Patient reported colonoscopy was abnormal [Audit-CScore] : 0 [de-identified] : Walking, eliptical, free weights [de-identified] : balanced [[id number]] : 0 [EyeExamDate] : 01/23 [Change in mental status noted] : No change in mental status noted [Language] : denies difficulty with language [Behavior] : denies difficulty with behavior [Learning/Retaining New Information] : denies difficulty learning/retaining new information [Handling Complex Tasks] : denies difficulty handling complex tasks [Reasoning] : denies difficulty with reasoning [Spatial Ability and Orientation] : denies difficulty with spatial ability and orientation [Sexually Active] : not sexually active [High Risk Behavior] : no high risk behavior [Reports changes in hearing] : Reports no changes in hearing [Reports changes in vision] : Reports no changes in vision [Reports changes in dental health] : Reports no changes in dental health [Guns at Home] : no guns at home [Travel to Developing Areas] : does not  travel to developing areas [MammogramDate] : 03/23 [MammogramComments] : appointment 3/24 [PapSmearDate] : 06/23 [BoneDensityDate] : 10/22 [ColonoscopyDate] : 02/23 [ColonoscopyComments] : F/U 2 yrs [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

## 2024-01-31 LAB — BACTERIA UR CULT: NORMAL

## 2024-02-18 ENCOUNTER — NON-APPOINTMENT (OUTPATIENT)
Age: 39
End: 2024-02-18

## 2024-02-21 ENCOUNTER — NON-APPOINTMENT (OUTPATIENT)
Age: 39
End: 2024-02-21

## 2024-03-05 ENCOUNTER — RESULT REVIEW (OUTPATIENT)
Age: 39
End: 2024-03-05

## 2024-03-27 ENCOUNTER — RX RENEWAL (OUTPATIENT)
Age: 39
End: 2024-03-27

## 2024-04-10 RX ORDER — LORAZEPAM 0.5 MG/1
0.5 TABLET ORAL
Qty: 60 | Refills: 0 | Status: ACTIVE | COMMUNITY
Start: 2021-03-26 | End: 1900-01-01

## 2024-04-24 ENCOUNTER — APPOINTMENT (OUTPATIENT)
Dept: FAMILY MEDICINE | Facility: CLINIC | Age: 39
End: 2024-04-24
Payer: COMMERCIAL

## 2024-04-24 VITALS
RESPIRATION RATE: 16 BRPM | HEART RATE: 79 BPM | HEIGHT: 66 IN | WEIGHT: 144 LBS | BODY MASS INDEX: 23.14 KG/M2 | TEMPERATURE: 99.6 F | DIASTOLIC BLOOD PRESSURE: 70 MMHG | OXYGEN SATURATION: 99 % | SYSTOLIC BLOOD PRESSURE: 102 MMHG

## 2024-04-24 DIAGNOSIS — E78.5 HYPERLIPIDEMIA, UNSPECIFIED: ICD-10-CM

## 2024-04-24 DIAGNOSIS — R73.03 PREDIABETES.: ICD-10-CM

## 2024-04-24 DIAGNOSIS — F42.9 OBSESSIVE-COMPULSIVE DISORDER, UNSPECIFIED: ICD-10-CM

## 2024-04-24 DIAGNOSIS — R53.83 OTHER FATIGUE: ICD-10-CM

## 2024-04-24 DIAGNOSIS — R25.3 FASCICULATION: ICD-10-CM

## 2024-04-24 DIAGNOSIS — F41.1 GENERALIZED ANXIETY DISORDER: ICD-10-CM

## 2024-04-24 PROCEDURE — 99214 OFFICE O/P EST MOD 30 MIN: CPT

## 2024-04-24 PROCEDURE — 36410 VNPNXR 3YR/> PHY/QHP DX/THER: CPT

## 2024-04-26 ENCOUNTER — RX RENEWAL (OUTPATIENT)
Age: 39
End: 2024-04-26

## 2024-04-26 LAB
25(OH)D3 SERPL-MCNC: 59.5 NG/ML
ALBUMIN SERPL ELPH-MCNC: 4.6 G/DL
ALDOLASE SERPL-CCNC: 6.3 U/L
ALP BLD-CCNC: 94 U/L
ALT SERPL-CCNC: 21 U/L
ANION GAP SERPL CALC-SCNC: 14 MMOL/L
AST SERPL-CCNC: 25 U/L
BASOPHILS # BLD AUTO: 0.06 K/UL
BASOPHILS NFR BLD AUTO: 0.8 %
BILIRUB SERPL-MCNC: 0.5 MG/DL
BUN SERPL-MCNC: 10 MG/DL
CALCIUM SERPL-MCNC: 9.5 MG/DL
CHLORIDE SERPL-SCNC: 100 MMOL/L
CHOLEST SERPL-MCNC: 215 MG/DL
CK SERPL-CCNC: 203 U/L
CO2 SERPL-SCNC: 23 MMOL/L
CREAT SERPL-MCNC: 0.8 MG/DL
EGFR: 95 ML/MIN/1.73M2
EOSINOPHIL # BLD AUTO: 0.25 K/UL
EOSINOPHIL NFR BLD AUTO: 3.4 %
ESTIMATED AVERAGE GLUCOSE: 111 MG/DL
FOLATE SERPL-MCNC: >20 NG/ML
GLUCOSE SERPL-MCNC: 82 MG/DL
HBA1C MFR BLD HPLC: 5.5 %
HCT VFR BLD CALC: 44.2 %
HDLC SERPL-MCNC: 36 MG/DL
HGB BLD-MCNC: 14.4 G/DL
IMM GRANULOCYTES NFR BLD AUTO: 0.3 %
LDLC SERPL CALC-MCNC: 121 MG/DL
LYMPHOCYTES # BLD AUTO: 3.09 K/UL
LYMPHOCYTES NFR BLD AUTO: 42.3 %
MAGNESIUM SERPL-MCNC: 2.4 MG/DL
MAN DIFF?: NORMAL
MCHC RBC-ENTMCNC: 31.4 PG
MCHC RBC-ENTMCNC: 32.6 GM/DL
MCV RBC AUTO: 96.3 FL
MONOCYTES # BLD AUTO: 0.55 K/UL
MONOCYTES NFR BLD AUTO: 7.5 %
NEUTROPHILS # BLD AUTO: 3.34 K/UL
NEUTROPHILS NFR BLD AUTO: 45.7 %
NONHDLC SERPL-MCNC: 179 MG/DL
PLATELET # BLD AUTO: 302 K/UL
POTASSIUM SERPL-SCNC: 4.1 MMOL/L
PROT SERPL-MCNC: 7.6 G/DL
RBC # BLD: 4.59 M/UL
RBC # FLD: 12 %
SODIUM SERPL-SCNC: 138 MMOL/L
TRIGL SERPL-MCNC: 332 MG/DL
TSH SERPL-ACNC: 1.64 UIU/ML
URATE SERPL-MCNC: 5.3 MG/DL
VIT B12 SERPL-MCNC: 591 PG/ML
WBC # FLD AUTO: 7.31 K/UL

## 2024-04-26 RX ORDER — CLONAZEPAM 0.5 MG/1
0.5 TABLET ORAL
Qty: 60 | Refills: 0 | Status: ACTIVE | COMMUNITY
Start: 2019-09-16 | End: 1900-01-01

## 2024-04-28 ENCOUNTER — RX RENEWAL (OUTPATIENT)
Age: 39
End: 2024-04-28

## 2024-04-29 PROBLEM — F42.9 OCD (OBSESSIVE COMPULSIVE DISORDER): Status: ACTIVE | Noted: 2021-05-14

## 2024-04-29 PROBLEM — R53.83 OTHER FATIGUE: Status: ACTIVE | Noted: 2020-10-21

## 2024-04-29 PROBLEM — F41.1 GENERALIZED ANXIETY DISORDER: Status: ACTIVE | Noted: 2018-11-28

## 2024-04-29 PROBLEM — E78.5 HYPERLIPIDEMIA, UNSPECIFIED HYPERLIPIDEMIA TYPE: Status: ACTIVE | Noted: 2022-10-03

## 2024-04-29 PROBLEM — R73.03 PREDIABETES: Status: ACTIVE | Noted: 2023-06-22

## 2024-04-29 PROBLEM — R25.3 MUSCULAR FASCICULATION: Status: ACTIVE | Noted: 2024-04-24

## 2024-04-29 NOTE — REVIEW OF SYSTEMS
[Fatigue] : fatigue [Negative] : Heme/Lymph [Fever] : no fever [Chills] : no chills [Chest Pain] : no chest pain [Palpitations] : no palpitations [Shortness Of Breath] : no shortness of breath [Dyspnea on Exertion] : no dyspnea on exertion [Abdominal Pain] : no abdominal pain [Muscle Weakness] : no muscle weakness [Muscle Pain] : no muscle pain [Skin Rash] : no skin rash [Headache] : no headache [Unsteady Walking] : no ataxia [Swollen Glands] : no swollen glands Tissue Cultured Epidermal Autograft Text: The defect edges were debeveled with a #15 scalpel blade.  Given the location of the defect, shape of the defect and the proximity to free margins a tissue cultured epidermal autograft was deemed most appropriate.  The graft was then trimmed to fit the size of the defect.  The graft was then placed in the primary defect and oriented appropriately.

## 2024-04-29 NOTE — PHYSICAL EXAM
[No Acute Distress] : no acute distress [Well Nourished] : well nourished [Well Developed] : well developed [Well-Appearing] : well-appearing [Normal Voice/Communication] : normal voice/communication [Normal Sclera/Conjunctiva] : normal sclera/conjunctiva [PERRL] : pupils equal round and reactive to light [EOMI] : extraocular movements intact [Normal Outer Ear/Nose] : the outer ears and nose were normal in appearance [Normal Oropharynx] : the oropharynx was normal [No JVD] : no jugular venous distention [No Lymphadenopathy] : no lymphadenopathy [Supple] : supple [No Respiratory Distress] : no respiratory distress  [Clear to Auscultation] : lungs were clear to auscultation bilaterally [Normal Rate] : normal rate  [Regular Rhythm] : with a regular rhythm [Normal S1, S2] : normal S1 and S2 [No Murmur] : no murmur heard [No Carotid Bruits] : no carotid bruits [Pedal Pulses Present] : the pedal pulses are present [No Edema] : there was no peripheral edema [Soft] : abdomen soft [Non Tender] : non-tender [No HSM] : no HSM [Normal Bowel Sounds] : normal bowel sounds [Normal Axillary Nodes] : no axillary lymphadenopathy [Normal Posterior Cervical Nodes] : no posterior cervical lymphadenopathy [Normal Anterior Cervical Nodes] : no anterior cervical lymphadenopathy [Normal Inguinal Nodes] : no inguinal lymphadenopathy [No CVA Tenderness] : no CVA  tenderness [No Spinal Tenderness] : no spinal tenderness [No Joint Swelling] : no joint swelling [Grossly Normal Strength/Tone] : grossly normal strength/tone [No Rash] : no rash [Coordination Grossly Intact] : coordination grossly intact [No Focal Deficits] : no focal deficits [Normal Gait] : normal gait [de-identified] : Tongue-no fasciculations [de-identified] : No muscular fasciculations noted

## 2024-04-29 NOTE — HISTORY OF PRESENT ILLNESS
[FreeTextEntry1] : Muscle twitching [de-identified] : Patient presents with history of intermittent muscular twitching involving the right foot and left forearm over the last 2 months. Was evaluated by neurology-Emi Bhardwaj MD-on 04/04/2024 and underwent NCS/EMG procedures which were normal. In addition, laboratory testing which revealed normal calcium, magnesium, CPK and aldolase. Patient continues to have symptoms and wishes to have further laboratory testing. Neurology consult notes and procedure notes were reviewed with the patient.

## 2024-05-13 ENCOUNTER — RESULT REVIEW (OUTPATIENT)
Age: 39
End: 2024-05-13

## 2024-05-15 RX ORDER — ESCITALOPRAM OXALATE 5 MG/1
5 TABLET ORAL
Qty: 90 | Refills: 0 | Status: ACTIVE | COMMUNITY
Start: 2023-06-30 | End: 1900-01-01

## 2024-05-15 RX ORDER — CYCLOBENZAPRINE HYDROCHLORIDE 5 MG/1
5 TABLET, FILM COATED ORAL 3 TIMES DAILY
Qty: 120 | Refills: 0 | Status: ACTIVE | COMMUNITY
Start: 2024-05-15 | End: 1900-01-01

## 2024-06-05 ENCOUNTER — APPOINTMENT (OUTPATIENT)
Dept: OBGYN | Facility: CLINIC | Age: 39
End: 2024-06-05
Payer: COMMERCIAL

## 2024-06-05 ENCOUNTER — LABORATORY RESULT (OUTPATIENT)
Age: 39
End: 2024-06-05

## 2024-06-05 DIAGNOSIS — N91.5 OLIGOMENORRHEA, UNSPECIFIED: ICD-10-CM

## 2024-06-05 DIAGNOSIS — Z01.419 ENCOUNTER FOR GYNECOLOGICAL EXAMINATION (GENERAL) (ROUTINE) W/OUT ABNORMAL FINDINGS: ICD-10-CM

## 2024-06-05 PROCEDURE — 81003 URINALYSIS AUTO W/O SCOPE: CPT | Mod: QW

## 2024-06-05 PROCEDURE — 99396 PREV VISIT EST AGE 40-64: CPT

## 2024-06-07 ENCOUNTER — RX RENEWAL (OUTPATIENT)
Age: 39
End: 2024-06-07

## 2024-06-07 RX ORDER — DESOGESTREL/ETHINYL ESTRADIOL AND ETHINYL ESTRADIOL 21-5 (28)
0.15-0.02/0.01 KIT ORAL
Qty: 3 | Refills: 4 | Status: ACTIVE | COMMUNITY
Start: 2019-05-10 | End: 1900-01-01

## 2024-06-07 NOTE — PHYSICAL EXAM
[Chaperone Declined] : Patient declined chaperone [Appropriately responsive] : appropriately responsive [Alert] : alert [No Acute Distress] : no acute distress [No Lymphadenopathy] : no lymphadenopathy [Soft] : soft [Non-tender] : non-tender [Non-distended] : non-distended [No HSM] : No HSM [No Lesions] : no lesions [No Mass] : no mass [Oriented x3] : oriented x3 [Examination Of The Breasts] : a normal appearance [No Masses] : no breast masses were palpable [Labia Majora] : normal [Labia Minora] : normal [Normal] : normal [Uterine Adnexae] : normal [FreeTextEntry5] : Os finder used to probe cervix- easily passes instrument 4cm

## 2024-06-07 NOTE — HISTORY OF PRESENT ILLNESS
[TextBox_4] : 39yo  here for annual gyn. 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  1)She has been amenorrheic on Kariva which she takes for her skin b/c she has acne otherwise. Wants to test her TFTs (was normal in January)  2) She has gone to Dr. Claros/ sondra Dillon for egg freezing.  Needs labs today for prep b/c she is planning on getting IVF soon. H/o OHSS in 3/2021- estradiol level went to 7000 per pt. She did not need hospitalization but had ascites, abd pain, etc. Eventually lost weight and needed Zoloft and Zyprexa to gain it back and stabilize her moods. She is still on it now. Now back on kariva after retrieving 22 eggs.  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  4)H/o ectopic pregn- treated with R salpingectomy years ago in [place] - they converted to expl lap but she doesn't know why. She has had an HSG and patent L fallopian tube but is nervous about getting pregnant when she tries in the near future.  5)Breast lumpectomy- benign  She is no longer in a relationship and has decided to have a baby independently. 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

## 2024-06-11 LAB
25(OH)D3 SERPL-MCNC: 63 NG/ML
ABO + RH PNL BLD: NORMAL
ALBUMIN SERPL ELPH-MCNC: 4.6 G/DL
ALP BLD-CCNC: 95 U/L
ALT SERPL-CCNC: 24 U/L
ANION GAP SERPL CALC-SCNC: 16 MMOL/L
APPEARANCE: CLEAR
AST SERPL-CCNC: 25 U/L
BACTERIA UR CULT: NORMAL
BASOPHILS # BLD AUTO: 0.02 K/UL
BASOPHILS NFR BLD AUTO: 0.3 %
BILIRUB SERPL-MCNC: 0.8 MG/DL
BILIRUB UR QL STRIP: NORMAL
BILIRUBIN URINE: NEGATIVE
BLD GP AB SCN SERPL QL: NORMAL
BLOOD URINE: NEGATIVE
BUN SERPL-MCNC: 9 MG/DL
CA-I SERPL-SCNC: 5.1 MG/DL
CALCIUM SERPL-MCNC: 9.9 MG/DL
CHLORIDE SERPL-SCNC: 103 MMOL/L
CLARITY UR: NORMAL
CO2 SERPL-SCNC: 19 MMOL/L
COLLECTION METHOD: NORMAL
COLOR: YELLOW
CREAT SERPL-MCNC: 0.67 MG/DL
CYTOLOGY CVX/VAG DOC THIN PREP: NORMAL
EGFR: 113 ML/MIN/1.73M2
EOSINOPHIL # BLD AUTO: 0.09 K/UL
EOSINOPHIL NFR BLD AUTO: 1.4 %
ESTIMATED AVERAGE GLUCOSE: 114 MG/DL
GLUCOSE QUALITATIVE U: NEGATIVE MG/DL
GLUCOSE SERPL-MCNC: 92 MG/DL
GLUCOSE UR-MCNC: NORMAL
HBA1C MFR BLD HPLC: 5.6 %
HBV SURFACE AG SER QL: NONREACTIVE
HCG UR QL: 0.2 EU/DL
HCT VFR BLD CALC: 45.4 %
HCV AB SER QL: NONREACTIVE
HCV S/CO RATIO: 0.09 S/CO
HGB BLD-MCNC: 14.5 G/DL
HGB UR QL STRIP.AUTO: NORMAL
HIV1+2 AB SPEC QL IA.RAPID: NONREACTIVE
HPV HIGH+LOW RISK DNA PNL CVX: DETECTED
HSV 1+2 IGG SER IA-IMP: NEGATIVE
HSV 1+2 IGG SER IA-IMP: POSITIVE
HSV1 IGG SER QL: 4.15 INDEX
HSV2 IGG SER QL: 0.05 INDEX
IMM GRANULOCYTES NFR BLD AUTO: 0.3 %
KETONES UR-MCNC: NORMAL
KETONES URINE: NEGATIVE MG/DL
LEUKOCYTE ESTERASE UR QL STRIP: NORMAL
LEUKOCYTE ESTERASE URINE: ABNORMAL
LYMPHOCYTES # BLD AUTO: 2.12 K/UL
LYMPHOCYTES NFR BLD AUTO: 32.1 %
MAGNESIUM SERPL-MCNC: 2.2 MG/DL
MAN DIFF?: NORMAL
MCHC RBC-ENTMCNC: 31.1 PG
MCHC RBC-ENTMCNC: 31.9 GM/DL
MCV RBC AUTO: 97.4 FL
MEV IGG FLD QL IA: 160 AU/ML
MEV IGG+IGM SER-IMP: POSITIVE
MONOCYTES # BLD AUTO: 0.48 K/UL
MONOCYTES NFR BLD AUTO: 7.3 %
NEUTROPHILS # BLD AUTO: 3.88 K/UL
NEUTROPHILS NFR BLD AUTO: 58.6 %
NITRITE UR QL STRIP: NORMAL
NITRITE URINE: NEGATIVE
PH UR STRIP: 5.5
PH URINE: 6
PLATELET # BLD AUTO: 280 K/UL
POTASSIUM SERPL-SCNC: 3.9 MMOL/L
PROLACTIN SERPL-MCNC: 12.7 NG/ML
PROT SERPL-MCNC: 7.7 G/DL
PROT UR STRIP-MCNC: NORMAL
PROTEIN URINE: NEGATIVE MG/DL
RBC # BLD: 4.66 M/UL
RBC # FLD: 12.3 %
RUBV IGG FLD-ACNC: 3.4 INDEX
RUBV IGG SER-IMP: POSITIVE
SODIUM SERPL-SCNC: 138 MMOL/L
SP GR UR STRIP: <=1.005
SPECIFIC GRAVITY URINE: 1.01
T PALLIDUM AB SER QL IA: NEGATIVE
TSH SERPL-ACNC: 1.3 UIU/ML
UROBILINOGEN URINE: 0.2 MG/DL
VIT B12 SERPL-MCNC: 574 PG/ML
VZV AB TITR SER: POSITIVE
VZV IGG SER IF-ACNC: 1424 INDEX
WBC # FLD AUTO: 6.61 K/UL

## 2024-06-13 ENCOUNTER — APPOINTMENT (OUTPATIENT)
Dept: OBGYN | Facility: CLINIC | Age: 39
End: 2024-06-13
Payer: COMMERCIAL

## 2024-06-13 PROCEDURE — 90471 IMMUNIZATION ADMIN: CPT

## 2024-06-13 PROCEDURE — 90651 9VHPV VACCINE 2/3 DOSE IM: CPT

## 2024-06-25 LAB
HERPES VIRUS 6 ANTIBODIES INTERPRETATION: NORMAL
HHV6 IGM SER QL: NORMAL
HHV6 IGM SER-ACNC: ABNORMAL

## 2024-08-12 ENCOUNTER — APPOINTMENT (OUTPATIENT)
Dept: FAMILY MEDICINE | Facility: CLINIC | Age: 39
End: 2024-08-12
Payer: COMMERCIAL

## 2024-08-12 VITALS
SYSTOLIC BLOOD PRESSURE: 112 MMHG | RESPIRATION RATE: 16 BRPM | HEIGHT: 66 IN | DIASTOLIC BLOOD PRESSURE: 80 MMHG | HEART RATE: 82 BPM | WEIGHT: 140 LBS | BODY MASS INDEX: 22.5 KG/M2 | TEMPERATURE: 98 F | OXYGEN SATURATION: 97 %

## 2024-08-12 DIAGNOSIS — K51.90 ULCERATIVE COLITIS, UNSPECIFIED, W/OUT COMPLICATIONS: ICD-10-CM

## 2024-08-12 DIAGNOSIS — R25.3 FASCICULATION: ICD-10-CM

## 2024-08-12 PROCEDURE — 99214 OFFICE O/P EST MOD 30 MIN: CPT

## 2024-08-12 PROCEDURE — G2211 COMPLEX E/M VISIT ADD ON: CPT

## 2024-08-12 RX ORDER — CYCLOBENZAPRINE HYDROCHLORIDE 5 MG/1
5 TABLET, FILM COATED ORAL
Qty: 120 | Refills: 1 | Status: ACTIVE | COMMUNITY
Start: 2024-08-12 | End: 1900-01-01

## 2024-08-13 ENCOUNTER — RESULT REVIEW (OUTPATIENT)
Age: 39
End: 2024-08-13

## 2024-08-13 ENCOUNTER — APPOINTMENT (OUTPATIENT)
Dept: HEMATOLOGY ONCOLOGY | Facility: CLINIC | Age: 39
End: 2024-08-13
Payer: COMMERCIAL

## 2024-08-13 VITALS
HEART RATE: 92 BPM | OXYGEN SATURATION: 100 % | RESPIRATION RATE: 16 BRPM | BODY MASS INDEX: 22.53 KG/M2 | WEIGHT: 140.19 LBS | DIASTOLIC BLOOD PRESSURE: 77 MMHG | TEMPERATURE: 97.8 F | HEIGHT: 66 IN | SYSTOLIC BLOOD PRESSURE: 143 MMHG

## 2024-08-13 DIAGNOSIS — R92.30 DENSE BREASTS, UNSPECIFIED: ICD-10-CM

## 2024-08-13 DIAGNOSIS — D50.9 IRON DEFICIENCY ANEMIA, UNSPECIFIED: ICD-10-CM

## 2024-08-13 DIAGNOSIS — Z82.0 FAMILY HISTORY OF EPILEPSY AND OTHER DISEASES OF THE NERVOUS SYSTEM: ICD-10-CM

## 2024-08-13 PROCEDURE — 99213 OFFICE O/P EST LOW 20 MIN: CPT | Mod: 25

## 2024-08-13 PROCEDURE — 36415 COLL VENOUS BLD VENIPUNCTURE: CPT

## 2024-08-13 NOTE — ASSESSMENT
[FreeTextEntry1] : 39 year old female with US, iron deficiency anemia   # IBD/ Premenopausal state without heavy bleeding Fatigue, tiredness, exhaustion, nausea, vomiting, headaches Suspect her fatigue is multifactorial  However fatigue continues to affect her routine- ? related to inflammation related to IBD vs CTD vs fibromyalgia AIDEN speckled with 1:80 Repeat ferritin, if trending down, injectafer x 1 prior to IVF  #Dehydration IV NS prn   Headaches MRI brain showed Nonspecific few scattered punctate foci of T2 and Flair signal hyperintensities within  the white matter, unchanged; findings may be seen in patient with migraine headaches, vasculitis,  demyelinating disease, Lyme disease and viral illness Follow with Dr Bhardwaj (neurology)  # Left Jugulodigastric LN om MRI 8/22 2 cm x 1.5 cm x 1 cm Incidentally noted on MRI brain Reactive in appearance Reviewed with radiologist - last MRI 1/2023- no evidence of adenopathy  # Basal cell nose- derm following -start B3 500 mg PO BID #IVF - no period since June 2022  # [place]/ [place] descent   MGM - colon cancer - 70 PGM - ovarian ca- 68 Maternal cousin - ovarian 37 Maunt - breast ca age 40 Brother with ALS positive genetics for frontal temporal disease and [id number] gene- may start hospice due to rapid decline  Patient offered genetic testing. We discussed: Plan for genetic panel.   Reviewed with patient impact of positive vs negative results and that test might not be informative or .  We discussed that blood related family members might be carrying the same genetic mutation. Test confidentiality.  Options or limitations of medical surveillance and strategies for prevention after genetic testing results. Importance of sharing genetic test results with at-risk relatives they might benefit from this information.  Plan for follow up after testing    # Mamogram/ sono WNL-  pt never went for IVF due to brothers ALS dx- will decide after genetics is back on pursing.    Case and mgmt discussed with Dr. Mcrae- return as needed reg labs and irons

## 2024-08-13 NOTE — REVIEW OF SYSTEMS
[Recent Change In Weight] : ~T recent weight change [Insomnia] : insomnia [Anxiety] : anxiety [Depression] : depression [Negative] : Heme/Lymph [Fatigue] : no fatigue [Chest Pain] : no chest pain [Palpitations] : no palpitations [Lower Ext Edema] : no lower extremity edema [Shortness Of Breath] : no shortness of breath [Wheezing] : no wheezing [Cough] : no cough [Dyspnea on Exertion] : no dyspnea on exertion [Abdominal Pain] : no abdominal pain [Joint Pain] : no joint pain [Nail Changes] : no nail changes [Hair Changes] : no hair changes [Skin Rash] : no skin rash [Suicidal] : not suicidal [Swollen Glands] : no swollen glands [FreeTextEntry2] : Decreased weight-4 lbs

## 2024-08-13 NOTE — HISTORY OF PRESENT ILLNESS
[FreeTextEntry1] : Follow-up of MDD/ELIA symptoms, chronic insomnia and prescription refills. [de-identified] : Patient presents for follow-up of major depressive disorder, generalized anxiety disorder, chronic insomnia and medication titration. Presently taking escitalopram 5 mg/day and trazodone 50 mg HS, with good control of all effective symptoms. Psychiatrist increased escitalopram to 10 mg/day after last office visit, but now back down to 5 mg/day and doing well. Continues to have no therapist for talk therapy. Provided with several names, and patient will continue to attempt to arrange sessions. Ulcerative colitis and migraine symptoms remain stable. Well-controlled with present medication regimen. Patient's brother continues to fail with a final diagnosis made of ALS confirmed. Patient considering placing brother on hospice care at this time.

## 2024-08-13 NOTE — CONSULT LETTER
[Dear  ___] : Dear  [unfilled], [Consult Letter:] : I had the pleasure of evaluating your patient, [unfilled]. [Please see my note below.] : Please see my note below. [Consult Closing:] : Thank you very much for allowing me to participate in the care of this patient.  If you have any questions, please do not hesitate to contact me. [Sincerely,] : Sincerely, [DrAdams  ___] : Dr. YOUNG [FreeTextEntry3] : Sridhar Meneses MD, MPH\par  Attending Physician\par  Hematology Oncology\par  [facility] [place]\par  [facility]\par

## 2024-08-13 NOTE — HISTORY OF PRESENT ILLNESS
[FreeTextEntry1] : Follow-up of MDD/ELIA symptoms, chronic insomnia and prescription refills. [de-identified] : Patient presents for follow-up of major depressive disorder, generalized anxiety disorder, chronic insomnia and medication titration. Presently taking escitalopram 5 mg/day and trazodone 50 mg HS, with good control of all effective symptoms. Psychiatrist increased escitalopram to 10 mg/day after last office visit, but now back down to 5 mg/day and doing well. Continues to have no therapist for talk therapy. Provided with several names, and patient will continue to attempt to arrange sessions. Ulcerative colitis and migraine symptoms remain stable. Well-controlled with present medication regimen. Patient's brother continues to fail with a final diagnosis made of ALS confirmed. Patient considering placing brother on hospice care at this time.

## 2024-08-13 NOTE — PHYSICAL EXAM
[No Acute Distress] : no acute distress [Well Developed] : well developed [Well Nourished] : well nourished [Well-Appearing] : well-appearing [Normal Voice/Communication] : normal voice/communication [Normal Sclera/Conjunctiva] : normal sclera/conjunctiva [PERRL] : pupils equal round and reactive to light [EOMI] : extraocular movements intact [No JVD] : no jugular venous distention [Supple] : supple [No Respiratory Distress] : no respiratory distress  [Clear to Auscultation] : lungs were clear to auscultation bilaterally [Normal Rate] : normal rate  [Regular Rhythm] : with a regular rhythm [Normal S1, S2] : normal S1 and S2 [No Murmur] : no murmur heard [No Carotid Bruits] : no carotid bruits [Pedal Pulses Present] : the pedal pulses are present [No Edema] : there was no peripheral edema [Soft] : abdomen soft [Non Tender] : non-tender [No HSM] : no HSM [Normal Axillary Nodes] : no axillary lymphadenopathy [Normal Posterior Cervical Nodes] : no posterior cervical lymphadenopathy [Normal Anterior Cervical Nodes] : no anterior cervical lymphadenopathy [Normal Inguinal Nodes] : no inguinal lymphadenopathy [No CVA Tenderness] : no CVA  tenderness [No Joint Swelling] : no joint swelling [No Rash] : no rash [Coordination Grossly Intact] : coordination grossly intact [No Focal Deficits] : no focal deficits [Normal Gait] : normal gait

## 2024-08-13 NOTE — HISTORY OF PRESENT ILLNESS
[de-identified] : Ms Cuevas is a very pleasant 32 year old patient with IBD here with anemia\par  \par  IBD/UC vs Crohns since 2011- gets flair ups - liaza 4 tab/day. Canaza supp\par  Was recently on prednisone - stopped due to rash - 2-3 weeks\par  \par  LMP 4/5/18 - Are not heavy\par  \par  Every 2 months had small amount of blood in stool\par  \par  Feels tired and exhausted\par   [de-identified] : She is seen today for follow up for HECTOR- brother recently diagnosed with ALS- possible hospice  She has on and off diarrhea due to her GI issues FOllows with Dr Gonzalez - for GI

## 2024-08-14 PROBLEM — Z82.0 FAMILY HISTORY OF AMYOTROPHIC LATERAL SCLEROSIS: Status: ACTIVE | Noted: 2024-08-14

## 2024-08-19 ENCOUNTER — APPOINTMENT (OUTPATIENT)
Dept: OBGYN | Facility: CLINIC | Age: 39
End: 2024-08-19
Payer: COMMERCIAL

## 2024-08-19 ENCOUNTER — MED ADMIN CHARGE (OUTPATIENT)
Age: 39
End: 2024-08-19

## 2024-08-19 PROCEDURE — 90651 9VHPV VACCINE 2/3 DOSE IM: CPT

## 2024-08-19 PROCEDURE — 90471 IMMUNIZATION ADMIN: CPT

## 2024-08-22 ENCOUNTER — APPOINTMENT (OUTPATIENT)
Dept: FAMILY MEDICINE | Facility: CLINIC | Age: 39
End: 2024-08-22

## 2024-08-22 VITALS
RESPIRATION RATE: 16 BRPM | SYSTOLIC BLOOD PRESSURE: 128 MMHG | BODY MASS INDEX: 22.5 KG/M2 | TEMPERATURE: 97.9 F | WEIGHT: 140 LBS | OXYGEN SATURATION: 99 % | HEIGHT: 66 IN | DIASTOLIC BLOOD PRESSURE: 82 MMHG | HEART RATE: 83 BPM

## 2024-08-22 DIAGNOSIS — F41.1 GENERALIZED ANXIETY DISORDER: ICD-10-CM

## 2024-08-22 DIAGNOSIS — F32.1 MAJOR DEPRESSIVE DISORDER, SINGLE EPISODE, MODERATE: ICD-10-CM

## 2024-08-22 PROCEDURE — 99213 OFFICE O/P EST LOW 20 MIN: CPT

## 2024-08-23 NOTE — HISTORY OF PRESENT ILLNESS
[FreeTextEntry1] : Worsening anxiety. [de-identified] : Sending anxiety over brother's ALS diagnosis and declined. Patient had had recent genetic testing done for ALS and is becoming obsessed and anxious over her results which will be available next week. Therefore, will increase clonazepam dose and frequency until results are known.

## 2024-08-23 NOTE — HISTORY OF PRESENT ILLNESS
[FreeTextEntry1] : Worsening anxiety. [de-identified] : Sending anxiety over brother's ALS diagnosis and declined. Patient had had recent genetic testing done for ALS and is becoming obsessed and anxious over her results which will be available next week. Therefore, will increase clonazepam dose and frequency until results are known.

## 2024-08-26 ENCOUNTER — NON-APPOINTMENT (OUTPATIENT)
Age: 39
End: 2024-08-26

## 2024-09-12 ENCOUNTER — APPOINTMENT (OUTPATIENT)
Dept: HUMAN REPRODUCTION | Facility: CLINIC | Age: 39
End: 2024-09-12
Payer: COMMERCIAL

## 2024-09-12 PROCEDURE — 99214 OFFICE O/P EST MOD 30 MIN: CPT

## 2024-09-13 ENCOUNTER — APPOINTMENT (OUTPATIENT)
Dept: HUMAN REPRODUCTION | Facility: CLINIC | Age: 39
End: 2024-09-13
Payer: COMMERCIAL

## 2024-09-13 PROCEDURE — 76831 ECHO EXAM UTERUS: CPT

## 2024-09-13 PROCEDURE — 58340 CATHETER FOR HYSTEROGRAPHY: CPT

## 2024-09-14 ENCOUNTER — RX RENEWAL (OUTPATIENT)
Age: 39
End: 2024-09-14

## 2024-09-17 ENCOUNTER — APPOINTMENT (OUTPATIENT)
Dept: HUMAN REPRODUCTION | Facility: CLINIC | Age: 39
End: 2024-09-17

## 2024-09-21 ENCOUNTER — RX RENEWAL (OUTPATIENT)
Age: 39
End: 2024-09-21

## 2024-10-03 ENCOUNTER — APPOINTMENT (OUTPATIENT)
Dept: FAMILY MEDICINE | Facility: CLINIC | Age: 39
End: 2024-10-03
Payer: COMMERCIAL

## 2024-10-03 VITALS
RESPIRATION RATE: 16 BRPM | DIASTOLIC BLOOD PRESSURE: 70 MMHG | WEIGHT: 137 LBS | OXYGEN SATURATION: 98 % | SYSTOLIC BLOOD PRESSURE: 100 MMHG | HEART RATE: 84 BPM | BODY MASS INDEX: 22.02 KG/M2 | HEIGHT: 66 IN | TEMPERATURE: 98.1 F

## 2024-10-03 DIAGNOSIS — F32.1 MAJOR DEPRESSIVE DISORDER, SINGLE EPISODE, MODERATE: ICD-10-CM

## 2024-10-03 DIAGNOSIS — R25.3 FASCICULATION: ICD-10-CM

## 2024-10-03 DIAGNOSIS — F41.1 GENERALIZED ANXIETY DISORDER: ICD-10-CM

## 2024-10-03 DIAGNOSIS — F42.9 OBSESSIVE-COMPULSIVE DISORDER, UNSPECIFIED: ICD-10-CM

## 2024-10-03 PROCEDURE — 99214 OFFICE O/P EST MOD 30 MIN: CPT

## 2024-10-03 PROCEDURE — G2211 COMPLEX E/M VISIT ADD ON: CPT

## 2024-10-04 ENCOUNTER — APPOINTMENT (OUTPATIENT)
Dept: HUMAN REPRODUCTION | Facility: CLINIC | Age: 39
End: 2024-10-04

## 2024-10-05 NOTE — PHYSICAL EXAM
[No Acute Distress] : no acute distress [Well Nourished] : well nourished [Well Developed] : well developed [Well-Appearing] : well-appearing [Normal Voice/Communication] : normal voice/communication [Normal Sclera/Conjunctiva] : normal sclera/conjunctiva [PERRL] : pupils equal round and reactive to light [EOMI] : extraocular movements intact [No JVD] : no jugular venous distention [Supple] : supple [No Respiratory Distress] : no respiratory distress  [Clear to Auscultation] : lungs were clear to auscultation bilaterally [Normal Rate] : normal rate  [Regular Rhythm] : with a regular rhythm [Normal S1, S2] : normal S1 and S2 [No Murmur] : no murmur heard [No Carotid Bruits] : no carotid bruits [Pedal Pulses Present] : the pedal pulses are present [No Edema] : there was no peripheral edema [Soft] : abdomen soft [Non Tender] : non-tender [No HSM] : no HSM [Normal Axillary Nodes] : no axillary lymphadenopathy [Normal Posterior Cervical Nodes] : no posterior cervical lymphadenopathy [Normal Anterior Cervical Nodes] : no anterior cervical lymphadenopathy [Normal Inguinal Nodes] : no inguinal lymphadenopathy [No CVA Tenderness] : no CVA  tenderness [No Joint Swelling] : no joint swelling [No Rash] : no rash [Coordination Grossly Intact] : coordination grossly intact [No Focal Deficits] : no focal deficits [Normal Gait] : normal gait

## 2024-10-05 NOTE — HISTORY OF PRESENT ILLNESS
[Parent] : parent [FreeTextEntry1] : Follow-up of MDD/ELIA, and prescription refills. [de-identified] : Patient presents for follow-up of major depressive disorder, generalized anxiety disorder, obsessive-compulsive disorder and chronic insomnia. Patient's brother recently diagnosed with ALS continues to decline. Family members are providing all home services for him. Brothers refused to except palliative care services. Patient reports having gone for genetic testing for [id number] gene-which was reported as weekly positive. Now reporting marked increase in anxiety symptoms since results were revealed and is scheduled to have test repeated for confirmation. Continues to have muscular fasciculations with normal EMG studies. Received influenza immunization at work.

## 2024-10-05 NOTE — REVIEW OF SYSTEMS
[Insomnia] : insomnia [Anxiety] : anxiety [Depression] : depression [Negative] : Heme/Lymph [Fatigue] : no fatigue [Recent Change In Weight] : ~T no recent weight change [Chest Pain] : no chest pain [Palpitations] : no palpitations [Lower Ext Edema] : no lower extremity edema [Shortness Of Breath] : no shortness of breath [Wheezing] : no wheezing [Cough] : no cough [Dyspnea on Exertion] : no dyspnea on exertion [Abdominal Pain] : no abdominal pain [Joint Pain] : no joint pain [Nail Changes] : no nail changes [Hair Changes] : no hair changes [Skin Rash] : no skin rash [Suicidal] : not suicidal [Swollen Glands] : no swollen glands

## 2024-10-05 NOTE — HISTORY OF PRESENT ILLNESS
[Parent] : parent [FreeTextEntry1] : Follow-up of MDD/ELIA, and prescription refills. [de-identified] : Patient presents for follow-up of major depressive disorder, generalized anxiety disorder, obsessive-compulsive disorder and chronic insomnia. Patient's brother recently diagnosed with ALS continues to decline. Family members are providing all home services for him. Brothers refused to except palliative care services. Patient reports having gone for genetic testing for [id number] gene-which was reported as weekly positive. Now reporting marked increase in anxiety symptoms since results were revealed and is scheduled to have test repeated for confirmation. Continues to have muscular fasciculations with normal EMG studies. Received influenza immunization at work.

## 2024-10-05 NOTE — HISTORY OF PRESENT ILLNESS
[Parent] : parent [FreeTextEntry1] : Follow-up of MDD/ELIA, and prescription refills. [de-identified] : Patient presents for follow-up of major depressive disorder, generalized anxiety disorder, obsessive-compulsive disorder and chronic insomnia. Patient's brother recently diagnosed with ALS continues to decline. Family members are providing all home services for him. Brothers refused to except palliative care services. Patient reports having gone for genetic testing for [id number] gene-which was reported as weekly positive. Now reporting marked increase in anxiety symptoms since results were revealed and is scheduled to have test repeated for confirmation. Continues to have muscular fasciculations with normal EMG studies. Received influenza immunization at work.

## 2024-10-22 ENCOUNTER — APPOINTMENT (OUTPATIENT)
Dept: HUMAN REPRODUCTION | Facility: CLINIC | Age: 39
End: 2024-10-22
Payer: COMMERCIAL

## 2024-10-22 PROCEDURE — 36415 COLL VENOUS BLD VENIPUNCTURE: CPT

## 2024-10-22 PROCEDURE — 99214 OFFICE O/P EST MOD 30 MIN: CPT

## 2024-10-29 ENCOUNTER — APPOINTMENT (OUTPATIENT)
Dept: HUMAN REPRODUCTION | Facility: CLINIC | Age: 39
End: 2024-10-29

## 2024-10-29 ENCOUNTER — APPOINTMENT (OUTPATIENT)
Dept: HUMAN REPRODUCTION | Facility: CLINIC | Age: 39
End: 2024-10-29
Payer: COMMERCIAL

## 2024-10-29 PROCEDURE — 99214 OFFICE O/P EST MOD 30 MIN: CPT

## 2024-10-31 ENCOUNTER — APPOINTMENT (OUTPATIENT)
Dept: HUMAN REPRODUCTION | Facility: CLINIC | Age: 39
End: 2024-10-31

## 2024-11-05 ENCOUNTER — APPOINTMENT (OUTPATIENT)
Dept: HUMAN REPRODUCTION | Facility: CLINIC | Age: 39
End: 2024-11-05
Payer: COMMERCIAL

## 2024-11-05 PROCEDURE — 99214 OFFICE O/P EST MOD 30 MIN: CPT

## 2024-11-07 ENCOUNTER — APPOINTMENT (OUTPATIENT)
Dept: HUMAN REPRODUCTION | Facility: CLINIC | Age: 39
End: 2024-11-07

## 2024-11-12 ENCOUNTER — APPOINTMENT (OUTPATIENT)
Dept: HUMAN REPRODUCTION | Facility: CLINIC | Age: 39
End: 2024-11-12

## 2024-11-12 PROCEDURE — 99214 OFFICE O/P EST MOD 30 MIN: CPT

## 2024-11-20 ENCOUNTER — APPOINTMENT (OUTPATIENT)
Dept: HUMAN REPRODUCTION | Facility: CLINIC | Age: 39
End: 2024-11-20

## 2024-11-20 PROCEDURE — 99214 OFFICE O/P EST MOD 30 MIN: CPT

## 2024-11-22 DIAGNOSIS — N23 UNSPECIFIED RENAL COLIC: ICD-10-CM

## 2024-11-23 RX ORDER — PHENAZOPYRIDINE 200 MG/1
200 TABLET, FILM COATED ORAL 3 TIMES DAILY
Qty: 30 | Refills: 0 | Status: ACTIVE | COMMUNITY
Start: 2024-11-23 | End: 2024-12-03

## 2024-11-23 RX ORDER — OXYCODONE AND ACETAMINOPHEN 5; 325 MG/1; MG/1
5-325 TABLET ORAL
Qty: 10 | Refills: 0 | Status: ACTIVE | COMMUNITY
Start: 2024-11-22 | End: 1900-01-01

## 2024-12-02 ENCOUNTER — APPOINTMENT (OUTPATIENT)
Dept: OBGYN | Facility: CLINIC | Age: 39
End: 2024-12-02

## 2024-12-02 ENCOUNTER — MED ADMIN CHARGE (OUTPATIENT)
Age: 39
End: 2024-12-02

## 2024-12-02 PROCEDURE — 90651 9VHPV VACCINE 2/3 DOSE IM: CPT

## 2024-12-02 PROCEDURE — 90471 IMMUNIZATION ADMIN: CPT

## 2024-12-10 ENCOUNTER — APPOINTMENT (OUTPATIENT)
Dept: HUMAN REPRODUCTION | Facility: CLINIC | Age: 39
End: 2024-12-10
Payer: COMMERCIAL

## 2024-12-10 PROCEDURE — 99214 OFFICE O/P EST MOD 30 MIN: CPT

## 2024-12-12 ENCOUNTER — APPOINTMENT (OUTPATIENT)
Dept: HUMAN REPRODUCTION | Facility: CLINIC | Age: 39
End: 2024-12-12

## 2024-12-16 ENCOUNTER — APPOINTMENT (OUTPATIENT)
Dept: HUMAN REPRODUCTION | Facility: CLINIC | Age: 39
End: 2024-12-16

## 2024-12-16 ENCOUNTER — NON-APPOINTMENT (OUTPATIENT)
Age: 39
End: 2024-12-16

## 2024-12-16 PROCEDURE — 36415 COLL VENOUS BLD VENIPUNCTURE: CPT

## 2024-12-26 ENCOUNTER — RESULT REVIEW (OUTPATIENT)
Age: 39
End: 2024-12-26

## 2025-01-09 ENCOUNTER — APPOINTMENT (OUTPATIENT)
Dept: HUMAN REPRODUCTION | Facility: CLINIC | Age: 40
End: 2025-01-09

## 2025-01-18 ENCOUNTER — NON-APPOINTMENT (OUTPATIENT)
Age: 40
End: 2025-01-18

## 2025-01-30 ENCOUNTER — APPOINTMENT (OUTPATIENT)
Dept: FAMILY MEDICINE | Facility: CLINIC | Age: 40
End: 2025-01-30
Payer: COMMERCIAL

## 2025-01-30 ENCOUNTER — NON-APPOINTMENT (OUTPATIENT)
Age: 40
End: 2025-01-30

## 2025-01-30 VITALS
BODY MASS INDEX: 22.02 KG/M2 | TEMPERATURE: 98.8 F | DIASTOLIC BLOOD PRESSURE: 70 MMHG | SYSTOLIC BLOOD PRESSURE: 100 MMHG | RESPIRATION RATE: 16 BRPM | OXYGEN SATURATION: 98 % | HEIGHT: 66 IN | HEART RATE: 90 BPM | WEIGHT: 137 LBS

## 2025-01-30 DIAGNOSIS — Z12.39 ENCOUNTER FOR OTHER SCREENING FOR MALIGNANT NEOPLASM OF BREAST: ICD-10-CM

## 2025-01-30 DIAGNOSIS — E78.5 HYPERLIPIDEMIA, UNSPECIFIED: ICD-10-CM

## 2025-01-30 DIAGNOSIS — Z00.00 ENCOUNTER FOR GENERAL ADULT MEDICAL EXAMINATION W/OUT ABNORMAL FINDINGS: ICD-10-CM

## 2025-01-30 DIAGNOSIS — F32.1 MAJOR DEPRESSIVE DISORDER, SINGLE EPISODE, MODERATE: ICD-10-CM

## 2025-01-30 DIAGNOSIS — Z12.31 ENCOUNTER FOR SCREENING MAMMOGRAM FOR MALIGNANT NEOPLASM OF BREAST: ICD-10-CM

## 2025-01-30 DIAGNOSIS — G43.009 MIGRAINE W/OUT AURA, NOT INTRACTABLE, W/OUT STATUS MIGRAINOSUS: ICD-10-CM

## 2025-01-30 DIAGNOSIS — Z12.12 ENCOUNTER FOR SCREENING FOR MALIGNANT NEOPLASM OF COLON: ICD-10-CM

## 2025-01-30 DIAGNOSIS — R73.03 PREDIABETES.: ICD-10-CM

## 2025-01-30 DIAGNOSIS — K51.90 ULCERATIVE COLITIS, UNSPECIFIED, W/OUT COMPLICATIONS: ICD-10-CM

## 2025-01-30 DIAGNOSIS — Z12.11 ENCOUNTER FOR SCREENING FOR MALIGNANT NEOPLASM OF COLON: ICD-10-CM

## 2025-01-30 PROCEDURE — G0444 DEPRESSION SCREEN ANNUAL: CPT | Mod: 59

## 2025-01-30 PROCEDURE — 99396 PREV VISIT EST AGE 40-64: CPT | Mod: 25

## 2025-01-30 PROCEDURE — 82270 OCCULT BLOOD FECES: CPT

## 2025-01-30 PROCEDURE — 93000 ELECTROCARDIOGRAM COMPLETE: CPT | Mod: 59

## 2025-01-30 PROCEDURE — 99214 OFFICE O/P EST MOD 30 MIN: CPT | Mod: 25

## 2025-01-30 PROCEDURE — 36415 COLL VENOUS BLD VENIPUNCTURE: CPT

## 2025-01-30 PROCEDURE — 99173 VISUAL ACUITY SCREEN: CPT | Mod: 59

## 2025-01-30 PROCEDURE — 92552 PURE TONE AUDIOMETRY AIR: CPT

## 2025-01-31 PROBLEM — Z12.39 ENCOUNTER FOR BREAST CANCER SCREENING USING NON-MAMMOGRAM MODALITY: Status: ACTIVE | Noted: 2025-01-30

## 2025-01-31 PROBLEM — Z12.31 ENCOUNTER FOR SCREENING MAMMOGRAM FOR MALIGNANT NEOPLASM OF BREAST: Status: ACTIVE | Noted: 2025-01-30 | Resolved: 2025-02-13

## 2025-01-31 LAB
DATE COLLECTED: NORMAL
HEMOCCULT SP1 STL QL: NEGATIVE
QUALITY CONTROL: YES

## 2025-03-26 ENCOUNTER — APPOINTMENT (OUTPATIENT)
Dept: COLORECTAL SURGERY | Facility: CLINIC | Age: 40
End: 2025-03-26
Payer: COMMERCIAL

## 2025-03-26 VITALS
HEART RATE: 80 BPM | HEIGHT: 66 IN | OXYGEN SATURATION: 100 % | BODY MASS INDEX: 22.02 KG/M2 | WEIGHT: 137 LBS | DIASTOLIC BLOOD PRESSURE: 81 MMHG | SYSTOLIC BLOOD PRESSURE: 144 MMHG | TEMPERATURE: 97.6 F

## 2025-03-26 PROCEDURE — 99204 OFFICE O/P NEW MOD 45 MIN: CPT

## 2025-03-26 RX ORDER — QUETIAPINE 25 MG/1
25 TABLET, FILM COATED ORAL
Refills: 0 | Status: ACTIVE | COMMUNITY

## 2025-03-26 RX ORDER — PANTOPRAZOLE SODIUM 40 MG/1
40 TABLET, DELAYED RELEASE ORAL
Refills: 0 | Status: ACTIVE | COMMUNITY

## 2025-03-26 RX ORDER — MESALAMINE 1.2 G/1
1.2 TABLET, DELAYED RELEASE ORAL
Refills: 0 | Status: ACTIVE | COMMUNITY

## 2025-03-26 RX ORDER — PREDNISONE 10 MG/1
10 TABLET ORAL
Refills: 0 | Status: ACTIVE | COMMUNITY

## 2025-03-28 ENCOUNTER — RESULT REVIEW (OUTPATIENT)
Age: 40
End: 2025-03-28

## 2025-05-14 DIAGNOSIS — R15.9 FULL INCONTINENCE OF FECES: ICD-10-CM

## 2025-05-24 ENCOUNTER — NON-APPOINTMENT (OUTPATIENT)
Age: 40
End: 2025-05-24

## 2025-07-08 ENCOUNTER — APPOINTMENT (OUTPATIENT)
Dept: HUMAN REPRODUCTION | Facility: CLINIC | Age: 40
End: 2025-07-08

## 2025-07-08 PROCEDURE — 99214 OFFICE O/P EST MOD 30 MIN: CPT | Mod: 95

## 2025-07-10 ENCOUNTER — APPOINTMENT (OUTPATIENT)
Dept: HUMAN REPRODUCTION | Facility: CLINIC | Age: 40
End: 2025-07-10

## 2025-07-23 ENCOUNTER — APPOINTMENT (OUTPATIENT)
Dept: HUMAN REPRODUCTION | Facility: CLINIC | Age: 40
End: 2025-07-23
Payer: COMMERCIAL

## 2025-07-23 PROCEDURE — 58999I: CUSTOM

## 2025-07-23 PROCEDURE — 76831 ECHO EXAM UTERUS: CPT

## 2025-07-23 PROCEDURE — 58340 CATHETER FOR HYSTEROGRAPHY: CPT

## 2025-08-26 ENCOUNTER — APPOINTMENT (OUTPATIENT)
Dept: HUMAN REPRODUCTION | Facility: CLINIC | Age: 40
End: 2025-08-26